# Patient Record
Sex: FEMALE | Race: WHITE | Employment: OTHER | URBAN - NONMETROPOLITAN AREA
[De-identification: names, ages, dates, MRNs, and addresses within clinical notes are randomized per-mention and may not be internally consistent; named-entity substitution may affect disease eponyms.]

---

## 2017-01-24 ENCOUNTER — ANTICOAGULATION THERAPY VISIT (OUTPATIENT)
Dept: ANTICOAGULATION | Facility: OTHER | Age: 57
End: 2017-01-24

## 2017-01-24 DIAGNOSIS — Z79.01 LONG-TERM (CURRENT) USE OF ANTICOAGULANTS: Primary | ICD-10-CM

## 2017-01-24 DIAGNOSIS — I10 BENIGN ESSENTIAL HYPERTENSION: ICD-10-CM

## 2017-01-24 DIAGNOSIS — E78.5 HYPERLIPIDEMIA WITH TARGET LDL LESS THAN 100: ICD-10-CM

## 2017-01-24 DIAGNOSIS — F25.1 SCHIZOAFFECTIVE DISORDER, DEPRESSIVE TYPE (H): Primary | ICD-10-CM

## 2017-01-24 DIAGNOSIS — Z79.899 ENCOUNTER FOR MONITORING STATIN THERAPY: ICD-10-CM

## 2017-01-24 DIAGNOSIS — Z79.899 NEED FOR PROPHYLACTIC CHEMOTHERAPY: ICD-10-CM

## 2017-01-24 DIAGNOSIS — Z51.81 ENCOUNTER FOR MONITORING STATIN THERAPY: ICD-10-CM

## 2017-01-24 DIAGNOSIS — Z79.01 LONG TERM (CURRENT) USE OF ANTICOAGULANTS: ICD-10-CM

## 2017-01-24 DIAGNOSIS — I26.99 PULMONARY EMBOLISM AND INFARCTION (H): ICD-10-CM

## 2017-01-24 DIAGNOSIS — I26.99 PULMONARY EMBOLISM WITH INFARCTION (H): ICD-10-CM

## 2017-01-24 LAB
ALBUMIN UR-MCNC: NEGATIVE MG/DL
APPEARANCE UR: CLEAR
BASOPHILS # BLD AUTO: 0 10E9/L (ref 0–0.2)
BASOPHILS NFR BLD AUTO: 0.2 %
BILIRUB UR QL STRIP: NEGATIVE
COLOR UR AUTO: YELLOW
DIFFERENTIAL METHOD BLD: ABNORMAL
EOSINOPHIL # BLD AUTO: 0.1 10E9/L (ref 0–0.7)
EOSINOPHIL NFR BLD AUTO: 1 %
ERYTHROCYTE [DISTWIDTH] IN BLOOD BY AUTOMATED COUNT: 15.8 % (ref 10–15)
GLUCOSE UR STRIP-MCNC: NEGATIVE MG/DL
HCT VFR BLD AUTO: 42.1 % (ref 35–47)
HGB BLD-MCNC: 13.2 G/DL (ref 11.7–15.7)
HGB UR QL STRIP: NEGATIVE
INR BLD: 1.9 (ref 0.86–1.14)
KETONES UR STRIP-MCNC: NEGATIVE MG/DL
LEUKOCYTE ESTERASE UR QL STRIP: NEGATIVE
LYMPHOCYTES # BLD AUTO: 1.3 10E9/L (ref 0.8–5.3)
LYMPHOCYTES NFR BLD AUTO: 13.5 %
MCH RBC QN AUTO: 28.1 PG (ref 26.5–33)
MCHC RBC AUTO-ENTMCNC: 31.4 G/DL (ref 31.5–36.5)
MCV RBC AUTO: 90 FL (ref 78–100)
MONOCYTES # BLD AUTO: 0.4 10E9/L (ref 0–1.3)
MONOCYTES NFR BLD AUTO: 4.1 %
NEUTROPHILS # BLD AUTO: 7.5 10E9/L (ref 1.6–8.3)
NEUTROPHILS NFR BLD AUTO: 81.2 %
NITRATE UR QL: NEGATIVE
PH UR STRIP: 8 PH (ref 5–7)
PLATELET # BLD AUTO: 169 10E9/L (ref 150–450)
RBC # BLD AUTO: 4.69 10E12/L (ref 3.8–5.2)
SP GR UR STRIP: 1.01 (ref 1–1.03)
URN SPEC COLLECT METH UR: ABNORMAL
UROBILINOGEN UR STRIP-ACNC: 0.2 EU/DL (ref 0.2–1)
WBC # BLD AUTO: 9.2 10E9/L (ref 4–11)

## 2017-01-24 PROCEDURE — 84443 ASSAY THYROID STIM HORMONE: CPT | Performed by: NURSE PRACTITIONER

## 2017-01-24 PROCEDURE — 36415 COLL VENOUS BLD VENIPUNCTURE: CPT | Performed by: NURSE PRACTITIONER

## 2017-01-24 PROCEDURE — 36416 COLLJ CAPILLARY BLOOD SPEC: CPT | Performed by: NURSE PRACTITIONER

## 2017-01-24 PROCEDURE — 40000788 ZZHCL STATISTIC ESTIMATED AVERAGE GLUCOSE: Performed by: NURSE PRACTITIONER

## 2017-01-24 PROCEDURE — 85025 COMPLETE CBC W/AUTO DIFF WBC: CPT | Performed by: NURSE PRACTITIONER

## 2017-01-24 PROCEDURE — 81003 URINALYSIS AUTO W/O SCOPE: CPT | Performed by: NURSE PRACTITIONER

## 2017-01-24 PROCEDURE — 80048 BASIC METABOLIC PNL TOTAL CA: CPT | Performed by: NURSE PRACTITIONER

## 2017-01-24 PROCEDURE — 85610 PROTHROMBIN TIME: CPT | Mod: QW | Performed by: NURSE PRACTITIONER

## 2017-01-24 PROCEDURE — 80061 LIPID PANEL: CPT | Performed by: NURSE PRACTITIONER

## 2017-01-24 PROCEDURE — 84439 ASSAY OF FREE THYROXINE: CPT | Performed by: NURSE PRACTITIONER

## 2017-01-24 PROCEDURE — 84480 ASSAY TRIIODOTHYRONINE (T3): CPT | Performed by: NURSE PRACTITIONER

## 2017-01-24 PROCEDURE — 83036 HEMOGLOBIN GLYCOSYLATED A1C: CPT | Performed by: NURSE PRACTITIONER

## 2017-01-24 PROCEDURE — 99000 SPECIMEN HANDLING OFFICE-LAB: CPT | Performed by: NURSE PRACTITIONER

## 2017-01-24 PROCEDURE — 80076 HEPATIC FUNCTION PANEL: CPT | Performed by: NURSE PRACTITIONER

## 2017-01-24 PROCEDURE — 80178 ASSAY OF LITHIUM: CPT | Performed by: NURSE PRACTITIONER

## 2017-01-24 NOTE — PROGRESS NOTES
ANTICOAGULATION FOLLOW-UP CLINIC VISIT    Patient Name:  Frances Corea  Date:  1/24/2017  Contact Type:  Telephone/ Frances Seo, pt's mother    SUBJECTIVE:     Patient Findings     Positives No Problem Findings           OBJECTIVE    INR POINT OF CARE   Date Value Ref Range Status   01/24/2017 1.9* 0.86 - 1.14 Final     Comment:     This test is intended for monitoring Coumadin therapy.  Results are not   accurate   in patients with prolonged INR due to factor deficiency.         ASSESSMENT / PLAN  No question data found.  Anticoagulation Summary as of 1/24/2017     INR goal 2.0-3.0   Selected INR 1.9! (1/24/2017)   Maintenance plan 9 mg (3 mg x 3) on Mon, Wed, Fri; 6 mg (3 mg x 2) all other days   Full instructions 1/24: 9 mg; Otherwise 9 mg on Mon, Wed, Fri; 6 mg all other days   Weekly total 51 mg   Plan last modified Emma Sood RN (7/19/2016)   Next INR check 2/21/2017   Priority INR   Target end date Indefinite    Indications   Long-term (current) use of anticoagulants [Z79.01] [Z79.01]  Pulmonary embolism with infarction (H) [I26.99] [I26.99]         Anticoagulation Episode Summary     INR check location     Preferred lab     Send INR reminders to  ANTICOAG POOL    Comments    Always contact Frances Osborne, pt's mother, who assists with AC therapy      Anticoagulation Care Providers     Provider Role Specialty Phone number    Ayah Rojas NP [facility] [phone]            See the Encounter Report to view Anticoagulation Flowsheet and Dosing Calendar (Go to Encounters tab in chart review, and find the Anticoagulation Therapy Visit)        Stefany Marquez RN

## 2017-01-25 LAB
ALBUMIN SERPL-MCNC: 3.7 G/DL (ref 3.4–5)
ALP SERPL-CCNC: 120 U/L (ref 40–150)
ALT SERPL W P-5'-P-CCNC: 31 U/L (ref 0–50)
ANION GAP SERPL CALCULATED.3IONS-SCNC: 9 MMOL/L (ref 3–14)
AST SERPL W P-5'-P-CCNC: 16 U/L (ref 0–45)
BILIRUB DIRECT SERPL-MCNC: 0.1 MG/DL (ref 0–0.2)
BILIRUB SERPL-MCNC: 0.7 MG/DL (ref 0.2–1.3)
BUN SERPL-MCNC: 13 MG/DL (ref 7–30)
CALCIUM SERPL-MCNC: 10.1 MG/DL (ref 8.5–10.1)
CHLORIDE SERPL-SCNC: 105 MMOL/L (ref 94–109)
CHOLEST SERPL-MCNC: 170 MG/DL
CO2 SERPL-SCNC: 27 MMOL/L (ref 20–32)
CREAT SERPL-MCNC: 0.61 MG/DL (ref 0.52–1.04)
EST. AVERAGE GLUCOSE BLD GHB EST-MCNC: 91 MG/DL
GFR SERPL CREATININE-BSD FRML MDRD: ABNORMAL ML/MIN/1.7M2
GLUCOSE SERPL-MCNC: 103 MG/DL (ref 70–99)
HBA1C MFR BLD: 4.8 % (ref 4.3–6)
HDLC SERPL-MCNC: 61 MG/DL
LDLC SERPL CALC-MCNC: 86 MG/DL
LITHIUM SERPL-SCNC: 1 MMOL/L (ref 0.6–1.2)
NONHDLC SERPL-MCNC: 109 MG/DL
POTASSIUM SERPL-SCNC: 4.1 MMOL/L (ref 3.4–5.3)
PROT SERPL-MCNC: 7.5 G/DL (ref 6.8–8.8)
SODIUM SERPL-SCNC: 141 MMOL/L (ref 133–144)
T3 SERPL-MCNC: 96 NG/DL (ref 60–181)
T4 FREE SERPL-MCNC: 0.79 NG/DL (ref 0.76–1.46)
TRIGL SERPL-MCNC: 115 MG/DL
TSH SERPL DL<=0.05 MIU/L-ACNC: 6.63 MU/L (ref 0.4–4)

## 2017-02-08 ENCOUNTER — TRANSFERRED RECORDS (OUTPATIENT)
Dept: HEALTH INFORMATION MANAGEMENT | Facility: HOSPITAL | Age: 57
End: 2017-02-08

## 2017-02-21 ENCOUNTER — ANTICOAGULATION THERAPY VISIT (OUTPATIENT)
Dept: ANTICOAGULATION | Facility: OTHER | Age: 57
End: 2017-02-21

## 2017-02-21 DIAGNOSIS — I26.99 PULMONARY EMBOLISM AND INFARCTION (H): ICD-10-CM

## 2017-02-21 DIAGNOSIS — Z79.01 LONG-TERM (CURRENT) USE OF ANTICOAGULANTS: ICD-10-CM

## 2017-02-21 DIAGNOSIS — Z79.01 LONG TERM (CURRENT) USE OF ANTICOAGULANTS: ICD-10-CM

## 2017-02-21 DIAGNOSIS — I26.99 PULMONARY EMBOLISM WITH INFARCTION (H): ICD-10-CM

## 2017-02-21 LAB — INR BLD: 2 (ref 0.86–1.14)

## 2017-02-21 PROCEDURE — 85610 PROTHROMBIN TIME: CPT | Mod: QW | Performed by: NURSE PRACTITIONER

## 2017-02-21 PROCEDURE — 36416 COLLJ CAPILLARY BLOOD SPEC: CPT | Performed by: NURSE PRACTITIONER

## 2017-02-21 NOTE — MR AVS SNAPSHOT
Frances Corea   2/21/2017   Anticoagulation Therapy Visit    Description:  56 year old female   Provider:  Ayah Rojas NP   Department:  H[facility] Anti Coagulation           INR as of 2/21/2017     Today's INR 2.0      Anticoagulation Summary as of 2/21/2017     INR goal 2.0-3.0   Today's INR 2.0   Full instructions 9 mg on Mon, Wed, Fri; 6 mg all other days   Next INR check 3/21/2017    Indications   Long-term (current) use of anticoagulants [Z79.01] [Z79.01]  Pulmonary embolism with infarction (H) [I26.99] [I26.99]         February 2017 Details    Sun Mon Tue Wed Thu Fri Sat        1               2               3               4                 5               6               7               8               9               10               11                 12               13               14               15               16               17               18                 19               20               21      6 mg   See details      22      9 mg         23      6 mg         24      9 mg         25      6 mg           26      6 mg         27      9 mg         28      6 mg              Date Details   02/21 This INR check               How to take your warfarin dose     To take:  6 mg Take 2 of the 3 mg tablets.    To take:  9 mg Take 3 of the 3 mg tablets.           March 2017 Details    Sun Mon Tue Wed Thu Fri Sat        1      9 mg         2      6 mg         3      9 mg         4      6 mg           5      6 mg         6      9 mg         7      6 mg         8      9 mg         9      6 mg         10      9 mg         11      6 mg           12      6 mg         13      9 mg         14      6 mg         15      9 mg         16      6 mg         17      9 mg         18      6 mg           19      6 mg         20      9 mg         21            22               23               24               25                 26               27               28               29               30               31                  Date Details   No additional details    Date of next INR:  3/21/2017         How to take your warfarin dose     To take:  6 mg Take 2 of the 3 mg tablets.    To take:  9 mg Take 3 of the 3 mg tablets.

## 2017-02-21 NOTE — PROGRESS NOTES
ANTICOAGULATION FOLLOW-UP CLINIC VISIT    Patient Name:  Frances Corea  Date:  2/21/2017  Contact Type:  Telephone/ message left on mother's phone re: INR result, warfarin dosing and INR recheck date. They are to notify us if patient has any bleeding/bruising, changes in diet/meds/activity or questions    SUBJECTIVE:     Patient Findings     Positives No Problem Findings           OBJECTIVE    INR Point of Care   Date Value Ref Range Status   02/21/2017 2.0 (H) 0.86 - 1.14 Final     Comment:     This test is intended for monitoring Coumadin therapy.  Results are not   accurate   in patients with prolonged INR due to factor deficiency.         ASSESSMENT / PLAN  INR assessment THER    Recheck INR In: 4 WEEKS    INR Location Clinic      Anticoagulation Summary as of 2/21/2017     INR goal 2.0-3.0   Today's INR 2.0   Maintenance plan 9 mg (3 mg x 3) on Mon, Wed, Fri; 6 mg (3 mg x 2) all other days   Full instructions 9 mg on Mon, Wed, Fri; 6 mg all other days   Weekly total 51 mg   No change documented Stefany Huynh RN   Plan last modified Emma Sood RN (7/19/2016)   Next INR check 3/21/2017   Priority INR   Target end date Indefinite    Indications   Long-term (current) use of anticoagulants [Z79.01] [Z79.01]  Pulmonary embolism with infarction (H) [I26.99] [I26.99]         Anticoagulation Episode Summary     INR check location     Preferred lab     Send INR reminders to [facility] POOL    Comments    Always contact Frances India, pt's mother, who assists with AC therapy      Anticoagulation Care Providers     Provider Role Specialty Phone number    Ayah Rojas NP [facility] [phone]            See the Encounter Report to view Anticoagulation Flowsheet and Dosing Calendar (Go to Encounters tab in chart review, and find the Anticoagulation Therapy Visit)        Stefany Huynh RN

## 2017-03-09 DIAGNOSIS — Z12.31 ENCOUNTER FOR SCREENING MAMMOGRAM FOR BREAST CANCER: Primary | ICD-10-CM

## 2017-03-14 DIAGNOSIS — M25.562 LEFT KNEE PAIN, UNSPECIFIED CHRONICITY: ICD-10-CM

## 2017-03-14 RX ORDER — TRAMADOL HYDROCHLORIDE 50 MG/1
TABLET ORAL
Qty: 60 TABLET | Refills: 0 | Status: SHIPPED | OUTPATIENT
Start: 2017-03-14 | End: 2017-04-12

## 2017-03-15 DIAGNOSIS — Z12.31 VISIT FOR SCREENING MAMMOGRAM: Primary | ICD-10-CM

## 2017-03-15 PROCEDURE — 77063 BREAST TOMOSYNTHESIS BI: CPT | Mod: TC

## 2017-03-15 PROCEDURE — G0202 SCR MAMMO BI INCL CAD: HCPCS | Mod: TC

## 2017-03-21 ENCOUNTER — OFFICE VISIT (OUTPATIENT)
Dept: FAMILY MEDICINE | Facility: OTHER | Age: 57
End: 2017-03-21
Attending: NURSE PRACTITIONER
Payer: MEDICARE

## 2017-03-21 ENCOUNTER — ANTICOAGULATION THERAPY VISIT (OUTPATIENT)
Dept: ANTICOAGULATION | Facility: OTHER | Age: 57
End: 2017-03-21

## 2017-03-21 VITALS
SYSTOLIC BLOOD PRESSURE: 134 MMHG | HEIGHT: 70 IN | HEART RATE: 84 BPM | WEIGHT: 293 LBS | BODY MASS INDEX: 41.95 KG/M2 | TEMPERATURE: 99.8 F | OXYGEN SATURATION: 14 % | DIASTOLIC BLOOD PRESSURE: 72 MMHG

## 2017-03-21 DIAGNOSIS — Z79.01 LONG TERM (CURRENT) USE OF ANTICOAGULANTS: ICD-10-CM

## 2017-03-21 DIAGNOSIS — I10 BENIGN ESSENTIAL HYPERTENSION: Primary | ICD-10-CM

## 2017-03-21 DIAGNOSIS — I26.99 PULMONARY EMBOLISM WITH INFARCTION (H): ICD-10-CM

## 2017-03-21 DIAGNOSIS — I26.99 PULMONARY EMBOLISM AND INFARCTION (H): ICD-10-CM

## 2017-03-21 DIAGNOSIS — Z79.01 LONG-TERM (CURRENT) USE OF ANTICOAGULANTS: ICD-10-CM

## 2017-03-21 LAB — INR BLD: 2.3 (ref 0.86–1.14)

## 2017-03-21 PROCEDURE — 85610 PROTHROMBIN TIME: CPT | Mod: QW | Performed by: NURSE PRACTITIONER

## 2017-03-21 PROCEDURE — 99212 OFFICE O/P EST SF 10 MIN: CPT

## 2017-03-21 PROCEDURE — 99213 OFFICE O/P EST LOW 20 MIN: CPT | Performed by: NURSE PRACTITIONER

## 2017-03-21 PROCEDURE — 36416 COLLJ CAPILLARY BLOOD SPEC: CPT | Performed by: NURSE PRACTITIONER

## 2017-03-21 RX ORDER — LISINOPRIL 5 MG/1
5 TABLET ORAL DAILY
Qty: 30 TABLET | Refills: 3 | Status: SHIPPED | OUTPATIENT
Start: 2017-03-21 | End: 2017-04-21

## 2017-03-21 ASSESSMENT — PAIN SCALES - GENERAL: PAINLEVEL: NO PAIN (0)

## 2017-03-21 NOTE — MR AVS SNAPSHOT
"              After Visit Summary   3/21/2017    Frances Corea    MRN: [id number]           Patient Information     Date Of Birth          1960        Visit Information        Provider Department      3/21/2017 2:45 PM Ayah Rojas NP [facility]        Today's Diagnoses     Benign essential hypertension    -  1      Care Instructions        1. Benign essential hypertension  - lisinopril (PRINIVIL/ZESTRIL) 5 MG tablet; Take 1 tablet (5 mg) by mouth daily  Dispense: 30 tablet; Refill: 3    FU visit at the end of April    Ayah Rojas -[facility]  [phone]              Follow-ups after your visit        Who to contact     If you have questions or need follow up information about today's clinic visit or your schedule please contact [facility] directly at [phone].  Normal or non-critical lab and imaging results will be communicated to you by MyChart, letter or phone within 4 business days after the clinic has received the results. If you do not hear from us within 7 days, please contact the clinic through MyChart or phone. If you have a critical or abnormal lab result, we will notify you by phone as soon as possible.  Submit refill requests through [facility] or call your pharmacy and they will forward the refill request to us. Please allow 3 business days for your refill to be completed.          Additional Information About Your Visit        MyChart Information     [facility] lets you send messages to your doctor, view your test results, renew your prescriptions, schedule appointments and more. To sign up, go to [email or web address] . Click on \"Log in\" on the left side of the screen, which will take you to the Welcome page. Then click on \"Sign up Now\" on the right side of the page.     You will be asked to enter the access code listed below, as well as some personal information. Please follow the directions to create your username and password.     Your access code is: " [email or web address]  Expires: 3/30/2017  3:14 PM     Your access code will  in 90 days. If you need help or a new code, please call your [facility] or [phone].        Care EveryWhere ID     This is your Care EveryWhere ID. This could be used by other organizations to access your [facility] records  [id number]        Your Vitals Were     Pulse Temperature Height Pulse Oximetry BMI (Body Mass Index)       84 99.8  F (37.7  C) (Tympanic) 5' 10\" (1.778 m) 14% 42.66 kg/m2        Blood Pressure from Last 3 Encounters:   17 134/72   16 116/76   10/15/16 168/81    Weight from Last 3 Encounters:   17 297 lb 4.8 oz (134.9 kg)   16 (!) 301 lb (136.5 kg)   10/15/16 294 lb 1.5 oz (133.4 kg)              Today, you had the following     No orders found for display         Today's Medication Changes          These changes are accurate as of: 3/21/17  3:32 PM.  If you have any questions, ask your nurse or doctor.               Start taking these medicines.        Dose/Directions    lisinopril 5 MG tablet   Commonly known as:  PRINIVIL/ZESTRIL   Used for:  Benign essential hypertension   Started by:  Ayah Rojas NP        Dose:  5 mg   Take 1 tablet (5 mg) by mouth daily   Quantity:  30 tablet   Refills:  3            Where to get your medicines      These medications were sent to [facility] [id number] [place] [address]  AT [facility] [address]   [address] [facility] [address]     Phone:  [phone]     lisinopril 5 MG tablet                Primary Care Provider Office Phone # Fax #    Ayah Rojas [phone]       [facility] [address] [address]        Thank you!     Thank you for choosing [facility]  for your care. Our goal is always to provide you with excellent care. Hearing back from our patients is one way we can continue to improve our services. Please take a few minutes to complete the written " survey that you may receive in the mail after your visit with us. Thank you!             Your Updated Medication List - Protect others around you: Learn how to safely use, store and throw away your medicines at www.disposemymeds.org.          This list is accurate as of: 3/21/17  3:32 PM.  Always use your most recent med list.                   Brand Name Dispense Instructions for use    albuterol (2.5 MG/3ML) 0.083% neb solution     75 mL    NEBULIZE 1 VIAL EVERY 6 HOURS AS NEEDED FOR SHORTNESS OF BREATH       ASPIRIN PO      Take 81 mg by mouth daily       CALCIUM + D PO      Take by mouth daily       clobetasol 0.05 % ointment    TEMOVATE    60 g    APPLY TOPICALLY TWICE DAILY ON THE BACK OF THE NECK AND HAIRLINE       CLOZAPINE PO    CLOZARIL     Take 75 mg by mouth At Bedtime       glucosamine-chondroitin 500-400 MG Caps per capsule      Take 1 capsule by mouth daily       latanoprost 0.005 % ophthalmic solution    XALATAN     Place 1 drop into both eyes At Bedtime       lisinopril 5 MG tablet    PRINIVIL/ZESTRIL    30 tablet    Take 1 tablet (5 mg) by mouth daily       * lithium 300 MG tablet      Take 300 mg by mouth 2 times daily       * lithium 300 MG tablet      Take 600 mg by mouth At Bedtime       LORAZEPAM PO      Take 0.5 mg by mouth every 4 hours as needed for anxiety       Lutein 20 MG Caps      Take by mouth daily       MAGNESIUM OXIDE PO      Take 400 mg by mouth daily       MELATONIN PO      Take 3 mg by mouth At Bedtime Pt takes at 8 PM       METHIMAZOLE PO      Take 5 mg by mouth daily 2 pills daily       MULTIVITAMIN & MINERAL PO      Take by mouth daily       order for DME     1 Device    Wheeled walker       PREVACID PO      Take 15 mg by mouth every morning (before breakfast)       PROBIOTIC DAILY PO      Take by mouth daily       sennosides 8.6 MG tablet    SENOKOT    120 tablet    Take 2 tablets by mouth daily as needed for constipation       simvastatin 20 MG tablet    ZOCOR    90 tablet     TAKE 1 TABLET BY MOUTH EVERY NIGHT AT BEDTIME       traMADol 50 MG tablet    ULTRAM    60 tablet    TAKE 1 TO 2 TABLETS BY MOUTH EVERY 6 HOURS AS NEEDED FOR PAIN. MAXIMUM OF 6 TABLETS PER DAY       triamcinolone 0.1 % ointment    KENALOG    80 g    APPLY TWICE DAILY TO BACK OF NECK AND HAIRLINE       TYLENOL PO      Take 500 mg by mouth       warfarin 3 MG tablet    COUMADIN    221 tablet    TAKE 3 TABLETS BY MOUTH ON MONDAY, WEDNESDAY, AND FRIDAY, AND 2 TABLETS BY MOUTH ON ALL OTHER DAYS OR AS DIRECTED PER WARFARIN CLINIC       * Notice:  This list has 2 medication(s) that are the same as other medications prescribed for you. Read the directions carefully, and ask your doctor or other care provider to review them with you.

## 2017-03-21 NOTE — MR AVS SNAPSHOT
Frances Corea   3/21/2017   Anticoagulation Therapy Visit    Description:  56 year old female   Provider:  Ayah Rojas NP   Department:  H[facility] Anti Coagulation           INR as of 3/21/2017     Today's INR 2.3      Anticoagulation Summary as of 3/21/2017     INR goal 2.0-3.0   Today's INR 2.3   Full instructions 9 mg on Mon, Wed, Fri; 6 mg all other days   Next INR check 5/2/2017    Indications   Long-term (current) use of anticoagulants [Z79.01] [Z79.01]  Pulmonary embolism with infarction (H) [I26.99] [I26.99]         March 2017 Details    Sun Mon Tue Wed Thu Fri Sat        1               2               3               4                 5               6               7               8               9               10               11                 12               13               14               15               16               17               18                 19               20               21      6 mg   See details      22      9 mg         23      6 mg         24      9 mg         25      6 mg           26      6 mg         27      9 mg         28      6 mg         29      9 mg         30      6 mg         31      9 mg           Date Details   03/21 This INR check               How to take your warfarin dose     To take:  6 mg Take 2 of the 3 mg tablets.    To take:  9 mg Take 3 of the 3 mg tablets.           April 2017 Details    Sun Mon Tue Wed Thu Fri Sat           1      6 mg           2      6 mg         3      9 mg         4      6 mg         5      9 mg         6      6 mg         7      9 mg         8      6 mg           9      6 mg         10      9 mg         11      6 mg         12      9 mg         13      6 mg         14      9 mg         15      6 mg           16      6 mg         17      9 mg         18      6 mg         19      9 mg         20      6 mg         21      9 mg         22      6 mg           23      6 mg         24      9 mg         25      6 mg         26      9  mg         27      6 mg         28      9 mg         29      6 mg           30      6 mg                Date Details   No additional details            How to take your warfarin dose     To take:  6 mg Take 2 of the 3 mg tablets.    To take:  9 mg Take 3 of the 3 mg tablets.           May 2017 Details    Sun Mon Tue Wed Thu Fri Sat      1      9 mg         2            3               4               5               6                 7               8               9               10               11               12               13                 14               15               16               17               18               19               20                 21               22               23               24               25               26               27                 28               29               30               31                   Date Details   No additional details    Date of next INR:  5/2/2017         How to take your warfarin dose     To take:  6 mg Take 2 of the 3 mg tablets.    To take:  9 mg Take 3 of the 3 mg tablets.

## 2017-03-21 NOTE — NURSING NOTE
"Chief Complaint   Patient presents with     Hypertension     follow up      *_* Health Care Directive *_*     on file        Initial /72 (BP Location: Left arm, Patient Position: Chair, Cuff Size: Adult Large)  Pulse 84  Temp 99.8  F (37.7  C) (Tympanic)  Ht 5' 10\" (1.778 m)  Wt 297 lb 4.8 oz (134.9 kg)  SpO2 (!) 14%  BMI 42.66 kg/m2 Estimated body mass index is 42.66 kg/(m^2) as calculated from the following:    Height as of this encounter: 5' 10\" (1.778 m).    Weight as of this encounter: 297 lb 4.8 oz (134.9 kg).  Medication Reconciliation: sam WELLS      "

## 2017-03-21 NOTE — PROGRESS NOTES
SUBJECTIVE:  Frances Corea is a 56 year old female   Chief Complaint   Patient presents with     Hypertension     follow up      *_* Health Care Directive *_*     on file        Active diagnoses this visit:  HTN    Follow up BP.  Pressure has fluctuated, so she wanted a recheck.  Home machine has high readings, and her mom has given her lisinopril prescribed for her Mom.  There are no high readings here.    Past Medical History   Diagnosis Date     Anxiety 3/17/2015     Benign essential hypertension 12/30/2016     Bipolar depression (H)      Chronic anticoagulation 2010     Chronic constipation 8/2/2016     Chronic deep vein thrombosis (DVT) of left lower extremity (H) 8/2/2016     COB (chronic obstructive bronchitis) (H)      Dyslipidemia      Esophageal reflux 3/17/2015     History of deep venous thrombosis 12/30/2016     History of psychiatric hospitalization      Multiple     Hyperlipidemia LDL goal < 100 3/17/2015     Insomnia 3/17/2015     Morbid obesity with BMI of 45.0-49.9, adult (H)      Obesity 3/17/2015     RADHA (obstructive sleep apnea)      Bipap 12-18/8-12, 4L O2     PE (pulmonary embolism) 2010     has been on chronic anticoagulation therapy.      Psoriasis 3/17/2015     RAD (reactive airway disease)      mild, intermittent     Schizoaffective disorder (H)      Sleep apnea      Vitamin D deficiency        Past Surgical History   Procedure Laterality Date     Hysterectomy       Hc ecp with cataract surgery       lt eye     Hc or ocular device intraop detached retina       Repair laceration hand  9/28/2013     Procedure: REPAIR LACERATION HAND;  repair left fifth finger laceration;  Surgeon: Miranda Xavier DO;  Location: [facility] OR     Gyn surgery       total hyst       Family History   Problem Relation Age of Onset     Ovarian Cancer Mother      CEREBROVASCULAR DISEASE Father        Social History   Substance Use Topics     Smoking status: Never Smoker     Smokeless tobacco: Never Used     Alcohol use  "No       Current Outpatient Prescriptions   Medication     traMADol (ULTRAM) 50 MG tablet     simvastatin (ZOCOR) 20 MG tablet     warfarin (COUMADIN) 3 MG tablet     clobetasol (TEMOVATE) 0.05 % ointment     triamcinolone (KENALOG) 0.1 % ointment     albuterol (2.5 MG/3ML) 0.083% nebulizer solution     METHIMAZOLE PO     ASPIRIN PO     LORAZEPAM PO     CLOZAPINE PO (CLOZARIL)     lithium 300 MG tablet     lithium 300 MG tablet     sennosides (SENOKOT) 8.6 MG tablet     order for DME     MAGNESIUM OXIDE PO     Multiple Vitamins-Minerals (MULTIVITAMIN & MINERAL PO)     Probiotic Product (PROBIOTIC DAILY PO)     Calcium Carbonate-Vitamin D (CALCIUM + D PO)     Lutein 20 MG CAPS     Acetaminophen (TYLENOL PO)     MELATONIN PO     latanoprost (XALATAN) 0.005 % ophthalmic solution     Lansoprazole (PREVACID PO)     glucosamine-chondroitin 500-400 MG CAPS     No current facility-administered medications for this visit.           Allergies   Allergen Reactions     Depakote      Increased lfts and ammonia     Zyprexa Other (See Comments)     Increased lft and increased ammonia.     Adhesive Tape Rash     Levaquin        REVIEW OF SYSTEMS  Skin: negative  Eyes: negative  Ears/Nose/Throat: negative  Respiratory: No shortness of breath, dyspnea on exertion, cough, or hemoptysis  Cardiovascular: negative  Gastrointestinal: negative  Genitourinary: negative  Musculoskeletal: negative  Neurologic: negative  Psychiatric: negative  Hematologic/Lymphatic/Immunologic: negative      OBJECTIVE:  /72 (BP Location: Left arm, Patient Position: Chair, Cuff Size: Adult Large)  Pulse 84  Temp 99.8  F (37.7  C) (Tympanic)  Ht 5' 10\" (1.778 m)  Wt 297 lb 4.8 oz (134.9 kg)  SpO2 (!) 14%  BMI 42.66 kg/m2  Constitutional: healthy, alert, no distress and cooperative  Head: Normocephalic. No masses, lesions, or tenderness  Neck: Neck supple. No adenopathy. Thyroid symmetric.  ENT: ENT exam unremarkable  Cardiovascular: PMI normal. No murmurs, " clicks gallops or rub  Respiratory: negative, Percussion normal. Good diaphragmatic excursion. Lungs clear  Gastrointestinal: Abdomen soft, non-tender. BS normal. No masses, organomegaly  Musculoskeletal: extremities normal- no gross deformities noted  Skin: no suspicious lesions or rashes  Neurologic: Gait normal. Sensation grossly WNL.  Psychiatric: mentation appears normal and affect normal/bright  Hematologic/Lymphatic/Immunologic: No adenopathy noted      Recent Mammogram was normal    Since pressures are consistently high t home, and given FH of HTN and CVA, I will start lisinopril 5mg daily.          1. Benign essential hypertension  - lisinopril (PRINIVIL/ZESTRIL) 5 MG tablet; Take 1 tablet (5 mg) by mouth daily  Dispense: 30 tablet; Refill: 3    FU visit at the end of April    Ayah JOY  [phone]

## 2017-03-21 NOTE — PROGRESS NOTES
ANTICOAGULATION FOLLOW-UP CLINIC VISIT    Patient Name:  Frances Corea  Date:  3/21/2017  Contact Type . Telephone/ message left on listed phone number to call,  which is her mother's phone, re: INR result, warfarin dosing and INR recheck date. She is to call if warfarin clinic if patient has had any bleeding/bruising, changes in diet/meds/activity or questions.     SUBJECTIVE:     Patient Findings     Positives No Problem Findings           OBJECTIVE    INR Point of Care   Date Value Ref Range Status   03/21/2017 2.3 (H) 0.86 - 1.14 Final     Comment:     This test is intended for monitoring Coumadin therapy.  Results are not   accurate   in patients with prolonged INR due to factor deficiency.         ASSESSMENT / PLAN  INR assessment THER    Recheck INR In: 6 WEEKS    INR Location Clinic      Anticoagulation Summary as of 3/21/2017     INR goal 2.0-3.0   Today's INR 2.3   Maintenance plan 9 mg (3 mg x 3) on Mon, Wed, Fri; 6 mg (3 mg x 2) all other days   Full instructions 9 mg on Mon, Wed, Fri; 6 mg all other days   Weekly total 51 mg   No change documented Stefany Huynh RN   Plan last modified Emma Sood RN (7/19/2016)   Next INR check 5/2/2017   Priority INR   Target end date Indefinite    Indications   Long-term (current) use of anticoagulants [Z79.01] [Z79.01]  Pulmonary embolism with infarction (H) [I26.99] [I26.99]         Anticoagulation Episode Summary     INR check location     Preferred lab     Send INR reminders to  ANTICOAG POOL    Comments    Always contact Frances India, pt's mother, who assists with AC therapy      Anticoagulation Care Providers     Provider Role Specialty Phone number    Ayah Rojas NP [facility] [phone]            See the Encounter Report to view Anticoagulation Flowsheet and Dosing Calendar (Go to Encounters tab in chart review, and find the Anticoagulation Therapy Visit)        Stefany Huynh, RN

## 2017-03-21 NOTE — PATIENT INSTRUCTIONS
1. Benign essential hypertension  - lisinopril (PRINIVIL/ZESTRIL) 5 MG tablet; Take 1 tablet (5 mg) by mouth daily  Dispense: 30 tablet; Refill: 3    FU visit at the end of April    Ayah ROCKKURT  [phone]

## 2017-04-12 DIAGNOSIS — M25.562 LEFT KNEE PAIN, UNSPECIFIED CHRONICITY: ICD-10-CM

## 2017-04-12 RX ORDER — TRAMADOL HYDROCHLORIDE 50 MG/1
TABLET ORAL
Qty: 60 TABLET | Refills: 0 | Status: SHIPPED | OUTPATIENT
Start: 2017-04-12 | End: 2017-05-11

## 2017-04-21 ENCOUNTER — OFFICE VISIT (OUTPATIENT)
Dept: FAMILY MEDICINE | Facility: OTHER | Age: 57
End: 2017-04-21
Attending: NURSE PRACTITIONER
Payer: COMMERCIAL

## 2017-04-21 VITALS
RESPIRATION RATE: 14 BRPM | SYSTOLIC BLOOD PRESSURE: 122 MMHG | HEIGHT: 70 IN | BODY MASS INDEX: 41.95 KG/M2 | WEIGHT: 293 LBS | TEMPERATURE: 99.3 F | DIASTOLIC BLOOD PRESSURE: 78 MMHG | HEART RATE: 96 BPM | OXYGEN SATURATION: 94 %

## 2017-04-21 DIAGNOSIS — I10 BENIGN ESSENTIAL HYPERTENSION: ICD-10-CM

## 2017-04-21 DIAGNOSIS — Z11.59 NEED FOR HEPATITIS C SCREENING TEST: Primary | ICD-10-CM

## 2017-04-21 DIAGNOSIS — R06.02 SOB (SHORTNESS OF BREATH): ICD-10-CM

## 2017-04-21 DIAGNOSIS — Z87.09 HISTORY OF RESPIRATORY FAILURE: ICD-10-CM

## 2017-04-21 DIAGNOSIS — G47.33 OSA (OBSTRUCTIVE SLEEP APNEA): ICD-10-CM

## 2017-04-21 PROCEDURE — 99214 OFFICE O/P EST MOD 30 MIN: CPT | Performed by: NURSE PRACTITIONER

## 2017-04-21 PROCEDURE — 86803 HEPATITIS C AB TEST: CPT | Performed by: NURSE PRACTITIONER

## 2017-04-21 PROCEDURE — 36415 COLL VENOUS BLD VENIPUNCTURE: CPT | Performed by: NURSE PRACTITIONER

## 2017-04-21 PROCEDURE — 99000 SPECIMEN HANDLING OFFICE-LAB: CPT | Performed by: NURSE PRACTITIONER

## 2017-04-21 PROCEDURE — 99212 OFFICE O/P EST SF 10 MIN: CPT

## 2017-04-21 RX ORDER — LISINOPRIL 10 MG/1
10 TABLET ORAL DAILY
Qty: 30 TABLET | Refills: 3 | Status: SHIPPED | OUTPATIENT
Start: 2017-04-21 | End: 2017-04-25

## 2017-04-21 RX ORDER — ALBUTEROL SULFATE 0.83 MG/ML
SOLUTION RESPIRATORY (INHALATION)
Qty: 1 BOX | Refills: 11 | Status: SHIPPED | OUTPATIENT
Start: 2017-04-21 | End: 2017-04-25

## 2017-04-21 NOTE — NURSING NOTE
"Chief Complaint   Patient presents with     Hypertension     Mother reports giving various doses of BP med depending on her daily BP.     Hepatitis C     Screen due       Initial /78 (BP Location: Left arm, Patient Position: Chair, Cuff Size: Adult Large)  Pulse 96  Temp 99.3  F (37.4  C) (Tympanic)  Resp 14  Ht 5' 10\" (1.778 m)  Wt 296 lb (134.3 kg)  SpO2 94%  BMI 42.47 kg/m2 Estimated body mass index is 42.47 kg/(m^2) as calculated from the following:    Height as of this encounter: 5' 10\" (1.778 m).    Weight as of this encounter: 296 lb (134.3 kg).  Medication Reconciliation: complete   Venus Lei      "

## 2017-04-21 NOTE — PROGRESS NOTES
SUBJECTIVE:  Frances Corea is a 56 year old female   Chief Complaint   Patient presents with     Hypertension     Mother reports giving various doses of BP med depending on her daily BP.     Hepatitis C     Screen due     Sleep Apnea     needs new bipap supplies     Shortness of Breath     needs replacement neb machine and albuterol nebs       Active diagnoses this visit:      Benign essential hypertension  Need for hepatitis C screening test  History of respiratory failure  SOB (shortness of breath)  RADHA (obstructive sleep apnea)       HTN  Present for years  Recent highs, Mom has been giving her 10mg of lisinopril with better control, and brings in numbers for review  No chest pain, no SOB, no edema  Low salt diet as able  Is more active      RADHA - history of PE - Mom states she is on Bipap  Bipap works well, no RADHA symptoms on Bipap    History of respiratory distress, and SOB - anxiety may contribute to this.  Uses nebs PRN, needs machine and tubing.    Due for Hep C testing    Past Medical History:   Diagnosis Date     Anxiety 3/17/2015     Benign essential hypertension 12/30/2016     Bipolar depression (H)      Chronic anticoagulation 2010     Chronic constipation 8/2/2016     Chronic deep vein thrombosis (DVT) of left lower extremity (H) 8/2/2016     COB (chronic obstructive bronchitis) (H)      Dyslipidemia      Esophageal reflux 3/17/2015     History of deep venous thrombosis 12/30/2016     History of psychiatric hospitalization     Multiple     Hyperlipidemia LDL goal < 100 3/17/2015     Insomnia 3/17/2015     Morbid obesity with BMI of 45.0-49.9, adult (H)      Obesity 3/17/2015     RADHA (obstructive sleep apnea)     Bipap 12-18/8-12, 4L O2     PE (pulmonary embolism) 2010    has been on chronic anticoagulation therapy.      Psoriasis 3/17/2015     RAD (reactive airway disease)     mild, intermittent     Schizoaffective disorder (H)      Sleep apnea      Vitamin D deficiency        Past Surgical History:    Procedure Laterality Date     GYN SURGERY      total hyst     HC ECP WITH CATARACT SURGERY      lt eye     HC OR OCULAR DEVICE INTRAOP DETACHED RETINA       HYSTERECTOMY       REPAIR LACERATION HAND  9/28/2013    Procedure: REPAIR LACERATION HAND;  repair left fifth finger laceration;  Surgeon: Miranda Xavier DO;  Location: [facility]       Family History   Problem Relation Age of Onset     Ovarian Cancer Mother      CEREBROVASCULAR DISEASE Father        Social History   Substance Use Topics     Smoking status: Never Smoker     Smokeless tobacco: Never Used     Alcohol use No       Current Outpatient Prescriptions   Medication     lisinopril (PRINIVIL/ZESTRIL) 10 MG tablet     order for DME     albuterol (2.5 MG/3ML) 0.083% neb solution     order for DME     traMADol (ULTRAM) 50 MG tablet     simvastatin (ZOCOR) 20 MG tablet     warfarin (COUMADIN) 3 MG tablet     clobetasol (TEMOVATE) 0.05 % ointment     triamcinolone (KENALOG) 0.1 % ointment     METHIMAZOLE PO     ASPIRIN PO     LORAZEPAM PO     CLOZAPINE PO (CLOZARIL)     lithium 300 MG tablet     lithium 300 MG tablet     sennosides (SENOKOT) 8.6 MG tablet     order for DME     MAGNESIUM OXIDE PO     Multiple Vitamins-Minerals (MULTIVITAMIN & MINERAL PO)     Probiotic Product (PROBIOTIC DAILY PO)     Calcium Carbonate-Vitamin D (CALCIUM + D PO)     Lutein 20 MG CAPS     Acetaminophen (TYLENOL PO)     MELATONIN PO     latanoprost (XALATAN) 0.005 % ophthalmic solution     Lansoprazole (PREVACID PO)     glucosamine-chondroitin 500-400 MG CAPS     [DISCONTINUED] lisinopril (PRINIVIL/ZESTRIL) 5 MG tablet     [DISCONTINUED] albuterol (2.5 MG/3ML) 0.083% nebulizer solution     No current facility-administered medications for this visit.           Allergies   Allergen Reactions     Depakote      Increased lfts and ammonia     Zyprexa Other (See Comments)     Increased lft and increased ammonia.     Adhesive Tape Rash     Levaquin        REVIEW OF SYSTEMS  Skin:  "negative  Eyes: negative  Ears/Nose/Throat: negative  Respiratory: Shortness of breath- at times, uses nebs.  RADHA, on Bipap  Cardiovascular: negative  Gastrointestinal: negative  Genitourinary: negative  Musculoskeletal: arthritis  Neurologic: negative  Psychiatric: extensive, managed by psychiatry  Hematologic/Lymphatic/Immunologic: negative      OBJECTIVE:  /78 (BP Location: Left arm, Patient Position: Chair, Cuff Size: Adult Large)  Pulse 96  Temp 99.3  F (37.4  C) (Tympanic)  Resp 14  Ht 5' 10\" (1.778 m)  Wt 296 lb (134.3 kg)  SpO2 94%  BMI 42.47 kg/m2     Constitutional: healthy, alert, no distress and cooperative  Head: Normocephalic. No masses, lesions, or tenderness  Neck: Neck supple. No adenopathy. Thyroid symmetric.  ENT: ENT exam unremarkable  Cardiovascular: PMI normal. No murmurs, clicks gallops or rub  Respiratory: negative, Percussion normal. Good diaphragmatic excursion. Lungs clear  Gastrointestinal: Abdomen soft, non-tender. BS normal. No masses, organomegaly  Musculoskeletal: extremities normal- no gross deformities noted  Skin: no suspicious lesions or rashes  Neurologic: Gait normal. Sensation grossly WNL.  Psychiatric: mentation appears normal and affect normal/bright  Hematologic/Lymphatic/Immunologic: No adenopathy noted        Visit time:   25 Minutes  Time spent with patient, including examination, face to face patient education - 15 mn  Visit Content: During our face to face time, patient education was provided regarding diagnosis, and treatment pan. Patient counseled regarding disease process  All diagnosis and treatment plan are reviewed with the patient, 50 % of face to face time is directed at patient education  Record review completed        1. Benign essential hypertension  - lisinopril (PRINIVIL/ZESTRIL) 10 MG tablet; Take 1 tablet (10 mg) by mouth daily  Dispense: 30 tablet; Refill: 3    2. Need for hepatitis C screening test  - Hepatitis C antibody    3. History of " respiratory failure  - order for DME; Equipment being ordered: neb machine  Dispense: 1 Device; Refill: 0  - albuterol (2.5 MG/3ML) 0.083% neb solution; NEBULIZE 1 VIAL EVERY 6 HOURS AS NEEDED FOR SHORTNESS OF BREATH  Dispense: 1 Box; Refill: 11    4. SOB (shortness of breath)  - albuterol (2.5 MG/3ML) 0.083% neb solution; NEBULIZE 1 VIAL EVERY 6 HOURS AS NEEDED FOR SHORTNESS OF BREATH  Dispense: 1 Box; Refill: 11    5. RADHA (obstructive sleep apnea)  - order for DME; Equipment being ordered: Bipap and supplies - states it has been 5 years  Dispense: 1 Device; Refill: 0    Please schedule a morning fasting visit for July, to address chronic disease management      Ayah JOY  [phone]

## 2017-04-21 NOTE — PATIENT INSTRUCTIONS
1. Benign essential hypertension  - lisinopril (PRINIVIL/ZESTRIL) 10 MG tablet; Take 1 tablet (10 mg) by mouth daily  Dispense: 30 tablet; Refill: 3    2. Need for hepatitis C screening test  - Hepatitis C antibody    3. History of respiratory failure  - order for DME; Equipment being ordered: neb machine  Dispense: 1 Device; Refill: 0  - albuterol (2.5 MG/3ML) 0.083% neb solution; NEBULIZE 1 VIAL EVERY 6 HOURS AS NEEDED FOR SHORTNESS OF BREATH  Dispense: 1 Box; Refill: 11    4. SOB (shortness of breath)  - albuterol (2.5 MG/3ML) 0.083% neb solution; NEBULIZE 1 VIAL EVERY 6 HOURS AS NEEDED FOR SHORTNESS OF BREATH  Dispense: 1 Box; Refill: 11    5. RADHA (obstructive sleep apnea)  - order for DME; Equipment being ordered: Bipap and supplies - states it has been 5 years  Dispense: 1 Device; Refill: 0    Please schedule a morning fasting visit for July, to address chronic disease management      Ayah JOY  [phone]

## 2017-04-21 NOTE — MR AVS SNAPSHOT
After Visit Summary   4/21/2017    Frances Corea    MRN: [id number]           Patient Information     Date Of Birth          1960        Visit Information        Provider Department      4/21/2017 1:45 PM Ayah Rojas NP [facility]        Today's Diagnoses     Need for hepatitis C screening test    -  1    Benign essential hypertension        History of respiratory failure        SOB (shortness of breath)        RADHA (obstructive sleep apnea)          Care Instructions        1. Benign essential hypertension  - lisinopril (PRINIVIL/ZESTRIL) 10 MG tablet; Take 1 tablet (10 mg) by mouth daily  Dispense: 30 tablet; Refill: 3    2. Need for hepatitis C screening test  - Hepatitis C antibody    3. History of respiratory failure  - order for DME; Equipment being ordered: neb machine  Dispense: 1 Device; Refill: 0  - albuterol (2.5 MG/3ML) 0.083% neb solution; NEBULIZE 1 VIAL EVERY 6 HOURS AS NEEDED FOR SHORTNESS OF BREATH  Dispense: 1 Box; Refill: 11    4. SOB (shortness of breath)  - albuterol (2.5 MG/3ML) 0.083% neb solution; NEBULIZE 1 VIAL EVERY 6 HOURS AS NEEDED FOR SHORTNESS OF BREATH  Dispense: 1 Box; Refill: 11    5. RADHA (obstructive sleep apnea)  - order for DME; Equipment being ordered: Bipap and supplies - states it has been 5 years  Dispense: 1 Device; Refill: 0    Please schedule a morning fasting visit for July, to address chronic disease management      Ayah [facility]  [phone]              Follow-ups after your visit        Who to contact     If you have questions or need follow up information about today's clinic visit or your schedule please contact [facility] directly at [phone].  Normal or non-critical lab and imaging results will be communicated to you by MyChart, letter or phone within 4 business days after the clinic has received the results. If you do not hear from us within 7 days, please contact the clinic through MyChart or phone. If  "you have a critical or abnormal lab result, we will notify you by phone as soon as possible.  Submit refill requests through [employer] or call your pharmacy and they will forward the refill request to us. Please allow 3 business days for your refill to be completed.          Additional Information About Your Visit        "[employer]hart Information     Insights lets you send messages to your doctor, view your test results, renew your prescriptions, schedule appointments and more. To sign up, go to [email or web address] . Click on \"Log in\" on the left side of the screen, which will take you to the Welcome page. Then click on \"Sign up Now\" on the right side of the page.     You will be asked to enter the access code listed below, as well as some personal information. Please follow the directions to create your username and password.     Your access code is: [email or web address]  Expires: 2017  2:34 PM     Your access code will  in 90 days. If you need help or a new code, please call your [place] clinic or [phone].        Care EveryWhere ID     This is your Care EveryWhere ID. This could be used by other organizations to access your [place] medical records  [id number]        Your Vitals Were     Pulse Temperature Respirations Height Pulse Oximetry BMI (Body Mass Index)    96 99.3  F (37.4  C) (Tympanic) 14 5' 10\" (1.778 m) 94% 42.47 kg/m2       Blood Pressure from Last 3 Encounters:   17 122/78   17 134/72   16 116/76    Weight from Last 3 Encounters:   17 296 lb (134.3 kg)   17 297 lb 4.8 oz (134.9 kg)   16 (!) 301 lb (136.5 kg)              We Performed the Following     Hepatitis C antibody          Today's Medication Changes          These changes are accurate as of: 17  2:37 PM.  If you have any questions, ask your nurse or doctor.               These medicines have changed or have updated prescriptions.        Dose/Directions    lisinopril 10 MG tablet   Commonly known " as:  PRINIVIL/ZESTRIL   This may have changed:    - medication strength  - how much to take   Used for:  Benign essential hypertension   Changed by:  Ayah Rojas NP        Dose:  10 mg   Take 1 tablet (10 mg) by mouth daily   Quantity:  30 tablet   Refills:  3       * order for DME   This may have changed:  Another medication with the same name was added. Make sure you understand how and when to take each.   Used for:  Altered gait   Changed by:  Ayah Rojas NP        Wheeled walker   Quantity:  1 Device   Refills:  0       * order for DME   This may have changed:  You were already taking a medication with the same name, and this prescription was added. Make sure you understand how and when to take each.   Used for:  History of respiratory failure   Changed by:  Ayah Rojas NP        Equipment being ordered: neb machine   Quantity:  1 Device   Refills:  0       * order for DME   This may have changed:  You were already taking a medication with the same name, and this prescription was added. Make sure you understand how and when to take each.   Used for:  RADHA (obstructive sleep apnea)   Changed by:  Ayah Rojas NP        Equipment being ordered: Bipap and supplies - states it has been 5 years   Quantity:  1 Device   Refills:  0       * Notice:  This list has 3 medication(s) that are the same as other medications prescribed for you. Read the directions carefully, and ask your doctor or other care provider to review them with you.         Where to get your medicines      These medications were sent to [facility] [address] [facility] [address]  [address], [place] [address]     Phone:  [phone]     albuterol (2.5 MG/3ML) 0.083% neb solution    lisinopril 10 MG tablet         Some of these will need a paper prescription and others can be bought over the counter.  Ask your nurse if you have questions.     Bring a paper prescription for each of these  medications     order for DME    order for DME                Primary Care Provider Office Phone # Fax #    Ayah Rojas [phone]       [address] [address]        Thank you!     Thank you for choosing [facility]  for your care. Our goal is always to provide you with excellent care. Hearing back from our patients is one way we can continue to improve our services. Please take a few minutes to complete the written survey that you may receive in the mail after your visit with us. Thank you!             Your Updated Medication List - Protect others around you: Learn how to safely use, store and throw away your medicines at www.disposemymeds.org.          This list is accurate as of: 4/21/17  2:37 PM.  Always use your most recent med list.                   Brand Name Dispense Instructions for use    albuterol (2.5 MG/3ML) 0.083% neb solution     1 Box    NEBULIZE 1 VIAL EVERY 6 HOURS AS NEEDED FOR SHORTNESS OF BREATH       ASPIRIN PO      Take 81 mg by mouth daily       CALCIUM + D PO      Take by mouth daily       clobetasol 0.05 % ointment    TEMOVATE    60 g    APPLY TOPICALLY TWICE DAILY ON THE BACK OF THE NECK AND HAIRLINE       CLOZAPINE PO    CLOZARIL     Take 75 mg by mouth At Bedtime       glucosamine-chondroitin 500-400 MG Caps per capsule      Take 1 capsule by mouth daily       latanoprost 0.005 % ophthalmic solution    XALATAN     Place 1 drop into both eyes At Bedtime       lisinopril 10 MG tablet    PRINIVIL/ZESTRIL    30 tablet    Take 1 tablet (10 mg) by mouth daily       * lithium 300 MG tablet      Take 300 mg by mouth 2 times daily       * lithium 300 MG tablet      Take 600 mg by mouth At Bedtime       LORAZEPAM PO      Take 0.5 mg by mouth every 4 hours as needed for anxiety       Lutein 20 MG Caps      Take by mouth daily       MAGNESIUM OXIDE PO      Take 400 mg by mouth daily       MELATONIN PO      Take 3 mg by mouth At Bedtime  Pt takes at 8 PM       METHIMAZOLE PO      Take 5 mg by mouth daily 2 pills daily       MULTIVITAMIN & MINERAL PO      Take by mouth daily       * order for DME     1 Device    Wheeled walker       * order for DME     1 Device    Equipment being ordered: neb machine       * order for DME     1 Device    Equipment being ordered: Bipap and supplies - states it has been 5 years       PREVACID PO      Take 15 mg by mouth every morning (before breakfast)       PROBIOTIC DAILY PO      Take by mouth daily       sennosides 8.6 MG tablet    SENOKOT    120 tablet    Take 2 tablets by mouth daily as needed for constipation       simvastatin 20 MG tablet    ZOCOR    90 tablet    TAKE 1 TABLET BY MOUTH EVERY NIGHT AT BEDTIME       traMADol 50 MG tablet    ULTRAM    60 tablet    TAKE 1 TO 2 TABLETS BY MOUTH EVERY 6 HOURS AS NEEDED FOR PAIN. MAXIMUM 6 TABLETS PER DAY       triamcinolone 0.1 % ointment    KENALOG    80 g    APPLY TWICE DAILY TO BACK OF NECK AND HAIRLINE       TYLENOL PO      Take 500 mg by mouth       warfarin 3 MG tablet    COUMADIN    221 tablet    TAKE 3 TABLETS BY MOUTH ON MONDAY, WEDNESDAY, AND FRIDAY, AND 2 TABLETS BY MOUTH ON ALL OTHER DAYS OR AS DIRECTED PER WARFARIN CLINIC       * Notice:  This list has 5 medication(s) that are the same as other medications prescribed for you. Read the directions carefully, and ask your doctor or other care provider to review them with you.

## 2017-04-25 ENCOUNTER — TELEPHONE (OUTPATIENT)
Dept: FAMILY MEDICINE | Facility: OTHER | Age: 57
End: 2017-04-25

## 2017-04-25 DIAGNOSIS — R06.02 SOB (SHORTNESS OF BREATH): ICD-10-CM

## 2017-04-25 DIAGNOSIS — Z87.09 HISTORY OF RESPIRATORY FAILURE: ICD-10-CM

## 2017-04-25 DIAGNOSIS — I10 BENIGN ESSENTIAL HYPERTENSION: ICD-10-CM

## 2017-04-25 LAB — HCV AB SERPL QL IA: NORMAL

## 2017-04-25 RX ORDER — LISINOPRIL 10 MG/1
10 TABLET ORAL DAILY
Qty: 90 TABLET | Refills: 1 | Status: SHIPPED | OUTPATIENT
Start: 2017-04-25 | End: 2017-08-08

## 2017-04-25 RX ORDER — ALBUTEROL SULFATE 0.83 MG/ML
SOLUTION RESPIRATORY (INHALATION)
Qty: 1125 ML | Refills: 1 | Status: SHIPPED | OUTPATIENT
Start: 2017-04-25 | End: 2017-12-26

## 2017-04-25 NOTE — TELEPHONE ENCOUNTER
Bi-pap isn't due to be replaced until Oct of this yr.  Also neb machine isn't due to be replaced until Dec of 2018.  Pt will hold scripts until due.  Will return meb machine to [facility].

## 2017-04-25 NOTE — TELEPHONE ENCOUNTER
9:35 AM    Reason for Call: Phone Call    Description: Frances (mom) called to speak with nurse. Stated she needs to speak with Janee. When they were at an appt on Friday they had a transaction with Janee and she needs to get something straightened out with her. Did not give further detail. Please call her back at [phone]    Was an appointment offered for this call? No    Preferred method for responding to this message: Telephone Call    If we cannot reach you directly, may we leave a detailed response at the number you provided? Yes    Can this message wait until your PCP/provider returns, if available today? Not applicable    Giulia Rios

## 2017-05-02 ENCOUNTER — ANTICOAGULATION THERAPY VISIT (OUTPATIENT)
Dept: ANTICOAGULATION | Facility: OTHER | Age: 57
End: 2017-05-02

## 2017-05-02 DIAGNOSIS — I26.99 PULMONARY EMBOLISM WITH INFARCTION (H): ICD-10-CM

## 2017-05-02 DIAGNOSIS — I26.99 PULMONARY EMBOLISM AND INFARCTION (H): ICD-10-CM

## 2017-05-02 DIAGNOSIS — Z79.01 LONG-TERM (CURRENT) USE OF ANTICOAGULANTS: ICD-10-CM

## 2017-05-02 DIAGNOSIS — Z79.01 LONG TERM (CURRENT) USE OF ANTICOAGULANTS: ICD-10-CM

## 2017-05-02 LAB — INR BLD: 2 (ref 0.86–1.14)

## 2017-05-02 PROCEDURE — 85610 PROTHROMBIN TIME: CPT | Mod: QW,ZL | Performed by: NURSE PRACTITIONER

## 2017-05-02 PROCEDURE — 36416 COLLJ CAPILLARY BLOOD SPEC: CPT | Mod: ZL | Performed by: NURSE PRACTITIONER

## 2017-05-02 NOTE — MR AVS SNAPSHOT
Frances Corea   5/2/2017   Anticoagulation Therapy Visit    Description:  56 year old female   Provider:  Ayah Rojas NP   Department:  [facility] Anti Coagulation           INR as of 5/2/2017     Today's INR 2.0      Anticoagulation Summary as of 5/2/2017     INR goal 2.0-3.0   Today's INR 2.0   Full instructions 9 mg on Mon, Wed, Fri; 6 mg all other days   Next INR check 6/13/2017    Indications   Long-term (current) use of anticoagulants [Z79.01] [Z79.01]  Pulmonary embolism with infarction (H) [I26.99] [I26.99]         May 2017 Details    Sun Mon Tue Wed Thu Fri Sat      1               2      6 mg   See details      3      9 mg         4      6 mg         5      9 mg         6      6 mg           7      6 mg         8      9 mg         9      6 mg         10      9 mg         11      6 mg         12      9 mg         13      6 mg           14      6 mg         15      9 mg         16      6 mg         17      9 mg         18      6 mg         19      9 mg         20      6 mg           21      6 mg         22      9 mg         23      6 mg         24      9 mg         25      6 mg         26      9 mg         27      6 mg           28      6 mg         29      9 mg         30      6 mg         31      9 mg             Date Details   05/02 This INR check               How to take your warfarin dose     To take:  6 mg Take 2 of the 3 mg tablets.    To take:  9 mg Take 3 of the 3 mg tablets.           June 2017 Details    Sun Mon Tue Wed Thu Fri Sat         1      6 mg         2      9 mg         3      6 mg           4      6 mg         5      9 mg         6      6 mg         7      9 mg         8      6 mg         9      9 mg         10      6 mg           11      6 mg         12      9 mg         13            14               15               16               17                 18               19               20               21               22               23               24                 25                26               27               28               29               30                 Date Details   No additional details    Date of next INR:  6/13/2017         How to take your warfarin dose     To take:  6 mg Take 2 of the 3 mg tablets.    To take:  9 mg Take 3 of the 3 mg tablets.

## 2017-05-02 NOTE — PROGRESS NOTES
ANTICOAGULATION FOLLOW-UP CLINIC VISIT    Patient Name:  Frances Corea  Date:  5/2/2017  Contact Type:  Telephone/ message left on mother's cell phone per instructions re: INR result, warfarin dosing and INR recheck date. They are to call warfarin clinic if questions.     SUBJECTIVE:     Patient Findings     Comments Patient to call and notify warfarin clinic if any bleeding/bruising, changes in diet/meds/activity or questions           OBJECTIVE    INR Point of Care   Date Value Ref [facility] Status   05/02/2017 2.0 (H) 0.86 - 1.14 Final     Comment:     This test is intended for monitoring Coumadin therapy.  Results are not   accurate   in patients with prolonged INR due to factor deficiency.         ASSESSMENT / PLAN  INR assessment THER    Recheck INR In: 6 WEEKS    INR Location Clinic      Anticoagulation Summary as of 5/2/2017     INR goal 2.0-3.0   Today's INR 2.0   Maintenance plan 9 mg (3 mg x 3) on Mon, Wed, Fri; 6 mg (3 mg x 2) all other days   Full instructions 9 mg on Mon, Wed, Fri; 6 mg all other days   Weekly total 51 mg   No change documented Stefany Huynh RN   Plan last modified Emma Sood RN (7/19/2016)   Next INR check 6/13/2017   Priority INR   Target end date Indefinite    Indications   Long-term (current) use of anticoagulants [Z79.01] [Z79.01]  Pulmonary embolism with infarction (H) [I26.99] [I26.99]         Anticoagulation Episode Summary     INR check location     Preferred lab     Send INR reminders to [facility] POOL    Comments    Always contact Frances India, pt's mother, who assists with AC therapy      Anticoagulation Care Providers     Provider Role Specialty Phone number    Ayah Rojas NP [facility] [phone]            See the Encounter Report to view Anticoagulation Flowsheet and Dosing Calendar (Go to Encounters tab in chart review, and find the Anticoagulation Therapy Visit)        Stefany Huynh RN

## 2017-05-11 DIAGNOSIS — M25.562 LEFT KNEE PAIN, UNSPECIFIED CHRONICITY: ICD-10-CM

## 2017-05-11 RX ORDER — TRAMADOL HYDROCHLORIDE 50 MG/1
TABLET ORAL
Qty: 60 TABLET | Refills: 0 | Status: SHIPPED | OUTPATIENT
Start: 2017-05-11 | End: 2017-06-07

## 2017-05-11 NOTE — TELEPHONE ENCOUNTER
tramadol      Last Written Prescription Date: 4/12/17  Last Fill Quantity: 60,  # refills: 0   Last Office Visit with G, P or [facility] prescribing provider: 4/21/17

## 2017-06-07 DIAGNOSIS — L20.9 ATOPIC DERMATITIS, UNSPECIFIED TYPE: ICD-10-CM

## 2017-06-07 DIAGNOSIS — M25.562 LEFT KNEE PAIN, UNSPECIFIED CHRONICITY: ICD-10-CM

## 2017-06-09 ENCOUNTER — TELEPHONE (OUTPATIENT)
Dept: FAMILY MEDICINE | Facility: OTHER | Age: 57
End: 2017-06-09

## 2017-06-09 RX ORDER — TRAMADOL HYDROCHLORIDE 50 MG/1
TABLET ORAL
Qty: 60 TABLET | Refills: 0 | Status: SHIPPED | OUTPATIENT
Start: 2017-06-09 | End: 2017-06-30

## 2017-06-09 RX ORDER — TRIAMCINOLONE ACETONIDE 1 MG/G
OINTMENT TOPICAL
Qty: 80 G | Refills: 1 | Status: SHIPPED | OUTPATIENT
Start: 2017-06-09 | End: 2018-12-24

## 2017-06-09 NOTE — TELEPHONE ENCOUNTER
Reason for call:  Medication    1. Medication Name? Tramadol  2. Is this request for a refill? Yes  3. What Pharmacy do you use? [facility]  4. Have you contacted your pharmacy? Yes    5. If yes, when? 06/06  (Please note that the turn-around-time for prescriptions is 72 business hours; I am sending your request at this time. SEND TO  [facility] Pool  )  Description: Patient's mom calling in regards to the Rx and stated that [facility] told her she is needing to  the hard copy for this medication and that it can't be done through the computer. I advised patient that these may take up to 3 business days to complete and she stated she called three days ago and was hoping to  the Rx today.   Was an appointment offered for this a call? No   Preferred method for responding to this messageTelephone Call  If we cannot reach you directly, may we leave a detailed response at the number you provided? Yes  Can this message wait until your PCP/Provider returns if not available today? Not applicable

## 2017-06-09 NOTE — TELEPHONE ENCOUNTER
Medication was signed today by Ayah Rojas.  Please notify patient that it got faxed to pharmacy. Thank you

## 2017-06-20 ENCOUNTER — ANTICOAGULATION THERAPY VISIT (OUTPATIENT)
Dept: ANTICOAGULATION | Facility: OTHER | Age: 57
End: 2017-06-20

## 2017-06-20 DIAGNOSIS — I26.99 PULMONARY EMBOLISM WITH INFARCTION (H): ICD-10-CM

## 2017-06-20 DIAGNOSIS — Z79.01 LONG TERM (CURRENT) USE OF ANTICOAGULANTS: ICD-10-CM

## 2017-06-20 DIAGNOSIS — Z79.01 LONG-TERM (CURRENT) USE OF ANTICOAGULANTS: ICD-10-CM

## 2017-06-20 DIAGNOSIS — I26.99 PULMONARY EMBOLISM AND INFARCTION (H): ICD-10-CM

## 2017-06-20 LAB — INR BLD: 1.5 (ref 0.86–1.14)

## 2017-06-20 PROCEDURE — 85610 PROTHROMBIN TIME: CPT | Mod: QW,ZL | Performed by: NURSE PRACTITIONER

## 2017-06-20 PROCEDURE — 36416 COLLJ CAPILLARY BLOOD SPEC: CPT | Mod: ZL | Performed by: NURSE PRACTITIONER

## 2017-06-20 NOTE — PROGRESS NOTES
ANTICOAGULATION FOLLOW-UP CLINIC VISIT    Patient Name:  Frances Corea  Date:  6/20/2017  Contact Type:  Telephone/ message left on mother's listed phone number    SUBJECTIVE:     Patient Findings     Comments Unknown if any new changes or increases in vit K intake. Message left on Frances's listed phone voicemail re: INR result, warfarin dosing and INR recheck date. She is to notify warfarin clinic if any bleeding/bruising, changes in diet/meds/activity or questions. .            OBJECTIVE    INR Point of Care   Date Value Ref Range Status   06/20/2017 1.5 (H) 0.86 - 1.14 Final     Comment:     This test is intended for monitoring Coumadin therapy.  Results are not   accurate   in patients with prolonged INR due to factor deficiency.         ASSESSMENT / PLAN  INR assessment SUB    Recheck INR In: 1 WEEK    INR Location Clinic      Anticoagulation Summary as of 6/20/2017     INR goal 2.0-3.0   Today's INR 1.5!   Maintenance plan 9 mg (3 mg x 3) on Mon, Wed, Fri; 6 mg (3 mg x 2) all other days   Full instructions 6/20: 9 mg; 6/21: 10.5 mg; Otherwise 9 mg on Mon, Wed, Fri; 6 mg all other days   Weekly total 51 mg   Plan last modified Emma Sood RN (7/19/2016)   Next INR check 6/27/2017   Priority INR   Target end date Indefinite    Indications   Long-term (current) use of anticoagulants [Z79.01] [Z79.01]  Pulmonary embolism with infarction (H) [I26.99] [I26.99]         Anticoagulation Episode Summary     INR check location     Preferred lab     Send INR reminders to [facility] ANTICOAG POOL    Comments    Always contact Frances Osborne, pt's mother, who assists with AC therapy      Anticoagulation Care Providers     Provider Role Specialty Phone number    Ayah Rojas NP [facility] [phone]            See the Encounter Report to view Anticoagulation Flowsheet and Dosing Calendar (Go to Encounters tab in chart review, and find the Anticoagulation Therapy Visit)        Stefany Huynh, RN

## 2017-06-20 NOTE — MR AVS SNAPSHOT
Frances Corea   6/20/2017   Anticoagulation Therapy Visit    Description:  56 year old female   Provider:  Ayah Rojas NP   Department:  [facility] Anti [facility]agulation           INR as of 6/20/2017     Today's INR 1.5!      Anticoagulation Summary as of 6/20/2017     INR goal 2.0-3.0   Today's INR 1.5!   Full instructions 6/20: 9 mg; 6/21: 10.5 mg; Otherwise 9 mg on Mon, Wed, Fri; 6 mg all other days   Next INR check 6/27/2017    Indications   Long-term (current) use of anticoagulants [Z79.01] [Z79.01]  Pulmonary embolism with infarction (H) [I26.99] [I26.99]         June 2017 Details    Sun Mon Tue Wed Thu Fri Sat         1               2               3                 4               5               6               7               8               9               10                 11               12               13               14               15               16               17                 18               19               20      9 mg   See details      21      10.5 mg         22      6 mg         23      9 mg         24      6 mg           25      6 mg         26      9 mg         27            28               29               30                 Date Details   06/20 This INR check       Date of next INR:  6/27/2017         How to take your warfarin dose     To take:  6 mg Take 2 of the 3 mg tablets.    To take:  9 mg Take 3 of the 3 mg tablets.    To take:  10.5 mg Take 3.5 of the 3 mg tablets.

## 2017-06-29 ENCOUNTER — ANTICOAGULATION THERAPY VISIT (OUTPATIENT)
Dept: ANTICOAGULATION | Facility: OTHER | Age: 57
End: 2017-06-29

## 2017-06-29 DIAGNOSIS — Z79.01 LONG TERM (CURRENT) USE OF ANTICOAGULANTS: ICD-10-CM

## 2017-06-29 DIAGNOSIS — Z79.01 LONG-TERM (CURRENT) USE OF ANTICOAGULANTS: ICD-10-CM

## 2017-06-29 DIAGNOSIS — I26.99 PULMONARY EMBOLISM WITH INFARCTION (H): ICD-10-CM

## 2017-06-29 DIAGNOSIS — I26.99 PULMONARY EMBOLISM AND INFARCTION (H): ICD-10-CM

## 2017-06-29 LAB — INR BLD: 1.7 (ref 0.86–1.14)

## 2017-06-29 PROCEDURE — 85610 PROTHROMBIN TIME: CPT | Mod: QW,ZL | Performed by: NURSE PRACTITIONER

## 2017-06-29 PROCEDURE — 36416 COLLJ CAPILLARY BLOOD SPEC: CPT | Mod: ZL | Performed by: NURSE PRACTITIONER

## 2017-06-29 NOTE — PROGRESS NOTES
ANTICOAGULATION FOLLOW-UP CLINIC VISIT    Patient Name:  Frances Corea  Date:  6/29/2017  Contact Type:  Telephone/ message left on Frances's phone re: INR result, warfarin dosing and INR recheck date. She is to call if daughter has had any bleeding/bruising, changes in diet/meds/activity or questions    SUBJECTIVE:     Patient Findings     Positives No Problem Findings           OBJECTIVE    INR Point of Care   Date Value Ref Range Status   06/29/2017 1.7 (H) 0.86 - 1.14 Final     Comment:     This test is intended for monitoring Coumadin therapy.  Results are not   accurate   in patients with prolonged INR due to factor deficiency.         ASSESSMENT / PLAN  INR assessment SUB    Recheck INR In: 2 WEEKS    INR Location Clinic      Anticoagulation Summary as of 6/29/2017     INR goal 2.0-3.0   Today's INR 1.7!   Maintenance plan 9 mg (3 mg x 3) on Mon, Wed, Fri; 6 mg (3 mg x 2) all other days   Full instructions 6/29: 12 mg; Otherwise 9 mg on Mon, Wed, Fri; 6 mg all other days   Weekly total 51 mg   Plan last modified Emma Sood RN (7/19/2016)   Next INR check 7/13/2017   Priority INR   Target end date Indefinite    Indications   Long-term (current) use of anticoagulants [Z79.01] [Z79.01]  Pulmonary embolism with infarction (H) [I26.99] [I26.99]         Anticoagulation Episode Summary     INR check location     Preferred lab     Send INR reminders to  ANTICOAG POOL    Comments    Always contact Frances Shepardtaleugenio, pt's mother, who assists with AC therapy      Anticoagulation Care Providers     Provider Role Specialty Phone number    Ayah Rojas NP [facility] [phone]            See the Encounter Report to view Anticoagulation Flowsheet and Dosing Calendar (Go to Encounters tab in chart review, and find the Anticoagulation Therapy Visit)        Stefany Huynh, RN

## 2017-06-29 NOTE — MR AVS SNAPSHOT
Frances Corea   6/29/2017   Anticoagulation Therapy Visit    Description:  56 year old female   Provider:  Ayah Rojas NP   Department:  [facility]agulation           INR as of 6/29/2017     Today's INR 1.7!      Anticoagulation Summary as of 6/29/2017     INR goal 2.0-3.0   Today's INR 1.7!   Full instructions 6/29: 12 mg; Otherwise 9 mg on Mon, Wed, Fri; 6 mg all other days   Next INR check 7/13/2017    Indications   Long-term (current) use of anticoagulants [Z79.01] [Z79.01]  Pulmonary embolism with infarction (H) [I26.99] [I26.99]         June 2017 Details    Sun Mon Tue Wed Thu Fri Sat         1               2               3                 4               5               6               7               8               9               10                 11               12               13               14               15               16               17                 18               19               20               21               22               23               24                 25               26               27               28               29      12 mg   See details      30      9 mg           Date Details   06/29 This INR check               How to take your warfarin dose     To take:  9 mg Take 3 of the 3 mg tablets.    To take:  12 mg Take 4 of the 3 mg tablets.           July 2017 Details    Sun Mon Tue Wed Thu Fri Sat           1      6 mg           2      6 mg         3      9 mg         4      6 mg         5      9 mg         6      6 mg         7      9 mg         8      6 mg           9      6 mg         10      9 mg         11      6 mg         12      9 mg         13            14               15                 16               17               18               19               20               21               22                 23               24               25               26               27               28               29                 30               31                      Date Details   No additional details    Date of next INR:  7/13/2017         How to take your warfarin dose     To take:  6 mg Take 2 of the 3 mg tablets.    To take:  9 mg Take 3 of the 3 mg tablets.

## 2017-06-30 DIAGNOSIS — M25.562 LEFT KNEE PAIN, UNSPECIFIED CHRONICITY: ICD-10-CM

## 2017-06-30 RX ORDER — TRAMADOL HYDROCHLORIDE 50 MG/1
TABLET ORAL
Qty: 60 TABLET | Refills: 0 | Status: SHIPPED | OUTPATIENT
Start: 2017-06-30 | End: 2017-07-05

## 2017-06-30 NOTE — TELEPHONE ENCOUNTER
[employer]tram      Last Written Prescription Date: 6/9/17  Last Fill Quantity: 60,  # refills: 0   Last Office Visit with [facility], [facility] or [facility] prescribing provider: 4/21/17

## 2017-07-05 DIAGNOSIS — M25.562 LEFT KNEE PAIN, UNSPECIFIED CHRONICITY: ICD-10-CM

## 2017-07-06 RX ORDER — TRAMADOL HYDROCHLORIDE 50 MG/1
TABLET ORAL
Qty: 60 TABLET | Refills: 0 | Status: SHIPPED | OUTPATIENT
Start: 2017-07-10 | End: 2017-08-08

## 2017-07-06 NOTE — TELEPHONE ENCOUNTER
[employer]tram  Last visit: 4.21.17  Last refill: 6.30.17 #60 - RX post dated for 7.10.17  PCP Ayah Rojas

## 2017-07-13 ENCOUNTER — ANTICOAGULATION THERAPY VISIT (OUTPATIENT)
Dept: ANTICOAGULATION | Facility: OTHER | Age: 57
End: 2017-07-13

## 2017-07-13 DIAGNOSIS — I26.99 PULMONARY EMBOLISM AND INFARCTION (H): ICD-10-CM

## 2017-07-13 DIAGNOSIS — I26.99 PULMONARY EMBOLISM WITH INFARCTION (H): ICD-10-CM

## 2017-07-13 DIAGNOSIS — Z79.01 LONG-TERM (CURRENT) USE OF ANTICOAGULANTS: ICD-10-CM

## 2017-07-13 DIAGNOSIS — Z79.01 LONG TERM (CURRENT) USE OF ANTICOAGULANTS: ICD-10-CM

## 2017-07-13 LAB — INR BLD: 1.8 (ref 0.86–1.14)

## 2017-07-13 PROCEDURE — 85610 PROTHROMBIN TIME: CPT | Mod: QW,ZL | Performed by: NURSE PRACTITIONER

## 2017-07-13 PROCEDURE — 36416 COLLJ CAPILLARY BLOOD SPEC: CPT | Mod: ZL | Performed by: NURSE PRACTITIONER

## 2017-07-13 NOTE — MR AVS SNAPSHOT
Frances Corea   7/13/2017   Anticoagulation Therapy Visit    Description:  56 year old female   Provider:  Ayha Rojas NP   Department:  [facility] Coagulation           INR as of 7/13/2017     Today's INR 1.8!      Anticoagulation Summary as of 7/13/2017     INR goal 2.0-3.0   Today's INR 1.8!   Full instructions 7/13: 12 mg; 7/14: 12 mg; Otherwise 9 mg on Mon, Wed, Fri; 6 mg all other days   Next INR check 7/27/2017    Indications   Long-term (current) use of anticoagulants [Z79.01] [Z79.01]  Pulmonary embolism with infarction (H) [I26.99] [I26.99]         July 2017 Details    Sun Mon Tue Wed Thu Fri Sat           1                 2               3               4               5               6               7               8                 9               10               11               12               13      12 mg   See details      14      12 mg         15      6 mg           16      6 mg         17      9 mg         18      6 mg         19      9 mg         20      6 mg         21      9 mg         22      6 mg           23      6 mg         24      9 mg         25      6 mg         26      9 mg         27            28               29                 30               31                     Date Details   07/13 This INR check       Date of next INR:  7/27/2017         How to take your warfarin dose     To take:  6 mg Take 2 of the 3 mg tablets.    To take:  9 mg Take 3 of the 3 mg tablets.    To take:  12 mg Take 4 of the 3 mg tablets.

## 2017-07-13 NOTE — PROGRESS NOTES
ANTICOAGULATION FOLLOW-UP CLINIC VISIT    Patient Name:  Frances Corea  Date:  7/13/2017  Contact Type:  Telephone/ message left on mother's phone per instruction re: INR result, warfarin dosing and INR recheck date. she is to call warfarin clinic if she has any bleeding/bruising, changes in diet/meds/activity or questions.     SUBJECTIVE:     Patient Findings     Positives No Problem Findings           OBJECTIVE    INR Point of Care   Date Value Ref Range Status   07/13/2017 1.8 (H) 0.86 - 1.14 Final     Comment:     This test is intended for monitoring Coumadin therapy.  Results are not   accurate   in patients with prolonged INR due to factor deficiency.         ASSESSMENT / PLAN  INR assessment SUB    Recheck INR In: 2 WEEKS    INR Location Clinic      Anticoagulation Summary as of 7/13/2017     INR goal 2.0-3.0   Today's INR 1.8!   Maintenance plan 9 mg (3 mg x 3) on Mon, Wed, Fri; 6 mg (3 mg x 2) all other days   Full instructions 7/13: 12 mg; 7/14: 12 mg; Otherwise 9 mg on Mon, Wed, Fri; 6 mg all other days   Weekly total 51 mg   Plan last modified Emma Sood RN (7/19/2016)   Next INR check 7/27/2017   Priority INR   Target end date Indefinite    Indications   Long-term (current) use of anticoagulants [Z79.01] [Z79.01]  Pulmonary embolism with infarction (H) [I26.99] [I26.99]         Anticoagulation Episode Summary     INR check location     Preferred lab     Send INR reminders to [facility] ANTICOAG POOL    Comments    Always contact Frances India, pt's mother, who assists with AC therapy      Anticoagulation Care Providers     Provider Role Specialty Phone number    Ayah Rojas NP [facility] [phone]            See the Encounter Report to view Anticoagulation Flowsheet and Dosing Calendar (Go to Encounters tab in chart review, and find the Anticoagulation Therapy Visit)        Stefany Huynh, RN

## 2017-07-27 ENCOUNTER — ANTICOAGULATION THERAPY VISIT (OUTPATIENT)
Dept: CARE COORDINATION | Facility: OTHER | Age: 57
End: 2017-07-27

## 2017-07-27 DIAGNOSIS — Z79.01 LONG TERM (CURRENT) USE OF ANTICOAGULANTS: ICD-10-CM

## 2017-07-27 DIAGNOSIS — Z79.01 LONG-TERM (CURRENT) USE OF ANTICOAGULANTS: ICD-10-CM

## 2017-07-27 DIAGNOSIS — I26.99 PULMONARY EMBOLISM WITH INFARCTION (H): ICD-10-CM

## 2017-07-27 DIAGNOSIS — I26.99 PULMONARY EMBOLISM AND INFARCTION (H): ICD-10-CM

## 2017-07-27 LAB — INR BLD: 2.2 (ref 0.86–1.14)

## 2017-07-27 PROCEDURE — 36416 COLLJ CAPILLARY BLOOD SPEC: CPT | Mod: ZL | Performed by: NURSE PRACTITIONER

## 2017-07-27 PROCEDURE — 85610 PROTHROMBIN TIME: CPT | Mod: QW,ZL | Performed by: NURSE PRACTITIONER

## 2017-07-27 NOTE — PROGRESS NOTES
ANTICOAGULATION FOLLOW-UP CLINIC VISIT    Patient Name:  Frances Corea  Date:  7/27/2017  Contact Type:  Telephone/ message left on mother's voicemail.    SUBJECTIVE:     Patient Findings     Comments Message left oh her mother's voicemail re: INR result, warfarin dosing and INR recheck date. They are to call warfarin clinic if any bleeding/bruising, changes in diet/meds/activity or questions.            OBJECTIVE    INR Point of Care   Date Value Ref Range Status   07/27/2017 2.2 (H) 0.86 - 1.14 Final     Comment:     This test is intended for monitoring Coumadin therapy.  Results are not   accurate   in patients with prolonged INR due to factor deficiency.         ASSESSMENT / PLAN  INR assessment THER    Recheck INR In: 4 WEEKS    INR Location Clinic      Anticoagulation Summary as of 7/27/2017     INR goal 2.0-3.0   Today's INR 2.2   Maintenance plan 9 mg (3 mg x 3) on Mon, Wed, Fri; 6 mg (3 mg x 2) all other days   Full instructions 9 mg on Mon, Wed, Fri; 6 mg all other days   Weekly total 51 mg   No change documented Stefany Huynh RN   Plan last modified Emma Sood RN (7/19/2016)   Next INR check 8/24/2017   Priority INR   Target end date Indefinite    Indications   Long-term (current) use of anticoagulants [Z79.01] [Z79.01]  Pulmonary embolism with infarction (H) [I26.99] [I26.99]         Anticoagulation Episode Summary     INR check location     Preferred lab     Send INR reminders to [facility] ANTICOAG POOL    Comments    Always contact Frances Osborne, pt's mother, who assists with AC therapy      Anticoagulation Care Providers     Provider Role Specialty Phone number    Ayah Rojas NP [facility] [phone]            See the Encounter Report to view Anticoagulation Flowsheet and Dosing Calendar (Go to Encounters tab in chart review, and find the Anticoagulation Therapy Visit)        Stefany Huynh RN

## 2017-07-27 NOTE — MR AVS SNAPSHOT
Frances Corea   7/27/2017   Anticoagulation Therapy Visit    Description:  56 year old female   Provider:  Ayah Rojas NP   Department:  [facility] Care Coordination           INR as of 7/27/2017     Today's INR 2.2      Anticoagulation Summary as of 7/27/2017     INR goal 2.0-3.0   Today's INR 2.2   Full instructions 9 mg on Mon, Wed, Fri; 6 mg all other days   Next INR check 8/24/2017    Indications   Long-term (current) use of anticoagulants [Z79.01] [Z79.01]  Pulmonary embolism with infarction (H) [I26.99] [I26.99]         July 2017 Details    Sun Mon Tue Wed Thu Fri Sat           1                 2               3               4               5               6               7               8                 9               10               11               12               13               14               15                 16               17               18               19               20               21               22                 23               24               25               26               27      6 mg   See details      28      9 mg         29      6 mg           30      6 mg         31      9 mg               Date Details   07/27 This INR check               How to take your warfarin dose     To take:  6 mg Take 2 of the 3 mg tablets.    To take:  9 mg Take 3 of the 3 mg tablets.           August 2017 Details    Sun Mon Tue Wed Thu Fri Sat       1      6 mg         2      9 mg         3      6 mg         4      9 mg         5      6 mg           6      6 mg         7      9 mg         8      6 mg         9      9 mg         10      6 mg         11      9 mg         12      6 mg           13      6 mg         14      9 mg         15      6 mg         16      9 mg         17      6 mg         18      9 mg         19      6 mg           20      6 mg         21      9 mg         22      6 mg         23      9 mg         24            25               26                 27               28                29 30               31                  Date Details   No additional details    Date of next INR:  8/24/2017         How to take your warfarin dose     To take:  6 mg Take 2 of the 3 mg tablets.    To take:  9 mg Take 3 of the 3 mg tablets.

## 2017-08-08 ENCOUNTER — OFFICE VISIT (OUTPATIENT)
Dept: FAMILY MEDICINE | Facility: OTHER | Age: 57
End: 2017-08-08
Attending: NURSE PRACTITIONER
Payer: COMMERCIAL

## 2017-08-08 VITALS
TEMPERATURE: 98.4 F | HEIGHT: 70 IN | OXYGEN SATURATION: 93 % | DIASTOLIC BLOOD PRESSURE: 74 MMHG | SYSTOLIC BLOOD PRESSURE: 114 MMHG | RESPIRATION RATE: 14 BRPM | HEART RATE: 90 BPM | WEIGHT: 293 LBS | BODY MASS INDEX: 41.95 KG/M2

## 2017-08-08 DIAGNOSIS — Z12.11 SPECIAL SCREENING FOR MALIGNANT NEOPLASMS, COLON: ICD-10-CM

## 2017-08-08 DIAGNOSIS — J44.9 CHRONIC OBSTRUCTIVE PULMONARY DISEASE, UNSPECIFIED COPD TYPE (H): Primary | ICD-10-CM

## 2017-08-08 DIAGNOSIS — Z79.01 CHRONIC ANTICOAGULATION: ICD-10-CM

## 2017-08-08 DIAGNOSIS — Z86.711 HISTORY OF PULMONARY EMBOLISM: ICD-10-CM

## 2017-08-08 DIAGNOSIS — E78.5 HYPERLIPIDEMIA WITH TARGET LDL LESS THAN 100: ICD-10-CM

## 2017-08-08 DIAGNOSIS — I10 BENIGN ESSENTIAL HYPERTENSION: ICD-10-CM

## 2017-08-08 DIAGNOSIS — M25.562 LEFT KNEE PAIN, UNSPECIFIED CHRONICITY: ICD-10-CM

## 2017-08-08 PROBLEM — Z53.09 ASPIRIN CONTRAINDICATED: Status: ACTIVE | Noted: 2017-08-08

## 2017-08-08 PROBLEM — E66.01 MORBID OBESITY (H): Status: ACTIVE | Noted: 2017-08-08

## 2017-08-08 PROBLEM — Z51.81 ENCOUNTER FOR MONITORING STATIN THERAPY: Status: ACTIVE | Noted: 2017-08-08

## 2017-08-08 PROBLEM — Z79.899 ENCOUNTER FOR MONITORING STATIN THERAPY: Status: ACTIVE | Noted: 2017-08-08

## 2017-08-08 PROCEDURE — 99212 OFFICE O/P EST SF 10 MIN: CPT

## 2017-08-08 PROCEDURE — 99214 OFFICE O/P EST MOD 30 MIN: CPT | Performed by: NURSE PRACTITIONER

## 2017-08-08 PROCEDURE — 80048 BASIC METABOLIC PNL TOTAL CA: CPT | Mod: ZL | Performed by: NURSE PRACTITIONER

## 2017-08-08 PROCEDURE — 36415 COLL VENOUS BLD VENIPUNCTURE: CPT | Mod: ZL | Performed by: NURSE PRACTITIONER

## 2017-08-08 RX ORDER — SIMVASTATIN 20 MG
20 TABLET ORAL AT BEDTIME
Qty: 90 TABLET | Refills: 3 | Status: SHIPPED | OUTPATIENT
Start: 2017-08-08 | End: 2018-04-24

## 2017-08-08 RX ORDER — WARFARIN SODIUM 3 MG/1
TABLET ORAL
Qty: 221 TABLET | Refills: 1 | Status: SHIPPED | OUTPATIENT
Start: 2017-08-08 | End: 2017-11-16

## 2017-08-08 RX ORDER — LISINOPRIL 10 MG/1
10 TABLET ORAL DAILY
Qty: 90 TABLET | Refills: 1 | Status: SHIPPED | OUTPATIENT
Start: 2017-08-08 | End: 2017-12-26

## 2017-08-08 RX ORDER — TRAMADOL HYDROCHLORIDE 50 MG/1
TABLET ORAL
Qty: 60 TABLET | Refills: 3 | Status: SHIPPED | OUTPATIENT
Start: 2017-08-08 | End: 2017-11-27

## 2017-08-08 ASSESSMENT — ANXIETY QUESTIONNAIRES
1. FEELING NERVOUS, ANXIOUS, OR ON EDGE: SEVERAL DAYS
5. BEING SO RESTLESS THAT IT IS HARD TO SIT STILL: SEVERAL DAYS
3. WORRYING TOO MUCH ABOUT DIFFERENT THINGS: SEVERAL DAYS
7. FEELING AFRAID AS IF SOMETHING AWFUL MIGHT HAPPEN: SEVERAL DAYS
GAD7 TOTAL SCORE: 7
2. NOT BEING ABLE TO STOP OR CONTROL WORRYING: SEVERAL DAYS
6. BECOMING EASILY ANNOYED OR IRRITABLE: SEVERAL DAYS

## 2017-08-08 ASSESSMENT — PATIENT HEALTH QUESTIONNAIRE - PHQ9
SUM OF ALL RESPONSES TO PHQ QUESTIONS 1-9: 8
5. POOR APPETITE OR OVEREATING: SEVERAL DAYS

## 2017-08-08 NOTE — MR AVS SNAPSHOT
After Visit Summary   8/8/2017    Frances Corea    MRN: [id number]           Patient Information     Date Of Birth          1960        Visit Information        Provider Department      8/8/2017 2:45 PM Ayah Rojas NP [facility]        Today's Diagnoses     Chronic obstructive pulmonary disease, unspecified COPD type (H)    -  1    Left knee pain, unspecified chronicity        History of pulmonary embolism        Benign essential hypertension        Chronic anticoagulation        Hyperlipidemia with target LDL less than 100        Special screening for malignant neoplasms, colon          Care Instructions        1. Chronic obstructive pulmonary disease, unspecified COPD type (H)  - Nebs and home Oxygen, Bipap  - PULSE OXIMETRY, SINGLE DETERMINATION; Future  - COPD ACTION PLAN - order for Health Maintenance    2. Left knee pain, unspecified chronicity  - traMADol (ULTRAM) 50 MG tablet; TAKE 1 TO 2 TABLETS BY MOUTH EVERY 6 HOURS AS NEEDED FOR PAIN. MAX OF 6 TABLETS PER DAY  Dispense: 60 tablet; Refill: 3    3. History of pulmonary embolism  - Coumadin as prescribed    4. Benign essential hypertension  - lisinopril (PRINIVIL/ZESTRIL) 10 MG tablet; Take 1 tablet (10 mg) by mouth daily  Dispense: 90 tablet; Refill: 1  - Basic metabolic panel  - ASPIRIN NOT PRESCRIBED (INTENTIONAL); Please choose reason not prescribed, below  Dispense: 0 each; Refill: 0    5. Chronic anticoagulation  - warfarin (COUMADIN) 3 MG tablet; TAKE 3 TABLETS BY MOUTH ON MONDAY, WEDNESDAY, AND FRIDAY, AND 2 TABLETS BY MOUTH ON ALL OTHER DAYS OR AS DIRECTED PER WARFARIN CLINIC  Dispense: 221 tablet; Refill: 1    6. Hyperlipidemia with target LDL less than 100  - Continue plan of care  - Lipid check - fasting - in December or January    7. Special screening for malignant neoplasms, colon  - Immunos occult blood    8.  Advanced Directives  - Addressed      FU visit in December, fasting    TSH with endocrinology as  scheduled      Ayah Rojas NP  [facility]                     My Depression Action Plan  Name: Frances Corea   Date of Birth 1960  Date: 8/8/2017    My doctor: Ayah Rojas   My clinic: [facility]  [address]  [place] [address]  [phone]          GREEN    ZONE   Good Control    What it looks like:     Things are going generally well. You have normal up s and down s. You may even feel depressed from time to time, but bad moods usually last less than a day.   What you need to do:  1. Continue to care for yourself (see self care plan)  2. Check your depression survival kit and update it as needed  3. Follow your physician s recommendations including any medication.  4. Do not stop taking medication unless you consult with your physician first.           YELLOW         ZONE Getting Worse    What it looks like:     Depression is starting to interfere with your life.     It may be hard to get out of bed; you may be starting to isolate yourself from others.    Symptoms of depression are starting to last most all day and this has happened for several days.     You may have suicidal thoughts but they are not constant.   What you need to do:     1. Call your care team, your response to treatment will improve if you keep your care team informed of your progress. Yellow periods are signs an adjustment may need to be made.     2. Continue your self-care, even if you have to fake it!    3. Talk to someone in your support network    4. Open up your depression survival kit           RED    ZONE Medical Alert - Get Help    What it looks like:     Depression is seriously interfering with your life.     You may experience these or other symptoms: You can t get out of bed most days, can t work or engage in other necessary activities, you have trouble taking care of basic hygiene, or basic responsibilities, thoughts of suicide or death that will not go away, self-injurious  behavior.     What you need to do:  1. Call your care team and request a same-day appointment. If they are not available (weekends or after hours) call your local crisis line, emergency room or 911.      Electronically signed by: Venus Lei, August 8, 2017    Depression Self Care Plan / Survival Kit    Self-Care for Depression  Here s the deal. Your body and mind are really not as separate as most people think.  What you do and think affects how you feel and how you feel influences what you do and think. This means if you do things that people who feel good do, it will help you feel better.  Sometimes this is all it takes.  There is also a place for medication and therapy depending on how severe your depression is, so be sure to consult with your medical provider and/ or Behavioral Health Consultant if your symptoms are worsening or not improving.     In order to better manage my stress, I will:    Exercise  Get some form of exercise, every day. This will help reduce pain and release endorphins, the  feel good  chemicals in your brain. This is almost as good as taking antidepressants!  This is not the same as joining a gym and then never going! (they count on that by the way ) It can be as simple as just going for a walk or doing some gardening, anything that will get you moving.      Hygiene   Maintain good hygiene (Get out of bed in the morning, Make your bed, Brush your teeth, Take a shower, and Get dressed like you were going to work, even if you are unemployed).  If your clothes don't fit try to get ones that do.    Diet  I will strive to eat foods that are good for me, drink plenty of water, and avoid excessive sugar, caffeine, alcohol, and other mood-altering substances.  Some foods that are helpful in depression are: complex carbohydrates, B vitamins, flaxseed, fish or fish oil, fresh fruits and vegetables.    Psychotherapy  I agree to participate in Individual Therapy (if recommended).    Medication  If  prescribed medications, I agree to take them.  Missing doses can result in serious side effects.  I understand that drinking alcohol, or other illicit drug use, may cause potential side effects.  I will not stop my medication abruptly without first discussing it with my provider.    Staying Connected With Others  I will stay in touch with my friends, family members, and my primary care provider/team.    Use your imagination  Be creative.  We all have a creative side; it doesn t matter if it s oil painting, sand castles, or mud pies! This will also kick up the endorphins.    Witness Beauty  (AKA stop and smell the roses) Take a look outside, even in mid-winter. Notice colors, textures. Watch the squirrels and birds.     Service to others  Be of service to others.  There is always someone else in need.  By helping others we can  get out of ourselves  and remember the really important things.  This also provides opportunities for practicing all the other parts of the program.    Humor  Laugh and be silly!  Adjust your TV habits for less news and crime-drama and more comedy.    Control your stress  Try breathing deep, massage therapy, biofeedback, and meditation. Find time to relax each day.     My support system    Clinic Contact:  Phone number:    Contact 1:  Phone number:    Contact 2:  Phone number:    Z[facility]rian/:  Phone number:    Therapist:  Phone number:    [facility]:    Phone number:    Other community support:  Phone number:      My COPD Action Plan   Name: Frances Corea    YOB: 1960   Date: 8/8/2017    My doctor: Ayah Rojas NP   My clinic: [address] [address]  [phone]  My Controller Medicine: Albuterol nebs and home oxygen at night, bipap    Dose: as directed     My Rescue Medicine: Albuterol nebs     My Flare Up Medicine: N/A   Dose: N/A  My COPD Severity: Mild = FeV1 > 80%     Use of Oxygen: 2 Liters  at night        GREEN ZONE       Doing well today      Usual level of activity and exercise    Usual amount of cough and mucus    No shortness of breath    Usual level of health (thinking clearly, sleeping well, feel like eating) Actions:      Take daily medicines    Use oxygen as prescribed    Follow regular exercise and diet plan    Avoid cigarette smoke and other irritants that harm the lungs           YELLOW ZONE          Having a bad day or flare up      Short of breath more than usual    A lot more sputum (mucus) than usual    Sputum looks yellow, green, tan, brown or bloody    More coughing or wheezing    Fever or chills    Less energy; trouble completing activities    Trouble thinking or focusing    Using quick relief inhaler or nebulizer more often    Poor sleep; symptoms wake me up    Do not feel like eating Actions:      Get plenty of rest    Take daily medicines      If you use oxygen, call you doctor to see if you should adjust your oxygen    Do breathing exercises or other things to help you relax    Let a loved one, friend or neighbor know you are feeling worse    Call your care team if you have 2 or more symptoms.  Start taking steroids or antibiotics if directed by your care team           RED ZONE       Need medical care now      Severe shortness of breath (feel you can't breathe)    Fever, chills    Not enough breath to do any activity    Trouble coughing up mucus, walking or talking    Blood in mucus    Frequent coughing   Rescue medicines are not working    Not able to sleep because of breathing    Feel confused or drowsy    Chest pain    Actions:      Call your health care team.  If you cannot reach your care team, call 911 or go to the emergency room.        Electronically signed by: Ayah Rojas, August 8, 2017  Annual Reminders:  Meet with Care Team, Flu Shot every Fall and Pneumonia Shot at least once  Pharmacy:    [facility] [address] [place] [address] [address] AT [facility] OF  [address]  [facility], [place] - [address]          Follow-ups after your visit        Future tests that were ordered for you today     Open Future Orders        Priority Expected Expires Ordered    PULSE OXIMETRY, SINGLE DETERMINATION Routine  2018            Who to contact     If you have questions or need follow up information about today's clinic visit or your schedule please contact [facility] directly at [phone].  Normal or non-critical lab and imaging results will be communicated to you by MyChart, letter or phone within 4 business days after the clinic has received the results. If you do not hear from us within 7 days, please contact the clinic through [facility] or phone. If you have a critical or abnormal lab result, we will notify you by phone as soon as possible.  Submit refill requests through [employer] or call your pharmacy and they will forward the refill request to us. Please allow 3 business days for your refill to be completed.          Additional Information About Your Visit        WeedWall Information     [employer]Wall lets you send messages to your doctor, view your test results, renew your prescriptions, schedule appointments and more. To sign up, go to [email or web address] . Click on \"Log in\" on the left side of the screen, which will take you to the Welcome page. Then click on \"Sign up Now\" on the right side of the page.     You will be asked to enter the access code listed below, as well as some personal information. Please follow the directions to create your username and password.     Your access code is: [email or web address]  Expires: 2017  3:26 PM     Your access code will  in 90 days. If you need help or a new code, please call your [facility] or [phone].        Care EveryWhere ID     This is your Care EveryWhere ID. This could be used by other organizations to access your [place] medical records  [id number]      " "  Your Vitals Were     Pulse Temperature Respirations Height Pulse Oximetry BMI (Body Mass Index)    90 98.4  F (36.9  C) (Tympanic) 14 5' 10\" (1.778 m) 93% 42.04 kg/m2       Blood Pressure from Last 3 Encounters:   08/08/17 114/74   04/21/17 122/78   03/21/17 134/72    Weight from Last 3 Encounters:   08/08/17 293 lb (132.9 kg)   04/21/17 296 lb (134.3 kg)   03/21/17 297 lb 4.8 oz (134.9 kg)              We Performed the Following     Basic metabolic panel     COPD ACTION PLAN - order for Health Maintenance     DEPRESSION ACTION PLAN (DAP)     Immunos occult blood          Today's Medication Changes          These changes are accurate as of: 8/8/17  3:27 PM.  If you have any questions, ask your nurse or doctor.               Start taking these medicines.        Dose/Directions    ASPIRIN NOT PRESCRIBED   Commonly known as:  INTENTIONAL   Used for:  Benign essential hypertension   Started by:  Ayah Rojas NP        Please choose reason not prescribed, below   Quantity:  0 each   Refills:  0         These medicines have changed or have updated prescriptions.        Dose/Directions    simvastatin 20 MG tablet   Commonly known as:  ZOCOR   This may have changed:  See the new instructions.   Used for:  History of pulmonary embolism   Changed by:  Ayah Rojas NP        Dose:  20 mg   Take 1 tablet (20 mg) by mouth At Bedtime   Quantity:  90 tablet   Refills:  3       traMADol 50 MG tablet   Commonly known as:  ULTRAM   This may have changed:  See the new instructions.   Used for:  Left knee pain, unspecified chronicity   Changed by:  Ayah Rojas NP        TAKE 1 TO 2 TABLETS BY MOUTH EVERY 6 HOURS AS NEEDED FOR PAIN. MAX OF 6 TABLETS PER DAY   Quantity:  60 tablet   Refills:  3       warfarin 3 MG tablet   Commonly known as:  COUMADIN   This may have changed:  See the new instructions.   Used for:  Chronic anticoagulation   Changed by:  Ayah Rojas NP        TAKE 3 TABLETS BY MOUTH ON MONDAY, WEDNESDAY, AND FRIDAY, " AND 2 TABLETS BY MOUTH ON ALL OTHER DAYS OR AS DIRECTED PER WARFARIN CLINIC   Quantity:  221 tablet   Refills:  1            Where to get your medicines      These medications were sent to [facility] [address] [address]  AT [facility] [address] [place] [address]     Phone:  [phone]     lisinopril 10 MG tablet    simvastatin 20 MG tablet    warfarin 3 MG tablet         Some of these will need a paper prescription and others can be bought over the counter.  Ask your nurse if you have questions.     Bring a paper prescription for each of these medications     traMADol 50 MG tablet       You don't need a prescription for these medications     ASPIRIN NOT PRESCRIBED                Primary Care Provider Office Phone # Fax #    Yaah JACK Rojas [phone]       [address] [address]        Equal Access to Services     BRINA BRIONES : Hadii aad ku hadasho Soomaali, waaxda luqadaha, qaybta kaalmada adeegyada, radha lawson . So [facility] [phone].    ATENCIÓN: Si habla español, tiene a salter disposición servicios gratuitos de asistencia lingüística. Demarcus al [phone].    We comply with applicable federal civil rights laws and [place] laws. We do not discriminate on the basis of race, color, national origin, age, disability sex, sexual orientation or gender identity.            Thank you!     Thank you for choosing [facility]  for your care. Our goal is always to provide you with excellent care. Hearing back from our patients is one way we can continue to improve our services. Please take a few minutes to complete the written survey that you may receive in the mail after your visit with us. Thank you!             Your Updated Medication List - Protect others around you: Learn how to safely use, store and throw away your medicines at www.disposemymeds.org.          This list  is accurate as of: 8/8/17  3:27 PM.  Always use your most recent med list.                   Brand Name Dispense Instructions for use Diagnosis    albuterol (2.5 MG/3ML) 0.083% neb solution     1125 mL    NEBULIZE 1 VIAL EVERY 6 HOURS AS NEEDED FOR SHORTNESS OF BREATH    SOB (shortness of breath), History of respiratory failure       ASPIRIN NOT PRESCRIBED    INTENTIONAL    0 each    Please choose reason not prescribed, below    Benign essential hypertension       ASPIRIN PO      Take 81 mg by mouth daily        CALCIUM + D PO      Take by mouth daily        clobetasol 0.05 % ointment    TEMOVATE    60 g    APPLY TOPICALLY TWICE DAILY ON THE BACK OF THE NECK AND HAIRLINE    Atopic dermatitis, unspecified type       CLOZAPINE PO    CLOZARIL     Take 75 mg by mouth At Bedtime        glucosamine-chondroitin 500-400 MG Caps per capsule      Take 1 capsule by mouth daily        latanoprost 0.005 % ophthalmic solution    XALATAN     Place 1 drop into both eyes At Bedtime        lisinopril 10 MG tablet    PRINIVIL/ZESTRIL    90 tablet    Take 1 tablet (10 mg) by mouth daily    Benign essential hypertension       * lithium 300 MG tablet      Take 300 mg by mouth 2 times daily        * lithium 300 MG tablet      Take 600 mg by mouth At Bedtime        LORAZEPAM PO      Take 0.5 mg by mouth every 4 hours as needed for anxiety        Lutein 20 MG Caps      Take by mouth daily        MAGNESIUM OXIDE PO      Take 400 mg by mouth daily        MELATONIN PO      Take 3 mg by mouth At Bedtime Pt takes at 8 PM        METHIMAZOLE PO      Take 5 mg by mouth daily 2 pills daily        MULTIVITAMIN & MINERAL PO      Take by mouth daily        * order for DME     1 Device    Wheeled walker    Altered gait       * order for DME     1 Device    Equipment being ordered: neb machine    History of respiratory failure       * order for DME     1 Device    Equipment being ordered: Bipap and supplies - states it has been 5 years    RADHA  (obstructive sleep apnea)       PREVACID PO      Take 15 mg by mouth every morning (before breakfast)        PROBIOTIC DAILY PO      Take by mouth daily        sennosides 8.6 MG tablet    SENOKOT    120 tablet    Take 2 tablets by mouth daily as needed for constipation    Constipation, unspecified constipation type       simvastatin 20 MG tablet    ZOCOR    90 tablet    Take 1 tablet (20 mg) by mouth At Bedtime    History of pulmonary embolism       traMADol 50 MG tablet    ULTRAM    60 tablet    TAKE 1 TO 2 TABLETS BY MOUTH EVERY 6 HOURS AS NEEDED FOR PAIN. MAX OF 6 TABLETS PER DAY    Left knee pain, unspecified chronicity       triamcinolone 0.1 % ointment    KENALOG    80 g    APPLY TOPICALLY TWICE DAILY TO BACK OF NECK AND HAIRLINE    Atopic dermatitis, unspecified type       TYLENOL PO      Take 500 mg by mouth        warfarin 3 MG tablet    COUMADIN    221 tablet    TAKE 3 TABLETS BY MOUTH ON MONDAY, WEDNESDAY, AND FRIDAY, AND 2 TABLETS BY MOUTH ON ALL OTHER DAYS OR AS DIRECTED PER WARFARIN CLINIC    Chronic anticoagulation       * Notice:  This list has 5 medication(s) that are the same as other medications prescribed for you. Read the directions carefully, and ask your doctor or other care provider to review them with you.

## 2017-08-08 NOTE — PROGRESS NOTES
SUBJECTIVE:                                                    Frances Corea is a 57 year old female who presents to clinic today for the following health issues:      Hyperlipidemia Follow-Up      Rate your low fat/cholesterol diet?: good    Taking statin?  Yes, no muscle aches from statin    Other lipid medications/supplements?:  none    Hypertension Follow-up      Outpatient blood pressures are not being checked.    Low Salt Diet: not monitoring salt    Depression and Anxiety Follow-Up  Bipolar disorder      Status since last visit: stable, manages at [facility]    Other associated symptoms:None    Complicating factors:     Significant life event: No     Current substance abuse: None    PHQ-9 SCORE 1/5/2016 12/30/2016 8/8/2017   Total Score 6 0 8     CHAVEZ-7 SCORE 12/30/2016 8/8/2017   Total Score 0 7       PHQ-9  English  PHQ-9   Any Language  GAD7      COPD Follow-Up    Symptoms are currently: stable    Current fatigue or dyspnea with ambulation: none    Shortness of breath: stable    Increased or change in Cough/Sputum: No    Fever(s): No    Baseline ambulation without stopping to rest - 1 flight of stairs    Any ER/UC or hospital admissions since your last visit? No     History   Smoking Status     Never Smoker   Smokeless Tobacco     Never Used     No results found for: FEV1, ASV4YFF  Chronic Pain Follow-Up       Type / Location of Pain: Knees   Analgesia/pain control:       Recent changes:  same      Overall control: Tolerable with discomfort  Activity level/function:      Daily activities:  Able to do moderate activities    Work:  not applicable  Adverse effects:  No  Adherance    Taking medication as directed?  Yes    Participating in other treatments: yes  Risk Factors:    Sleep:  Good    Mood/anxiety:  controlled    Recent family or social stressors:  none noted    Other aggravating factors: none       PHQ-9 SCORE 1/5/2016 12/30/2016 8/8/2017   Total Score 6 0 8     CHAVEZ-7 SCORE 12/30/2016 8/8/2017   Total  Score 0 7     Encounter-Level CSA:     There are no encounter-level csa.            Problems taking medications regularly: No    Medication side effects: none       Diet: regular (no restrictions)      History of PE - on chronic coumadin, managed by coumadin clinic      Mammo UTD:    BILATERAL SCREENING MAMMOGRAM WITH TOMOSYNTHESIS     FINDINGS:  Scattered fibroglandular elements are noted.  Comparison is  made with previous examination in 2015.  There are no new dominant  masses or malignant calcifications noted.  Comparison with previous  examination reveals no significant interval change.     There are scattered areas of fibroglandular density.     IMPRESSION:  NEGATIVE MAMMOGRAM.  ANNUAL FOLLOW UP IS RECOMMENDED.     BI-RADS CATEGORY:  1 - Negative.     The examination was reviewed with R2 CAD.  Exam Date: Mar 15, 2017 03:03:00 PM  Author: LAURIE KNAPP  This report is final and null         Problem list and histories reviewed & adjusted, as indicated.  Additional history: as documented    Patient Active Problem List   Diagnosis     Schizophrenia, acute (H)     RADHA on CPAP     Chronic anticoagulation     Hyperlipidemia with target LDL less than 100     Gastroesophageal reflux disease without esophagitis     Anxiety     Primary insomnia     Psoriasis     Obesity     Bipolar 1 disorder (H)     ACP (advance care planning)     Long-term (current) use of anticoagulants [Z79.01]     Chronic deep vein thrombosis (DVT) of left lower extremity (H)     Chronic constipation     Benign essential hypertension     History of deep venous thrombosis     Morbid obesity (H)     Encounter for monitoring statin therapy     Aspirin contraindicated     Past Surgical History:   Procedure Laterality Date     GYN SURGERY      total hyst     HC ECP WITH CATARACT SURGERY      lt eye     HC OR OCULAR DEVICE INTRAOP DETACHED RETINA       HYSTERECTOMY       REPAIR LACERATION HAND  9/28/2013    Procedure: REPAIR LACERATION HAND;  repair  left fifth finger laceration;  Surgeon: Miranda Xavier DO;  Location: [facility]       Social History   Substance Use Topics     Smoking status: Never Smoker     Smokeless tobacco: Never Used     Alcohol use No     Family History   Problem Relation Age of Onset     Ovarian Cancer Mother      CEREBROVASCULAR DISEASE Father          Current Outpatient Prescriptions   Medication Sig Dispense Refill     traMADol (ULTRAM) 50 MG tablet TAKE 1 TO 2 TABLETS BY MOUTH EVERY 6 HOURS AS NEEDED FOR PAIN. MAX OF 6 TABLETS PER DAY 60 tablet 3     simvastatin (ZOCOR) 20 MG tablet Take 1 tablet (20 mg) by mouth At Bedtime 90 tablet 3     lisinopril (PRINIVIL/ZESTRIL) 10 MG tablet Take 1 tablet (10 mg) by mouth daily 90 tablet 1     warfarin (COUMADIN) 3 MG tablet TAKE 3 TABLETS BY MOUTH ON MONDAY, WEDNESDAY, AND FRIDAY, AND 2 TABLETS BY MOUTH ON ALL OTHER DAYS OR AS DIRECTED PER WARFARIN CLINIC 221 tablet 1     ASPIRIN NOT PRESCRIBED (INTENTIONAL) Please choose reason not prescribed, below 0 each 0     triamcinolone (KENALOG) 0.1 % ointment APPLY TOPICALLY TWICE DAILY TO BACK OF NECK AND HAIRLINE 80 g 1     albuterol (2.5 MG/3ML) 0.083% neb solution NEBULIZE 1 VIAL EVERY 6 HOURS AS NEEDED FOR SHORTNESS OF BREATH 1125 mL 1     order for DME Equipment being ordered: neb machine 1 Device 0     order for DME Equipment being ordered: Bipap and supplies - states it has been 5 years 1 Device 0     clobetasol (TEMOVATE) 0.05 % ointment APPLY TOPICALLY TWICE DAILY ON THE BACK OF THE NECK AND HAIRLINE 60 g 0     METHIMAZOLE PO Take 5 mg by mouth daily 2 pills daily        ASPIRIN PO Take 81 mg by mouth daily       LORAZEPAM PO Take 0.5 mg by mouth every 4 hours as needed for anxiety       CLOZAPINE PO (CLOZARIL) Take 75 mg by mouth At Bedtime        lithium 300 MG tablet Take 300 mg by mouth 2 times daily       lithium 300 MG tablet Take 600 mg by mouth At Bedtime       sennosides (SENOKOT) 8.6 MG tablet Take 2 tablets by mouth daily as needed  for constipation 120 tablet 1     order for DME Wheeled walker 1 Device 0     MAGNESIUM OXIDE PO Take 400 mg by mouth daily        Multiple Vitamins-Minerals (MULTIVITAMIN & MINERAL PO) Take by mouth daily        Probiotic Product (PROBIOTIC DAILY PO) Take by mouth daily        Calcium Carbonate-Vitamin D (CALCIUM + D PO) Take by mouth daily        Lutein 20 MG CAPS Take by mouth daily        Acetaminophen (TYLENOL PO) Take 500 mg by mouth       MELATONIN PO Take 3 mg by mouth At Bedtime Pt takes at 8 PM       latanoprost (XALATAN) 0.005 % ophthalmic solution Place 1 drop into both eyes At Bedtime       Lansoprazole (PREVACID PO) Take 15 mg by mouth every morning (before breakfast)        glucosamine-chondroitin 500-400 MG CAPS Take 1 capsule by mouth daily       [DISCONTINUED] lisinopril (PRINIVIL/ZESTRIL) 10 MG tablet Take 1 tablet (10 mg) by mouth daily 90 tablet 1     [DISCONTINUED] simvastatin (ZOCOR) 20 MG tablet TAKE 1 TABLET BY MOUTH EVERY NIGHT AT BEDTIME 90 tablet 2     [DISCONTINUED] warfarin (COUMADIN) 3 MG tablet TAKE 3 TABLETS BY MOUTH ON MONDAY, WEDNESDAY, AND FRIDAY, AND 2 TABLETS BY MOUTH ON ALL OTHER DAYS OR AS DIRECTED PER WARFARIN CLINIC 221 tablet 1     Allergies   Allergen Reactions     Depakote      Increased lfts and ammonia     Zyprexa Other (See Comments)     Increased lft and increased ammonia.     Adhesive Tape Bishop Lundberg      Recent Labs   Lab Test  01/24/17   1525 12/28/16  10/14/16   0448  10/13/16   0450   09/26/16   1330   06/16/16   1457   A1C  4.8   --    --    --    --   5.2   --   5.5   LDL  86   --    --    --    --   80   --   86   HDL  61   --    --    --    --   47*   --   53   TRIG  115   --    --    --    --   121   --   83   ALT  31   --   26  31   < >  27   --   33   CR  0.61   --   0.52  0.50*   < >  0.48*   --   0.59   GFRESTIMATED  >90  Non  GFR Calc     --   >90  Non  GFR Calc    >90  Non  GFR Calc     < >   ">90  Non  GFR Calc     --   >90  Non  GFR Calc     GFRESTBLACK  >90   GFR Calc     --   >90   GFR Calc    >90   GFR Calc     < >  >90   GFR Calc     --   >90   GFR Calc     POTASSIUM  4.1   --   4.0  4.3   < >  4.1   --   4.0   TSH  6.63*  2.900   --    --    < >  <0.01*   < >  0.12*    < > = values in this interval not displayed.      BP Readings from Last 3 Encounters:   08/08/17 114/74   04/21/17 122/78   03/21/17 134/72    Wt Readings from Last 3 Encounters:   08/08/17 293 lb (132.9 kg)   04/21/17 296 lb (134.3 kg)   03/21/17 297 lb 4.8 oz (134.9 kg)                  Labs reviewed in EPIC          Reviewed and updated as needed this visit by clinical staffTobacco  Allergies  Meds       Reviewed and updated as needed this visit by Provider         ROS:  C: NEGATIVE for fever, chills, change in weight  I: NEGATIVE for worrisome rashes, moles or lesions  E: NEGATIVE for vision changes or irritation  E/M: NEGATIVE for ear, mouth and throat problems  RESP: COPD stable  B: NEGATIVE for masses, tenderness or discharge  CV: NEGATIVE for chest pain, palpitations or peripheral edema  GI: NEGATIVE for nausea, abdominal pain, heartburn, or change in bowel habits  : NEGATIVE for frequency, dysuria, or hematuria  MUSCULOSKELETAL:bilateral knee pain, chronic  N: NEGATIVE for weakness, dizziness or paresthesias  E: NEGATIVE for temperature intolerance, skin/hair changes  H: NEGATIVE for bleeding problems  P: NEGATIVE for changes in mood or affect    OBJECTIVE:     /74 (BP Location: Right arm, Patient Position: Sitting, Cuff Size: Adult Large)  Pulse 90  Temp 98.4  F (36.9  C) (Tympanic)  Resp 14  Ht 5' 10\" (1.778 m)  Wt 293 lb (132.9 kg)  SpO2 93%  BMI 42.04 kg/m2  Body mass index is 42.04 kg/(m^2).     GENERAL: healthy, alert and no distress  EYES: Eyes grossly normal to inspection, PERRL and " conjunctivae and sclerae normal  HENT: ear canals and TM's normal, nose and mouth without ulcers or lesions  NECK: no adenopathy, no asymmetry, masses, or scars and thyroid normal to palpation  RESP: lungs clear to auscultation - no rales, rhonchi or wheezes  CV: regular rate and rhythm, normal S1 S2, no S3 or S4, no murmur, click or rub, no peripheral edema and peripheral pulses strong  ABDOMEN: soft, nontender, no hepatosplenomegaly, no masses and bowel sounds normal  MS:  Bilateral posterior knee pain  PSYCH: mentation appears normal, affect normal/bright    Visit time:   30 Minutes  Time spent with patient, including examination, face to face patient education - 15 mn  Visit Content: During our face to face time, patient education was provided regarding diagnosis, and treatment pan. Patient counseled regarding disease process  All diagnosis and treatment plan are reviewed with the patient, 50 % of face to face time is directed at patient education  Record review completed    ASSESSMENT/PLAN:     Hyperlipidemia; controlled   Plan:  No changes in the patient's current treatment plan    Hypertension; controlled   Associated with the following complications:    None   Plan:  No changes in the patient's current treatment plan    COPD: Mild, controlled  Associated with the following complications:  None    Plan:  No changes in the patient's current treatment plan  COPD education: [email or web address]          1. Chronic obstructive pulmonary disease, unspecified COPD type (H)  - Nebs and home Oxygen, Bipap  - PULSE OXIMETRY, SINGLE DETERMINATION; Future  - COPD ACTION PLAN - order for Health Maintenance    2. Left knee pain, unspecified chronicity  - traMADol (ULTRAM) 50 MG tablet; TAKE 1 TO 2 TABLETS BY MOUTH EVERY 6 HOURS AS NEEDED FOR PAIN. MAX OF 6 TABLETS PER DAY  Dispense: 60 tablet; Refill: 3    3. History of pulmonary embolism  - Coumadin as prescribed    4. Benign essential hypertension  - lisinopril  (PRINIVIL/ZESTRIL) 10 MG tablet; Take 1 tablet (10 mg) by mouth daily  Dispense: 90 tablet; Refill: 1  - Basic metabolic panel  - ASPIRIN NOT PRESCRIBED (INTENTIONAL); Please choose reason not prescribed, below  Dispense: 0 each; Refill: 0    5. Chronic anticoagulation  - warfarin (COUMADIN) 3 MG tablet; TAKE 3 TABLETS BY MOUTH ON MONDAY, WEDNESDAY, AND FRIDAY, AND 2 TABLETS BY MOUTH ON ALL OTHER DAYS OR AS DIRECTED PER WARFARIN CLINIC  Dispense: 221 tablet; Refill: 1    6. Hyperlipidemia with target LDL less than 100  - Continue plan of care  - Lipid check - fasting - in December or January    7. Special screening for malignant neoplasms, colon  - Immunos occult blood    8.  Advanced Directives  - Addressed      FU visit in December, fasting    TSH with endocrinology as scheduled      Ayah Rojas NP  [facility] ([facility])

## 2017-08-08 NOTE — PATIENT INSTRUCTIONS
1. Chronic obstructive pulmonary disease, unspecified COPD type (H)  - Nebs and home Oxygen, Bipap  - PULSE OXIMETRY, SINGLE DETERMINATION; Future  - COPD ACTION PLAN - order for Health Maintenance    2. Left knee pain, unspecified chronicity  - traMADol (ULTRAM) 50 MG tablet; TAKE 1 TO 2 TABLETS BY MOUTH EVERY 6 HOURS AS NEEDED FOR PAIN. MAX OF 6 TABLETS PER DAY  Dispense: 60 tablet; Refill: 3    3. History of pulmonary embolism  - Coumadin as prescribed    4. Benign essential hypertension  - lisinopril (PRINIVIL/ZESTRIL) 10 MG tablet; Take 1 tablet (10 mg) by mouth daily  Dispense: 90 tablet; Refill: 1  - Basic metabolic panel  - ASPIRIN NOT PRESCRIBED (INTENTIONAL); Please choose reason not prescribed, below  Dispense: 0 each; Refill: 0    5. Chronic anticoagulation  - warfarin (COUMADIN) 3 MG tablet; TAKE 3 TABLETS BY MOUTH ON MONDAY, WEDNESDAY, AND FRIDAY, AND 2 TABLETS BY MOUTH ON ALL OTHER DAYS OR AS DIRECTED PER WARFARIN CLINIC  Dispense: 221 tablet; Refill: 1    6. Hyperlipidemia with target LDL less than 100  - Continue plan of care  - Lipid check - fasting - in December or January    7. Special screening for malignant neoplasms, colon  - Immunos occult blood    8.  Advanced Directives  - Addressed      FU visit in December, fasting    TSH with endocrinology as scheduled      Ayah Rojas NP  [facility]                     My Depression Action Plan  Name: Frances Corea   Date of Birth 1960  Date: 8/8/2017    My doctor: Ayah Rojas   My clinic: [facility]  [address] [place] [id number]  [phone]          [facility]   Good Control    What it looks like:     Things are going generally well. You have normal up s and down s. You may even feel depressed from time to time, but bad moods usually last less than a day.   What you need to do:  1. Continue to care for yourself (see self care plan)  2. Check your depression survival kit and update it as  needed  3. Follow your physician s recommendations including any medication.  4. Do not stop taking medication unless you consult with your physician first.           YELLOW         ZONE Getting Worse    What it looks like:     Depression is starting to interfere with your life.     It may be hard to get out of bed; you may be starting to isolate yourself from others.    Symptoms of depression are starting to last most all day and this has happened for several days.     You may have suicidal thoughts but they are not constant.   What you need to do:     1. Call your care team, your response to treatment will improve if you keep your care team informed of your progress. Yellow periods are signs an adjustment may need to be made.     2. Continue your self-care, even if you have to fake it!    3. Talk to someone in your support network    4. Open up your depression survival kit           RED    ZONE Medical Alert - Get Help    What it looks like:     Depression is seriously interfering with your life.     You may experience these or other symptoms: You can t get out of bed most days, can t work or engage in other necessary activities, you have trouble taking care of basic hygiene, or basic responsibilities, thoughts of suicide or death that will not go away, self-injurious behavior.     What you need to do:  1. Call your care team and request a same-day appointment. If they are not available (weekends or after hours) call your local crisis line, emergency room or 911.      Electronically signed by: Venus Lei, August 8, 2017    Depression Self Care Plan / Survival Kit    Self-Care for Depression  Here s the deal. Your body and mind are really not as separate as most people think.  What you do and think affects how you feel and how you feel influences what you do and think. This means if you do things that people who feel good do, it will help you feel better.  Sometimes this is all it takes.  There is also a place  for medication and therapy depending on how severe your depression is, so be sure to consult with your medical provider and/ or Behavioral Health Consultant if your symptoms are worsening or not improving.     In order to better manage my stress, I will:    Exercise  Get some form of exercise, every day. This will help reduce pain and release endorphins, the  feel good  chemicals in your brain. This is almost as good as taking antidepressants!  This is not the same as joining a gym and then never going! (they count on that by the way ) It can be as simple as just going for a walk or doing some gardening, anything that will get you moving.      Hygiene   Maintain good hygiene (Get out of bed in the morning, Make your bed, Brush your teeth, Take a shower, and Get dressed like you were going to work, even if you are unemployed).  If your clothes don't fit try to get ones that do.    Diet  I will strive to eat foods that are good for me, drink plenty of water, and avoid excessive sugar, caffeine, alcohol, and other mood-altering substances.  Some foods that are helpful in depression are: complex carbohydrates, B vitamins, flaxseed, fish or fish oil, fresh fruits and vegetables.    Psychotherapy  I agree to participate in Individual Therapy (if recommended).    Medication  If prescribed medications, I agree to take them.  Missing doses can result in serious side effects.  I understand that drinking alcohol, or other illicit drug use, may cause potential side effects.  I will not stop my medication abruptly without first discussing it with my provider.    Staying Connected With Others  I will stay in touch with my friends, family members, and my primary care provider/team.    Use your imagination  Be creative.  We all have a creative side; it doesn t matter if it s oil painting, sand castles, or mud pies! This will also kick up the endorphins.    Witness Beauty  (AKA stop and smell the roses) Take a look outside, even in  mid-winter. Notice colors, textures. Watch the squirrels and birds.     Service to others  Be of service to others.  There is always someone else in need.  By helping others we can  get out of ourselves  and remember the really important things.  This also provides opportunities for practicing all the other parts of the program.    Humor  Laugh and be silly!  Adjust your TV habits for less news and crime-drama and more comedy.    Control your stress  Try breathing deep, massage therapy, biofeedback, and meditation. Find time to relax each day.     My support system    Clinic Contact:  Phone number:    Contact 1:  Phone number:    Contact 2:  Phone number:    Islam/:  Phone number:    Therapist:  Phone number:    Local [facility] center:    Phone number:    Other community support:  Phone number:      My COPD Action Plan   Name: Frances Corea    YOB: 1960   Date: 8/8/2017    My doctor: Ayah Rojas NP   My clinic: [address] [address]  [phone]  My Controller Medicine: Albuterol nebs and home oxygen at night, bipap    Dose: as directed     My Rescue Medicine: Albuterol nebs     My Flare Up Medicine: N/A   Dose: N/A  My COPD Severity: Mild = FeV1 > 80%     Use of Oxygen: 2 Liters at night        GREEN ZONE       Doing well today      Usual level of activity and exercise    Usual amount of cough and mucus    No shortness of breath    Usual level of health (thinking clearly, sleeping well, feel like eating) Actions:      Take daily medicines    Use oxygen as prescribed    Follow regular exercise and diet plan    Avoid cigarette smoke and other irritants that harm the lungs           YELLOW ZONE          Having a bad day or flare up      Short of breath more than usual    A lot more sputum (mucus) than usual    Sputum looks yellow, green, tan, brown or bloody    More coughing or wheezing    Fever or chills    Less energy;  trouble completing activities    Trouble thinking or focusing    Using quick relief inhaler or nebulizer more often    Poor sleep; symptoms wake me up    Do not feel like eating Actions:      Get plenty of rest    Take daily medicines      If you use oxygen, call you doctor to see if you should adjust your oxygen    Do breathing exercises or other things to help you relax    Let a loved one, friend or neighbor know you are feeling worse    Call your care team if you have 2 or more symptoms.  Start taking steroids or antibiotics if directed by your care team           RED ZONE       Need medical care now      Severe shortness of breath (feel you can't breathe)    Fever, chills    Not enough breath to do any activity    Trouble coughing up mucus, walking or talking    Blood in mucus    Frequent coughing   Rescue medicines are not working    Not able to sleep because of breathing    Feel confused or drowsy    Chest pain    Actions:      Call your health care team.  If you cannot reach your care team, call 911 or go to the emergency room.        Electronically signed by: Ayah Rojas, August 8, 2017  Annual Reminders:  Meet with Care Team, Flu Shot every Fall and Pneumonia Shot at least once  Pharmacy:    [facility] [address] [place] [address] [id number] AT [facility] [address] [address]

## 2017-08-09 LAB
ANION GAP SERPL CALCULATED.3IONS-SCNC: 6 MMOL/L (ref 3–14)
BUN SERPL-MCNC: 13 MG/DL (ref 7–30)
CALCIUM SERPL-MCNC: 9.9 MG/DL (ref 8.5–10.1)
CHLORIDE SERPL-SCNC: 105 MMOL/L (ref 94–109)
CO2 SERPL-SCNC: 28 MMOL/L (ref 20–32)
CREAT SERPL-MCNC: 0.55 MG/DL (ref 0.52–1.04)
GFR SERPL CREATININE-BSD FRML MDRD: ABNORMAL ML/MIN/1.7M2
GLUCOSE SERPL-MCNC: 103 MG/DL (ref 70–99)
POTASSIUM SERPL-SCNC: 4.1 MMOL/L (ref 3.4–5.3)
SODIUM SERPL-SCNC: 139 MMOL/L (ref 133–144)

## 2017-08-09 ASSESSMENT — ANXIETY QUESTIONNAIRES: GAD7 TOTAL SCORE: 7

## 2017-08-18 NOTE — NURSING NOTE
"Chief Complaint   Patient presents with     RECHECK     Patient is here for a 3 month followup for chronic disease management. She feels things are going well.     Depression     Screen due.       Initial /74 (BP Location: Right arm, Patient Position: Sitting, Cuff Size: Adult Large)  Pulse 90  Temp 98.4  F (36.9  C) (Tympanic)  Resp 14  Ht 5' 10\" (1.778 m)  Wt 293 lb (132.9 kg)  SpO2 93%  BMI 42.04 kg/m2 Estimated body mass index is 42.04 kg/(m^2) as calculated from the following:    Height as of this encounter: 5' 10\" (1.778 m).    Weight as of this encounter: 293 lb (132.9 kg).  Medication Reconciliation: complete   Venus Lei      "

## 2017-08-25 ENCOUNTER — ANTICOAGULATION THERAPY VISIT (OUTPATIENT)
Dept: ANTICOAGULATION | Facility: OTHER | Age: 57
End: 2017-08-25

## 2017-08-25 DIAGNOSIS — Z79.01 LONG-TERM (CURRENT) USE OF ANTICOAGULANTS: ICD-10-CM

## 2017-08-25 DIAGNOSIS — Z79.01 LONG TERM (CURRENT) USE OF ANTICOAGULANTS: ICD-10-CM

## 2017-08-25 DIAGNOSIS — I26.99 PULMONARY EMBOLISM AND INFARCTION (H): ICD-10-CM

## 2017-08-25 DIAGNOSIS — Z79.899 LONG TERM USE OF DRUG: ICD-10-CM

## 2017-08-25 DIAGNOSIS — F25.1 SCHIZOAFFECTIVE DISORDER, DEPRESSIVE TYPE (H): Primary | ICD-10-CM

## 2017-08-25 LAB
BASOPHILS # BLD AUTO: 0 10E9/L (ref 0–0.2)
BASOPHILS NFR BLD AUTO: 0.2 %
DIFFERENTIAL METHOD BLD: ABNORMAL
EOSINOPHIL # BLD AUTO: 0.1 10E9/L (ref 0–0.7)
EOSINOPHIL NFR BLD AUTO: 0.7 %
ERYTHROCYTE [DISTWIDTH] IN BLOOD BY AUTOMATED COUNT: 14.9 % (ref 10–15)
HCT VFR BLD AUTO: 44.2 % (ref 35–47)
HGB BLD-MCNC: 14.2 G/DL (ref 11.7–15.7)
INR BLD: 2.2 (ref 0.86–1.14)
LYMPHOCYTES # BLD AUTO: 1.5 10E9/L (ref 0.8–5.3)
LYMPHOCYTES NFR BLD AUTO: 12.8 %
MCH RBC QN AUTO: 29 PG (ref 26.5–33)
MCHC RBC AUTO-ENTMCNC: 32.1 G/DL (ref 31.5–36.5)
MCV RBC AUTO: 90 FL (ref 78–100)
MONOCYTES # BLD AUTO: 0.5 10E9/L (ref 0–1.3)
MONOCYTES NFR BLD AUTO: 3.8 %
NEUTROPHILS # BLD AUTO: 9.8 10E9/L (ref 1.6–8.3)
NEUTROPHILS NFR BLD AUTO: 82.5 %
PLATELET # BLD AUTO: 189 10E9/L (ref 150–450)
RBC # BLD AUTO: 4.9 10E12/L (ref 3.8–5.2)
WBC # BLD AUTO: 11.8 10E9/L (ref 4–11)

## 2017-08-25 PROCEDURE — 36416 COLLJ CAPILLARY BLOOD SPEC: CPT | Mod: ZL | Performed by: NURSE PRACTITIONER

## 2017-08-25 PROCEDURE — 84480 ASSAY TRIIODOTHYRONINE (T3): CPT | Mod: ZL | Performed by: NURSE PRACTITIONER

## 2017-08-25 PROCEDURE — 84439 ASSAY OF FREE THYROXINE: CPT | Mod: ZL | Performed by: NURSE PRACTITIONER

## 2017-08-25 PROCEDURE — 80061 LIPID PANEL: CPT | Mod: ZL | Performed by: NURSE PRACTITIONER

## 2017-08-25 PROCEDURE — 80076 HEPATIC FUNCTION PANEL: CPT | Mod: ZL | Performed by: NURSE PRACTITIONER

## 2017-08-25 PROCEDURE — 40000788 ZZHCL STATISTIC ESTIMATED AVERAGE GLUCOSE: Mod: ZL | Performed by: NURSE PRACTITIONER

## 2017-08-25 PROCEDURE — 85610 PROTHROMBIN TIME: CPT | Mod: QW,ZL | Performed by: NURSE PRACTITIONER

## 2017-08-25 PROCEDURE — 84443 ASSAY THYROID STIM HORMONE: CPT | Mod: ZL | Performed by: NURSE PRACTITIONER

## 2017-08-25 PROCEDURE — 83036 HEMOGLOBIN GLYCOSYLATED A1C: CPT | Mod: ZL | Performed by: NURSE PRACTITIONER

## 2017-08-25 PROCEDURE — 80178 ASSAY OF LITHIUM: CPT | Mod: ZL | Performed by: NURSE PRACTITIONER

## 2017-08-25 PROCEDURE — 99000 SPECIMEN HANDLING OFFICE-LAB: CPT | Performed by: NURSE PRACTITIONER

## 2017-08-25 PROCEDURE — 80048 BASIC METABOLIC PNL TOTAL CA: CPT | Mod: ZL | Performed by: NURSE PRACTITIONER

## 2017-08-25 PROCEDURE — 85025 COMPLETE CBC W/AUTO DIFF WBC: CPT | Mod: ZL | Performed by: NURSE PRACTITIONER

## 2017-08-25 NOTE — PROGRESS NOTES
"  ANTICOAGULATION FOLLOW-UP CLINIC VISIT    Patient Name:  Frances Corea  Date:  8/25/2017  Contact Type:  Telephone/ message left on home phone of her mother    SUBJECTIVE:     Patient Findings     Comments Call placed to patient mother\"s phone as we have been instructed and message left re: INR result, warfarin dosing and INR recheck date. Family to notify warfarin clinic if patient has any bleeding/bruising, changes in diet/meds/activity or questions.            OBJECTIVE    INR Point of Care   Date Value Ref Range Status   08/25/2017 2.2 (H) 0.86 - 1.14 Final     Comment:     This test is intended for monitoring Coumadin therapy.  Results are not   accurate in patients with prolonged INR due to factor deficiency.         ASSESSMENT / PLAN  INR assessment THER    Recheck INR In: 5 WEEKS    INR Location Clinic      Anticoagulation Summary as of 8/25/2017     INR goal 2.0-3.0   Today's INR 2.2   Maintenance plan 9 mg (3 mg x 3) on Mon, Wed, Fri; 6 mg (3 mg x 2) all other days   Full instructions 9 mg on Mon, Wed, Fri; 6 mg all other days   Weekly total 51 mg   Plan last modified Stefany Huynh RN (8/25/2017)   Next INR check 9/28/2017   Target end date     Indications   Chronic deep vein thrombosis (DVT) of left lower extremity (H) [I82.502]  Long-term (current) use of anticoagulants [Z79.01] [Z79.01]  Pulmonary embolism with infarction (H) [I26.99] (Resolved) [I26.99]         Anticoagulation Episode Summary     INR check location     Preferred lab     Send INR reminders to  ANTICOAG POOL    Comments       Anticoagulation Care Providers     Provider Role Specialty Phone number    Ayah Rojas NP [facility] [phone]            See the Encounter Report to view Anticoagulation Flowsheet and Dosing Calendar (Go to Encounters tab in chart review, and find the Anticoagulation Therapy Visit)        Stefany Huynh RN               "

## 2017-08-25 NOTE — MR AVS SNAPSHOT
Frances Corea   8/25/2017   Anticoagulation Therapy Visit    Description:  57 year old female   Provider:  Ayah Rojas NP   Department:  [facility] Anti Coagulation           INR as of 8/25/2017     Today's INR 2.2      Anticoagulation Summary as of 8/25/2017     INR goal 2.0-3.0   Today's INR 2.2   Full instructions 9 mg on Mon, Wed, Fri; 6 mg all other days   Next INR check 9/28/2017    Indications   Chronic deep vein thrombosis (DVT) of left lower extremity (H) [I82.502]  Long-term (current) use of anticoagulants [Z79.01] [Z79.01]  Pulmonary embolism with infarction (H) [I26.99] (Resolved) [I26.99]         August 2017 Details    Sun Mon Tue Wed Thu Fri Sat       1               2               3               4               5                 6               7               8               9               10               11               12                 13               14               15               16               17               18               19                 20               21               22               23               24               25      9 mg   See details      26      6 mg           27      6 mg         28      9 mg         29      6 mg         30      9 mg         31      6 mg            Date Details   08/25 This INR check               How to take your warfarin dose     To take:  6 mg Take 2 of the 3 mg tablets.    To take:  9 mg Take 3 of the 3 mg tablets.           September 2017 Details    Sun Mon Tue Wed Thu Fri Sat          1      9 mg         2      6 mg           3      6 mg         4      9 mg         5      6 mg         6      9 mg         7      6 mg         8      9 mg         9      6 mg           10      6 mg         11      9 mg         12      6 mg         13      9 mg         14      6 mg         15      9 mg         16      6 mg           17      6 mg         18      9 mg         19      6 mg         20      9 mg         21      6 mg         22      9 mg          23      6 mg           24      6 mg         25      9 mg         26      6 mg         27      9 mg         28            29               30                Date Details   No additional details    Date of next INR:  9/28/2017         How to take your warfarin dose     To take:  6 mg Take 2 of the 3 mg tablets.    To take:  9 mg Take 3 of the 3 mg tablets.

## 2017-08-28 LAB
ALBUMIN SERPL-MCNC: 3.9 G/DL (ref 3.4–5)
ALP SERPL-CCNC: 117 U/L (ref 40–150)
ALT SERPL W P-5'-P-CCNC: 27 U/L (ref 0–50)
ANION GAP SERPL CALCULATED.3IONS-SCNC: 7 MMOL/L (ref 3–14)
AST SERPL W P-5'-P-CCNC: 16 U/L (ref 0–45)
BILIRUB DIRECT SERPL-MCNC: 0.1 MG/DL (ref 0–0.2)
BILIRUB SERPL-MCNC: 0.8 MG/DL (ref 0.2–1.3)
BUN SERPL-MCNC: 15 MG/DL (ref 7–30)
CALCIUM SERPL-MCNC: 9.5 MG/DL (ref 8.5–10.1)
CHLORIDE SERPL-SCNC: 104 MMOL/L (ref 94–109)
CHOLEST SERPL-MCNC: 192 MG/DL
CO2 SERPL-SCNC: 29 MMOL/L (ref 20–32)
CREAT SERPL-MCNC: 0.53 MG/DL (ref 0.52–1.04)
EST. AVERAGE GLUCOSE BLD GHB EST-MCNC: 97 MG/DL
GFR SERPL CREATININE-BSD FRML MDRD: >90 ML/MIN/1.7M2
GLUCOSE SERPL-MCNC: 115 MG/DL (ref 70–99)
HBA1C MFR BLD: 5 % (ref 4.3–6)
HDLC SERPL-MCNC: 54 MG/DL
LDLC SERPL CALC-MCNC: 112 MG/DL
LITHIUM SERPL-SCNC: 0.6 MMOL/L (ref 0.6–1.2)
NONHDLC SERPL-MCNC: 138 MG/DL
POTASSIUM SERPL-SCNC: 4.1 MMOL/L (ref 3.4–5.3)
PROT SERPL-MCNC: 7.7 G/DL (ref 6.8–8.8)
SODIUM SERPL-SCNC: 140 MMOL/L (ref 133–144)
T3 SERPL-MCNC: 92 NG/DL (ref 60–181)
T4 FREE SERPL-MCNC: 0.89 NG/DL (ref 0.76–1.46)
TRIGL SERPL-MCNC: 128 MG/DL
TSH SERPL DL<=0.005 MIU/L-ACNC: 2.2 MU/L (ref 0.4–4)

## 2017-09-28 ENCOUNTER — ANTICOAGULATION THERAPY VISIT (OUTPATIENT)
Dept: ANTICOAGULATION | Facility: OTHER | Age: 57
End: 2017-09-28

## 2017-09-28 DIAGNOSIS — Z12.11 SPECIAL SCREENING FOR MALIGNANT NEOPLASMS, COLON: ICD-10-CM

## 2017-09-28 DIAGNOSIS — Z79.01 LONG-TERM (CURRENT) USE OF ANTICOAGULANTS: ICD-10-CM

## 2017-09-28 DIAGNOSIS — Z79.01 LONG TERM (CURRENT) USE OF ANTICOAGULANTS: ICD-10-CM

## 2017-09-28 DIAGNOSIS — I26.99 PULMONARY EMBOLISM AND INFARCTION (H): ICD-10-CM

## 2017-09-28 LAB
HEMOCCULT SP1 STL QL: NEGATIVE
INR BLD: 1.9 (ref 0.86–1.14)

## 2017-09-28 PROCEDURE — 36416 COLLJ CAPILLARY BLOOD SPEC: CPT | Mod: ZL | Performed by: NURSE PRACTITIONER

## 2017-09-28 PROCEDURE — 82274 ASSAY TEST FOR BLOOD FECAL: CPT | Mod: ZL | Performed by: NURSE PRACTITIONER

## 2017-09-28 PROCEDURE — 85610 PROTHROMBIN TIME: CPT | Mod: QW,ZL | Performed by: NURSE PRACTITIONER

## 2017-09-28 NOTE — PROGRESS NOTES
ANTICOAGULATION FOLLOW-UP CLINIC VISIT    Patient Name:  Frances Corea  Date:  9/28/2017  Contact Type:  Telephone/ message left on mother's home phone voicemail    SUBJECTIVE:     Patient Findings     Comments Call placed to patient mother's phone and message left on voicemail re: INR result, warfarin dosing and INR recheck date. She is to notify warfarin clinic if any bleeding/bruising, changes in diet/meds/activity or questions.            OBJECTIVE    INR Point of Care   Date Value Ref Range Status   09/28/2017 1.9 (H) 0.86 - 1.14 Final     Comment:     This test is intended for monitoring Coumadin therapy.  Results are not   accurate in patients with prolonged INR due to factor deficiency.         ASSESSMENT / PLAN  INR assessment THER    Recheck INR In: 4 WEEKS    INR Location Clinic      Anticoagulation Summary as of 9/28/2017     INR goal 2.0-3.0   Today's INR 1.9!   Maintenance plan 9 mg (3 mg x 3) on Mon, Wed, Fri; 6 mg (3 mg x 2) all other days   Full instructions 9/28: 9 mg; Otherwise 9 mg on Mon, Wed, Fri; 6 mg all other days   Weekly total 51 mg   Plan last modified Stefany Huynh RN (8/25/2017)   Next INR check 10/26/2017   Target end date     Indications   Chronic deep vein thrombosis (DVT) of left lower extremity (H) [I82.502]  Long-term (current) use of anticoagulants [Z79.01] [Z79.01]  Pulmonary embolism with infarction (H) [I26.99] (Resolved) [I26.99]         Anticoagulation Episode Summary     INR check location     Preferred lab     Send INR reminders to  ANTICOAG POOL    Comments       Anticoagulation Care Providers     Provider Role Specialty Phone number    Ayah Rojas NP [facility] [phone]            See the Encounter Report to view Anticoagulation Flowsheet and Dosing Calendar (Go to Encounters tab in chart review, and find the Anticoagulation Therapy Visit)        Stefany Huynh, RN

## 2017-09-28 NOTE — MR AVS SNAPSHOT
Frances Corea   9/28/2017   Anticoagulation Therapy Visit    Description:  57 year old female   Provider:  Ayah Rojas NP   Department:  [facility] Anti Coagulation           INR as of 9/28/2017     Today's INR 1.9!      Anticoagulation Summary as of 9/28/2017     INR goal 2.0-3.0   Today's INR 1.9!   Full instructions 9/28: 9 mg; Otherwise 9 mg on Mon, Wed, Fri; 6 mg all other days   Next INR check 10/26/2017    Indications   Chronic deep vein thrombosis (DVT) of left lower extremity (H) [I82.502]  Long-term (current) use of anticoagulants [Z79.01] [Z79.01]  Pulmonary embolism with infarction (H) [I26.99] (Resolved) [I26.99]         September 2017 Details    Sun Mon Tue Wed Thu Fri Sat          1               2                 3               4               5               6               7               8               9                 10               11               12               13               14               15               16                 17               18               19               20               21               22               23                 24               25               26               27               28      9 mg   See details      29      9 mg         30      6 mg          Date Details   09/28 This INR check               How to take your warfarin dose     To take:  6 mg Take 2 of the 3 mg tablets.    To take:  9 mg Take 3 of the 3 mg tablets.           October 2017 Details    Sun Mon Tue Wed Thu Fri Sat     1      6 mg         2      9 mg         3      6 mg         4      9 mg         5      6 mg         6      9 mg         7      6 mg           8      6 mg         9      9 mg         10      6 mg         11      9 mg         12      6 mg         13      9 mg         14      6 mg           15      6 mg         16      9 mg         17      6 mg         18      9 mg         19      6 mg         20      9 mg         21      6 mg           22      6 mg         23      9  mg         24      6 mg         25      9 mg         26            27               28                 29               30               31                    Date Details   No additional details    Date of next INR:  10/26/2017         How to take your warfarin dose     To take:  6 mg Take 2 of the 3 mg tablets.    To take:  9 mg Take 3 of the 3 mg tablets.

## 2017-10-09 DIAGNOSIS — L20.9 ATOPIC DERMATITIS, UNSPECIFIED TYPE: Primary | ICD-10-CM

## 2017-10-09 RX ORDER — MOMETASONE FUROATE 1 MG/G
CREAM TOPICAL
Qty: 45 G | Refills: 0 | Status: SHIPPED | OUTPATIENT
Start: 2017-10-09 | End: 2018-02-08

## 2017-10-09 NOTE — TELEPHONE ENCOUNTER
Mom calls, pt has had cream from  , not on her med ist and never filled here.  Uses when she gets perms and hair dye.  Milder thann clobetasol cream.emilio gallegos refill.  Got info from mom on the tube

## 2017-10-10 DIAGNOSIS — L20.9 ATOPIC DERMATITIS, UNSPECIFIED TYPE: ICD-10-CM

## 2017-10-10 RX ORDER — CLOBETASOL PROPIONATE 0.5 MG/G
OINTMENT TOPICAL
Qty: 60 G | Refills: 0 | Status: SHIPPED | OUTPATIENT
Start: 2017-10-10 | End: 2017-11-15

## 2017-10-26 ENCOUNTER — ANTICOAGULATION THERAPY VISIT (OUTPATIENT)
Dept: ANTICOAGULATION | Facility: OTHER | Age: 57
End: 2017-10-26

## 2017-10-26 DIAGNOSIS — Z79.01 LONG-TERM (CURRENT) USE OF ANTICOAGULANTS: ICD-10-CM

## 2017-10-26 DIAGNOSIS — I82.502 CHRONIC DEEP VEIN THROMBOSIS (DVT) OF LEFT LOWER EXTREMITY (H): ICD-10-CM

## 2017-10-26 DIAGNOSIS — Z79.01 LONG TERM CURRENT USE OF ANTICOAGULANT THERAPY: ICD-10-CM

## 2017-10-26 DIAGNOSIS — I26.99 PULMONARY EMBOLISM AND INFARCTION (H): ICD-10-CM

## 2017-10-26 LAB — INR BLD: 1.7 (ref 0.86–1.14)

## 2017-10-26 PROCEDURE — 36416 COLLJ CAPILLARY BLOOD SPEC: CPT | Mod: ZL | Performed by: NURSE PRACTITIONER

## 2017-10-26 PROCEDURE — 85610 PROTHROMBIN TIME: CPT | Mod: QW,ZL | Performed by: NURSE PRACTITIONER

## 2017-10-26 NOTE — PROGRESS NOTES
ANTICOAGULATION FOLLOW-UP CLINIC VISIT    Patient Name:  Frances Corea  Date:  10/26/2017  Contact Type:  Telephone/ message left on patient mother home phone voicemail    SUBJECTIVE:     Patient Findings     Comments Call placed to patient motherFrances and message left on her cell phone re: INR result, warfarin dosing and INR recheck date. She is to notify warfarin clinic if patient has any bleeding/bruising, changes in diet/meds/activity or questions.            OBJECTIVE    INR Point of Care   Date Value Ref Range Status   10/26/2017 1.7 (H) 0.86 - 1.14 Final     Comment:     This test is intended for monitoring Coumadin therapy.  Results are not   accurate in patients with prolonged INR due to factor deficiency.         ASSESSMENT / PLAN  INR assessment SUB    Recheck INR In: 3 WEEKS    INR Location Clinic      Anticoagulation Summary as of 10/26/2017     INR goal 2.0-3.0   Today's INR 1.7!   Maintenance plan 6 mg (3 mg x 2) on Mon, Wed, Fri; 9 mg (3 mg x 3) all other days   Full instructions 10/26: 12 mg; Otherwise 6 mg on Mon, Wed, Fri; 9 mg all other days   Weekly total 54 mg   Plan last modified Stefany Huynh RN (10/26/2017)   Next INR check 11/16/2017   Target end date     Indications   Chronic deep vein thrombosis (DVT) of left lower extremity (H) [I82.502]  Long-term (current) use of anticoagulants [Z79.01] [Z79.01]  Pulmonary embolism with infarction (H) [I26.99] (Resolved) [I26.99]         Anticoagulation Episode Summary     INR check location     Preferred lab     Send INR reminders to [facility] POOL    Comments call her mother with coumadin dosing and rechecks.  She lives with her mother. Francse has some mental disabilities so her mother takes care of warfarin dosing and recheck date.      Anticoagulation Care Providers     Provider Role Specialty Phone number    Ayah Rojsa NP [facility] [phone]            See the Encounter Report to view Anticoagulation Flowsheet and  Dosing Calendar (Go to Encounters tab in chart review, and find the Anticoagulation Therapy Visit)        Stefany Huynh RN

## 2017-10-26 NOTE — MR AVS SNAPSHOT
Frances Corea   10/26/2017   Anticoagulation Therapy Visit    Description:  57 year old female   Provider:  Ayah Rojas NP   Department:  [facility] Anti Coagulation           INR as of 10/26/2017     Today's INR 1.7!      Anticoagulation Summary as of 10/26/2017     INR goal 2.0-3.0   Today's INR 1.7!   Full instructions 10/26: 12 mg; Otherwise 6 mg on Mon, Wed, Fri; 9 mg all other days   Next INR check 11/16/2017    Indications   Chronic deep vein thrombosis (DVT) of left lower extremity (H) [I82.502]  Long-term (current) use of anticoagulants [Z79.01] [Z79.01]  Pulmonary embolism with infarction (H) [I26.99] (Resolved) [I26.99]         October 2017 Details    Sun Mon Tue Wed Thu Fri Sat     1               2               3               4               5               6               7                 8               9               10               11               12               13               14                 15               16               17               18               19               20               21                 22               23               24               25               26      12 mg   See details      27      6 mg         28      9 mg           29      9 mg         30      6 mg         31      9 mg              Date Details   10/26 This INR check               How to take your warfarin dose     To take:  6 mg Take 2 of the 3 mg tablets.    To take:  9 mg Take 3 of the 3 mg tablets.    To take:  12 mg Take 4 of the 3 mg tablets.           November 2017 Details    Sun Mon Tue Wed Thu Fri Sat        1      6 mg         2      9 mg         3      6 mg         4      9 mg           5      9 mg         6      6 mg         7      9 mg         8      6 mg         9      9 mg         10      6 mg         11      9 mg           12      9 mg         13      6 mg         14      9 mg         15      6 mg         16            17               18                 19               20                21               22               23               24               25                 26               27               28               29               30                  Date Details   No additional details    Date of next INR:  11/16/2017         How to take your warfarin dose     To take:  6 mg Take 2 of the 3 mg tablets.    To take:  9 mg Take 3 of the 3 mg tablets.

## 2017-11-15 RX ORDER — LITHIUM CARBONATE 300 MG
300 TABLET ORAL
COMMUNITY

## 2017-11-16 ENCOUNTER — ANTICOAGULATION THERAPY VISIT (OUTPATIENT)
Dept: ANTICOAGULATION | Facility: OTHER | Age: 57
End: 2017-11-16

## 2017-11-16 DIAGNOSIS — Z79.01 LONG TERM CURRENT USE OF ANTICOAGULANT THERAPY: ICD-10-CM

## 2017-11-16 DIAGNOSIS — Z79.01 CHRONIC ANTICOAGULATION: ICD-10-CM

## 2017-11-16 DIAGNOSIS — I82.502 CHRONIC DEEP VEIN THROMBOSIS (DVT) OF LEFT LOWER EXTREMITY (H): ICD-10-CM

## 2017-11-16 DIAGNOSIS — Z79.01 LONG-TERM (CURRENT) USE OF ANTICOAGULANTS: ICD-10-CM

## 2017-11-16 DIAGNOSIS — I26.99 PULMONARY EMBOLISM AND INFARCTION (H): ICD-10-CM

## 2017-11-16 LAB — INR BLD: 2.8 (ref 0.86–1.14)

## 2017-11-16 PROCEDURE — 36416 COLLJ CAPILLARY BLOOD SPEC: CPT | Mod: ZL | Performed by: NURSE PRACTITIONER

## 2017-11-16 PROCEDURE — 85610 PROTHROMBIN TIME: CPT | Mod: QW,ZL | Performed by: NURSE PRACTITIONER

## 2017-11-16 RX ORDER — WARFARIN SODIUM 3 MG/1
TABLET ORAL
Qty: 221 TABLET | Refills: 1 | COMMUNITY
Start: 2017-11-16 | End: 2018-08-13

## 2017-11-16 NOTE — PROGRESS NOTES
ANTICOAGULATION FOLLOW-UP CLINIC VISIT    Patient Name:  Frances Corea  Date:  11/16/2017  Contact Type:  Telephone/ Frances Seo-mother    SUBJECTIVE:     Patient Findings     Positives No Problem Findings    Comments INR results/Coumadin dosing instructions and INR recheck information left on the pt's mother's voice messaging system today.  She was informed if there are new pt issues/concerns or questions, to contact the AC clinic.  Call ended.           OBJECTIVE    INR Point of Care   Date Value Ref Range Status   11/16/2017 2.8 (H) 0.86 - 1.14 Final     Comment:     This test is intended for monitoring Coumadin therapy.  Results are not   accurate in patients with prolonged INR due to factor deficiency.         ASSESSMENT / PLAN  INR assessment THER    Recheck INR In: 5 WEEKS    INR Location Clinic      Anticoagulation Summary as of 11/16/2017     INR goal 2.0-3.0   Today's INR 2.8   Maintenance plan 6 mg (3 mg x 2) on Mon, Wed, Fri; 9 mg (3 mg x 3) all other days   Full instructions 6 mg on Mon, Wed, Fri; 9 mg all other days   Weekly total 54 mg   No change documented Stefany Marquez RN   Plan last modified Stefany Huynh RN (10/26/2017)   Next INR check 12/22/2017   Priority INR   Target end date Indefinite    Indications   Chronic deep vein thrombosis (DVT) of left lower extremity (H) [I82.502]  Long-term (current) use of anticoagulants [Z79.01] [Z79.01]  Pulmonary embolism with infarction (H) [I26.99] (Resolved) [I26.99]         Anticoagulation Episode Summary     INR check location     Preferred lab     Send INR reminders to  PA POOL    Comments Lives w/mother - Always contact mother with coumadin dosing and rechecks.        Anticoagulation Care Providers     Provider Role Specialty Phone number    Ayah Rojas NP [facility] [phone]            See the Encounter Report to view Anticoagulation Flowsheet and Dosing Calendar (Go to Encounters tab in chart review, and find  the Anticoagulation Therapy Visit)        Stefany Marquez RN

## 2017-11-16 NOTE — MR AVS SNAPSHOT
Frances Corea   11/16/2017   Anticoagulation Therapy Visit    Description:  57 year old female   Provider:  Ayah Rojas NP   Department:  [facility]           INR as of 11/16/2017     Today's INR 2.8      Anticoagulation Summary as of 11/16/2017     INR goal 2.0-3.0   Today's INR 2.8   Full instructions 6 mg on Mon, Wed, Fri; 9 mg all other days   Next INR check 12/22/2017    Indications   Chronic deep vein thrombosis (DVT) of left lower extremity (H) [I82.502]  Long-term (current) use of anticoagulants [Z79.01] [Z79.01]  Pulmonary embolism with infarction (H) [I26.99] (Resolved) [I26.99]         November 2017 Details    Sun Mon Tue Wed Thu Fri Sat        1               2               3               4                 5               6               7               8               9               10               11                 12               13               14               15               16      9 mg   See details      17      6 mg         18      9 mg           19      9 mg         20      6 mg         21      9 mg         22      6 mg         23      9 mg         24      6 mg         25      9 mg           26      9 mg         27      6 mg         28      9 mg         29      6 mg         30      9 mg            Date Details   11/16 This INR check               How to take your warfarin dose     To take:  6 mg Take 2 of the 3 mg tablets.    To take:  9 mg Take 3 of the 3 mg tablets.           December 2017 Details    Sun Mon Tue Wed Thu Fri Sat          1      6 mg         2      9 mg           3      9 mg         4      6 mg         5      9 mg         6      6 mg         7      9 mg         8      6 mg         9      9 mg           10      9 mg         11      6 mg         12      9 mg         13      6 mg         14      9 mg         15      6 mg         16      9 mg           17      9 mg         18      6 mg         19      9 mg         20      6 mg         21      9 mg          22            23                 24               25               26               27               28               29               30                 31                      Date Details   No additional details    Date of next INR:  12/22/2017         How to take your warfarin dose     To take:  6 mg Take 2 of the 3 mg tablets.    To take:  9 mg Take 3 of the 3 mg tablets.

## 2017-11-27 DIAGNOSIS — M25.562 LEFT KNEE PAIN, UNSPECIFIED CHRONICITY: ICD-10-CM

## 2017-11-28 NOTE — TELEPHONE ENCOUNTER
Tramadol      Last Written Prescription Date: 8/8/17  Last Fill Quantity: 60,  # refills: 3   Last Office Visit with G, P or [facility] prescribing provider: 8/8/17

## 2017-11-29 ENCOUNTER — TRANSFERRED RECORDS (OUTPATIENT)
Dept: HEALTH INFORMATION MANAGEMENT | Facility: HOSPITAL | Age: 57
End: 2017-11-29

## 2017-11-30 RX ORDER — TRAMADOL HYDROCHLORIDE 50 MG/1
TABLET ORAL
Qty: 60 TABLET | Refills: 0 | Status: SHIPPED | OUTPATIENT
Start: 2017-11-30 | End: 2017-12-26

## 2017-12-22 ENCOUNTER — ANTICOAGULATION THERAPY VISIT (OUTPATIENT)
Dept: ANTICOAGULATION | Facility: OTHER | Age: 57
End: 2017-12-22
Payer: COMMERCIAL

## 2017-12-22 DIAGNOSIS — Z79.01 LONG-TERM (CURRENT) USE OF ANTICOAGULANTS: ICD-10-CM

## 2017-12-22 DIAGNOSIS — I82.502 CHRONIC DEEP VEIN THROMBOSIS (DVT) OF LEFT LOWER EXTREMITY (H): ICD-10-CM

## 2017-12-22 NOTE — MR AVS SNAPSHOT
Frances Corea   12/22/2017   Anticoagulation Therapy Visit    Description:  57 year old female   Provider:  Ayah Rojas NP   Department:  [facility] Anti Coagulation           INR as of 12/22/2017     Today's INR No new INR was available at the time of this encounter.      Anticoagulation Summary as of 12/22/2017     INR goal 2.0-3.0   Today's INR No new INR was available at the time of this encounter.   Full instructions 6 mg on Mon, Wed, Fri; 9 mg all other days   Next INR check 12/26/2017    Indications   Chronic deep vein thrombosis (DVT) of left lower extremity (H) [I82.502]  Long-term (current) use of anticoagulants [Z79.01] [Z79.01]  Pulmonary embolism with infarction (H) [I26.99] (Resolved) [I26.99]         December 2017 Details    Sun Mon Tue Wed Thu Fri Sat          1               2                 3               4               5               6               7               8               9                 10               11               12               13               14               15               16                 17               18               19               20               21               22      6 mg   See details      23      9 mg           24      9 mg         25      6 mg         26            27               28               29               30                 31                      Date Details   12/22 This INR check       Date of next INR:  12/26/2017         How to take your warfarin dose     To take:  6 mg Take 2 of the 3 mg tablets.    To take:  9 mg Take 3 of the 3 mg tablets.

## 2017-12-22 NOTE — PROGRESS NOTES
ANTICOAGULATION FOLLOW-UP CLINIC VISIT    Patient Name:  Frances Corea  Date:  12/22/2017  Contact Type:  Telephone/ Frances Shepardtnik-mother    SUBJECTIVE:     Patient Findings     Positives Other complaints    Comments Frances, pt's mother, telephoned/left voice message with the AC clinic reporting the pt did not feel well today, therefore the INR recheck would be postponed.  She states the pt has an appt w/PCP on 12/26/17 and would get it done then.  Call ended with Frances stating if there were questions to contact her.  Chart reviewed and the recheck ok to do in 4 days.           OBJECTIVE    INR Point of Care   Date Value Ref Range Status   11/16/2017 2.8 (H) 0.86 - 1.14 Final     Comment:     This test is intended for monitoring Coumadin therapy.  Results are not   accurate in patients with prolonged INR due to factor deficiency.         ASSESSMENT / PLAN  No question data found.  Anticoagulation Summary as of 12/22/2017     INR goal 2.0-3.0   Today's INR No new INR was available at the time of this encounter.   Maintenance plan 6 mg (3 mg x 2) on Mon, Wed, Fri; 9 mg (3 mg x 3) all other days   Full instructions 6 mg on Mon, Wed, Fri; 9 mg all other days   Weekly total 54 mg   No change documented Stefany Marquez RN   Plan last modified Stefany Huynh, RN (10/26/2017)   Next INR check 12/26/2017   Priority INR   Target end date Indefinite    Indications   Chronic deep vein thrombosis (DVT) of left lower extremity (H) [I82.502]  Long-term (current) use of anticoagulants [Z79.01] [Z79.01]  Pulmonary embolism with infarction (H) [I26.99] (Resolved) [I26.99]         Anticoagulation Episode Summary     INR check location     Preferred lab     Send INR reminders to  ANTICOAG POOL    Comments Lives w/mother - Always contact mother with coumadin dosing and rechecks.        Anticoagulation Care Providers     Provider Role Specialty Phone number    Ayah Rojas NP [facility] [phone]             See the Encounter Report to view Anticoagulation Flowsheet and Dosing Calendar (Go to Encounters tab in chart review, and find the Anticoagulation Therapy Visit)        Stefany Marquez RN

## 2017-12-26 ENCOUNTER — OFFICE VISIT (OUTPATIENT)
Dept: FAMILY MEDICINE | Facility: OTHER | Age: 57
End: 2017-12-26
Attending: NURSE PRACTITIONER
Payer: MEDICARE

## 2017-12-26 ENCOUNTER — ANTICOAGULATION THERAPY VISIT (OUTPATIENT)
Dept: ANTICOAGULATION | Facility: OTHER | Age: 57
End: 2017-12-26

## 2017-12-26 VITALS
DIASTOLIC BLOOD PRESSURE: 68 MMHG | TEMPERATURE: 98.4 F | OXYGEN SATURATION: 94 % | BODY MASS INDEX: 41.95 KG/M2 | HEART RATE: 94 BPM | HEIGHT: 70 IN | SYSTOLIC BLOOD PRESSURE: 110 MMHG | WEIGHT: 293 LBS

## 2017-12-26 DIAGNOSIS — I10 BENIGN ESSENTIAL HYPERTENSION: Primary | ICD-10-CM

## 2017-12-26 DIAGNOSIS — Z51.81 ENCOUNTER FOR MONITORING STATIN THERAPY: ICD-10-CM

## 2017-12-26 DIAGNOSIS — Z79.899 ENCOUNTER FOR MONITORING STATIN THERAPY: ICD-10-CM

## 2017-12-26 DIAGNOSIS — F33.1 MODERATE EPISODE OF RECURRENT MAJOR DEPRESSIVE DISORDER (H): ICD-10-CM

## 2017-12-26 DIAGNOSIS — Z86.718 HISTORY OF DEEP VENOUS THROMBOSIS: ICD-10-CM

## 2017-12-26 DIAGNOSIS — R73.09 ELEVATED GLUCOSE: ICD-10-CM

## 2017-12-26 DIAGNOSIS — R06.02 SOB (SHORTNESS OF BREATH): ICD-10-CM

## 2017-12-26 DIAGNOSIS — K21.9 GASTROESOPHAGEAL REFLUX DISEASE WITHOUT ESOPHAGITIS: ICD-10-CM

## 2017-12-26 DIAGNOSIS — E78.5 HYPERLIPIDEMIA WITH TARGET LDL LESS THAN 100: ICD-10-CM

## 2017-12-26 DIAGNOSIS — E03.9 ACQUIRED HYPOTHYROIDISM: ICD-10-CM

## 2017-12-26 DIAGNOSIS — M25.562 LEFT KNEE PAIN, UNSPECIFIED CHRONICITY: ICD-10-CM

## 2017-12-26 DIAGNOSIS — Z51.81 ENCOUNTER FOR THERAPEUTIC DRUG MONITORING: ICD-10-CM

## 2017-12-26 DIAGNOSIS — E55.9 VITAMIN D DEFICIENCY: ICD-10-CM

## 2017-12-26 DIAGNOSIS — Z79.01 CHRONIC ANTICOAGULATION: Chronic | ICD-10-CM

## 2017-12-26 PROBLEM — Z99.81 ON HOME OXYGEN THERAPY: Status: ACTIVE | Noted: 2017-12-26

## 2017-12-26 PROBLEM — Z99.89 BIPAP (BIPHASIC POSITIVE AIRWAY PRESSURE) DEPENDENCE: Status: ACTIVE | Noted: 2017-12-26

## 2017-12-26 LAB
ALBUMIN UR-MCNC: NEGATIVE MG/DL
APPEARANCE UR: CLEAR
BASOPHILS # BLD AUTO: 0 10E9/L (ref 0–0.2)
BASOPHILS NFR BLD AUTO: 0.3 %
BILIRUB UR QL STRIP: NEGATIVE
COLOR UR AUTO: YELLOW
DIFFERENTIAL METHOD BLD: ABNORMAL
EOSINOPHIL # BLD AUTO: 0.1 10E9/L (ref 0–0.7)
EOSINOPHIL NFR BLD AUTO: 0.6 %
ERYTHROCYTE [DISTWIDTH] IN BLOOD BY AUTOMATED COUNT: 15.1 % (ref 10–15)
GLUCOSE UR STRIP-MCNC: NEGATIVE MG/DL
HCT VFR BLD AUTO: 45 % (ref 35–47)
HGB BLD-MCNC: 13.8 G/DL (ref 11.7–15.7)
HGB UR QL STRIP: NEGATIVE
INR BLD: 3.1 (ref 0.86–1.14)
KETONES UR STRIP-MCNC: NEGATIVE MG/DL
LEUKOCYTE ESTERASE UR QL STRIP: NEGATIVE
LYMPHOCYTES # BLD AUTO: 1.6 10E9/L (ref 0.8–5.3)
LYMPHOCYTES NFR BLD AUTO: 13.5 %
MCH RBC QN AUTO: 28.3 PG (ref 26.5–33)
MCHC RBC AUTO-ENTMCNC: 30.7 G/DL (ref 31.5–36.5)
MCV RBC AUTO: 92 FL (ref 78–100)
MONOCYTES # BLD AUTO: 0.6 10E9/L (ref 0–1.3)
MONOCYTES NFR BLD AUTO: 5 %
NEUTROPHILS # BLD AUTO: 9.5 10E9/L (ref 1.6–8.3)
NEUTROPHILS NFR BLD AUTO: 80.6 %
NITRATE UR QL: NEGATIVE
PH UR STRIP: 7 PH (ref 5–7)
PLATELET # BLD AUTO: 161 10E9/L (ref 150–450)
RBC # BLD AUTO: 4.88 10E12/L (ref 3.8–5.2)
SOURCE: NORMAL
SP GR UR STRIP: 1.01 (ref 1–1.03)
UROBILINOGEN UR STRIP-ACNC: 0.2 EU/DL (ref 0.2–1)
WBC # BLD AUTO: 11.8 10E9/L (ref 4–11)

## 2017-12-26 PROCEDURE — 80048 BASIC METABOLIC PNL TOTAL CA: CPT | Mod: ZL | Performed by: NURSE PRACTITIONER

## 2017-12-26 PROCEDURE — 85025 COMPLETE CBC W/AUTO DIFF WBC: CPT | Mod: ZL | Performed by: NURSE PRACTITIONER

## 2017-12-26 PROCEDURE — 82306 VITAMIN D 25 HYDROXY: CPT | Mod: ZL | Performed by: NURSE PRACTITIONER

## 2017-12-26 PROCEDURE — 80076 HEPATIC FUNCTION PANEL: CPT | Mod: ZL | Performed by: NURSE PRACTITIONER

## 2017-12-26 PROCEDURE — 36416 COLLJ CAPILLARY BLOOD SPEC: CPT | Mod: ZL | Performed by: NURSE PRACTITIONER

## 2017-12-26 PROCEDURE — 99212 OFFICE O/P EST SF 10 MIN: CPT

## 2017-12-26 PROCEDURE — 40000788 ZZHCL STATISTIC ESTIMATED AVERAGE GLUCOSE: Mod: ZL | Performed by: NURSE PRACTITIONER

## 2017-12-26 PROCEDURE — 36415 COLL VENOUS BLD VENIPUNCTURE: CPT | Mod: ZL | Performed by: NURSE PRACTITIONER

## 2017-12-26 PROCEDURE — 81003 URINALYSIS AUTO W/O SCOPE: CPT | Mod: ZL | Performed by: NURSE PRACTITIONER

## 2017-12-26 PROCEDURE — 80061 LIPID PANEL: CPT | Mod: ZL | Performed by: NURSE PRACTITIONER

## 2017-12-26 PROCEDURE — 99000 SPECIMEN HANDLING OFFICE-LAB: CPT | Performed by: NURSE PRACTITIONER

## 2017-12-26 PROCEDURE — 83036 HEMOGLOBIN GLYCOSYLATED A1C: CPT | Mod: ZL | Performed by: NURSE PRACTITIONER

## 2017-12-26 PROCEDURE — 85610 PROTHROMBIN TIME: CPT | Mod: QW,ZL | Performed by: NURSE PRACTITIONER

## 2017-12-26 PROCEDURE — 99215 OFFICE O/P EST HI 40 MIN: CPT | Performed by: NURSE PRACTITIONER

## 2017-12-26 RX ORDER — ALBUTEROL SULFATE 0.83 MG/ML
SOLUTION RESPIRATORY (INHALATION)
Qty: 1125 ML | Refills: 1 | Status: SHIPPED | OUTPATIENT
Start: 2017-12-26 | End: 2018-04-16

## 2017-12-26 RX ORDER — TRAMADOL HYDROCHLORIDE 50 MG/1
TABLET ORAL
Qty: 60 TABLET | Refills: 5 | Status: SHIPPED | OUTPATIENT
Start: 2017-12-26 | End: 2018-06-18

## 2017-12-26 RX ORDER — LISINOPRIL 10 MG/1
10 TABLET ORAL DAILY
Qty: 90 TABLET | Refills: 1 | Status: SHIPPED | OUTPATIENT
Start: 2017-12-26 | End: 2018-07-05

## 2017-12-26 ASSESSMENT — PATIENT HEALTH QUESTIONNAIRE - PHQ9: SUM OF ALL RESPONSES TO PHQ QUESTIONS 1-9: 5

## 2017-12-26 ASSESSMENT — PAIN SCALES - GENERAL: PAINLEVEL: NO PAIN (0)

## 2017-12-26 NOTE — PROGRESS NOTES
SUBJECTIVE:   Frances Corea is a 57 year old female who presents to clinic today for the following health issues:      Hyperlipidemia Follow-Up    Rate your low fat/cholesterol diet?: good    Taking statin?  Yes, no muscle aches from statin    Other lipid medications/supplements?:  none      Hypertension Follow-up    Outpatient blood pressures are not being checked.    Low Salt Diet: no added salt      Depression Followup    Status since last visit: Stable     See PHQ-9 for current symptoms.  Other associated symptoms: None    Complicating factors:   Significant life event:  No   Current substance abuse:  None  Anxiety or Panic symptoms:  No    PHQ-9 Score and MyChart F/U Questions 12/30/2016 8/8/2017 12/26/2017   Total Score 0 8 5   Q9: Suicide Ideation Not at all Not at all Not at all     In the past two weeks have you had thoughts of suicide or self-harm?  No.    Do you have concerns about your personal safety or the safety of others?   No     Sees Delores Payan for mental health management, Schizophrenia - Bipolar I, Depression    PHQ-9  English  PHQ-9   Any Language  Suicide Assessment Five-step Evaluation and Treatment (SAFE-T)      Hypothyroidism Follow-up  Since last visit, patient describes the following symptoms: Weight stable, no hair loss, no skin changes, no constipation, no loose stools    Endocrine visit UTD, lab UTD      Vitamin D deficiency - D level due - on OTC D3      GERD - stable, no concerns      RADHA - on Bi-pap and home oxygen at night, 3L, NC      Chronic Pain Follow-Up  Type / Location of Pain: Knee pain, poor surgical candidate due to weight  Analgesia/pain control:       Recent changes:  same      Overall control: Tolerable with discomfort  Activity level/function:      Daily activities:  Able to do moderate activities    Work:  not applicable  Adverse effects:  No  Adherance    Taking medication as directed?  Yes    Participating in other treatments: yes  Risk Factors:    Sleep:   Good    Mood/anxiety:  controlled    Recent family or social stressors:  none noted    Other aggravating factors: none           Amount of exercise or physical activity: None    Problems taking medications regularly: No    Medication side effects: none      Diet: regular (no restrictions)      Problem list and histories reviewed & adjusted, as indicated.  Additional history: as documented    Patient Active Problem List   Diagnosis     Undifferentiated schizophrenia (H)     RADHA (obstructive sleep apnea)     Chronic anticoagulation     Hyperlipidemia with target LDL less than 100     Gastroesophageal reflux disease without esophagitis     Anxiety     Psoriasis     Bipolar 1 disorder (H)     ACP (advance care planning)     Benign essential hypertension     History of deep venous thrombosis     Morbid obesity (H)     Encounter for monitoring statin therapy     Aspirin contraindicated     Acquired hypothyroidism     BiPAP (biphasic positive airway pressure) dependence     On home oxygen therapy - 3L at hs     Vitamin D deficiency     Past Surgical History:   Procedure Laterality Date     GYN SURGERY      total hyst     HC ECP WITH CATARACT SURGERY      lt eye     [facility] OR OCULAR DEVICE INTRAOP DETACHED RETINA       HYSTERECTOMY       REPAIR LACERATION HAND  9/28/2013    Procedure: REPAIR LACERATION HAND;  repair left fifth finger laceration;  Surgeon: Miranda Xavier DO;  Location: [facility]       Social History   Substance Use Topics     Smoking status: Never Smoker     Smokeless tobacco: Never Used     Alcohol use No     Family History   Problem Relation Age of Onset     Ovarian Cancer Mother      CEREBROVASCULAR DISEASE Father          Current Outpatient Prescriptions   Medication Sig Dispense Refill     albuterol (2.5 MG/3ML) 0.083% neb solution NEBULIZE 1 VIAL EVERY 6 HOURS AS NEEDED FOR SHORTNESS OF BREATH 1125 mL 1     lisinopril (PRINIVIL/ZESTRIL) 10 MG tablet Take 1 tablet (10 mg) by mouth daily 90 tablet 1      traMADol (ULTRAM) 50 MG tablet TAKE 1 TO 2 TABLETS BY MOUTH EVERY 6 HOURS AS NEEDED FOR PAIN. MAX OF 6 TABLETS EVERY DAY 60 tablet 5     warfarin (COUMADIN) 3 MG tablet Take 6mg Mon/Wed/Fri; 9mg all other days or as directed by [facility] 221 tablet 1     CLOZAPINE PO (CLOZARIL) Take 25 mg by mouth every morning       lithium 300 MG tablet Take 300 mg by mouth Take 1tab every  am and 2pm, 2 tabs at HS       Levothyroxine Sodium (SYNTHROID PO) Take by mouth daily       mometasone (ELOCON) 0.1 % cream Apply sparingly to affected area twice daily as needed.  Do not apply to face. 45 g 0     simvastatin (ZOCOR) 20 MG tablet Take 1 tablet (20 mg) by mouth At Bedtime 90 tablet 3     ASPIRIN NOT PRESCRIBED (INTENTIONAL) Please choose reason not prescribed, below 0 each 0     triamcinolone (KENALOG) 0.1 % ointment APPLY TOPICALLY TWICE DAILY TO BACK OF NECK AND HAIRLINE 80 g 1     order for DME Equipment being ordered: neb machine 1 Device 0     order for DME Equipment being ordered: Bipap and supplies - states it has been 5 years 1 Device 0     clobetasol (TEMOVATE) 0.05 % ointment APPLY TOPICALLY TWICE DAILY ON THE BACK OF THE NECK AND HAIRLINE 60 g 0     METHIMAZOLE PO Take 5 mg by mouth daily 2 pills daily        ASPIRIN PO Take 81 mg by mouth daily       LORAZEPAM PO Take 0.5 mg by mouth 1-2 twice daily as needed       CLOZAPINE PO (CLOZARIL) Take 50 mg by mouth At Bedtime        sennosides (SENOKOT) 8.6 MG tablet Take 2 tablets by mouth daily as needed for constipation 120 tablet 1     order for DME Wheeled walker 1 Device 0     MAGNESIUM OXIDE PO Take 400 mg by mouth daily        Multiple Vitamins-Minerals (MULTIVITAMIN & MINERAL PO) Take by mouth daily        Probiotic Product (PROBIOTIC DAILY PO) Take by mouth daily        Calcium Carbonate-Vitamin D (CALCIUM + D PO) Take by mouth daily        Lutein 20 MG CAPS Take by mouth daily        Acetaminophen (TYLENOL PO) Take 500 mg by mouth       MELATONIN PO  Take 3 mg by mouth At Bedtime Pt takes at 8 PM       latanoprost (XALATAN) 0.005 % ophthalmic solution Place 1 drop into both eyes At Bedtime       Lansoprazole (PREVACID PO) Take 15 mg by mouth every morning (before breakfast)        glucosamine-chondroitin 500-400 MG CAPS Take 1 capsule by mouth daily       [DISCONTINUED] lisinopril (PRINIVIL/ZESTRIL) 10 MG tablet Take 1 tablet (10 mg) by mouth daily 90 tablet 1     [DISCONTINUED] albuterol (2.5 MG/3ML) 0.083% neb solution NEBULIZE 1 VIAL EVERY 6 HOURS AS NEEDED FOR SHORTNESS OF BREATH 1125 mL 1     Allergies   Allergen Reactions     Depakote      Increased lfts and ammonia     Zyprexa Other (See Comments)     Increased lft and increased ammonia.     Adhesive Tape Rash     Hunteraquin      Recent Labs   Lab Test  08/25/17   1420  08/08/17   1534  01/24/17   1525   10/14/16   0448   09/26/16   1330   A1C  5.0   --   4.8   --    --    --   5.2   LDL  112*   --   86   --    --    --   80   HDL  54   --   61   --    --    --   47*   TRIG  128   --   115   --    --    --   121   ALT  27   --   31   --   26   < >  27   CR  0.53  0.55  0.61   --   0.52   < >  0.48*   GFRESTIMATED  >90  >90  Non African American GFR Calc    >90  Non  GFR Calc     --   >90  Non  GFR Calc     < >  >90  Non  GFR Calc     GFRESTBLACK  >90  >90  African American GFR Calc    >90   GFR Calc     --   >90   GFR Calc     < >  >90   GFR Calc     POTASSIUM  4.1  4.1  4.1   --   4.0   < >  4.1   TSH  2.20   --   6.63*   < >   --    < >  <0.01*    < > = values in this interval not displayed.      BP Readings from Last 3 Encounters:   12/26/17 110/68   08/08/17 114/74   04/21/17 122/78    Wt Readings from Last 3 Encounters:   12/26/17 296 lb (134.3 kg)   08/08/17 293 lb (132.9 kg)   04/21/17 296 lb (134.3 kg)                  Labs reviewed in EPIC          Reviewed and updated as needed this visit by clinical  "staffTobacco  Allergies  Meds  Med Hx  Surg Hx  Fam Hx  Soc Hx      Reviewed and updated as needed this visit by Provider         ROS:  C: NEGATIVE for fever, chills, change in weight  I: NEGATIVE for worrisome rashes, moles or lesions  E: NEGATIVE for vision changes or irritation  E/M: NEGATIVE for ear, mouth and throat problems  R: NEGATIVE for significant cough or SOB  B: NEGATIVE for masses, tenderness or discharge  CV: NEGATIVE for chest pain, palpitations or peripheral edema  GI: NEGATIVE for nausea, abdominal pain, heartburn, or change in bowel habits  : NEGATIVE for frequency, dysuria, or hematuria  MUSCULOSKELETAL:as qbove  N: NEGATIVE for weakness, dizziness or paresthesias  E: NEGATIVE for temperature intolerance, skin/hair changes  H: NEGATIVE for bleeding problems  P: NEGATIVE for changes in mood or affect    OBJECTIVE:     /68 (BP Location: Right arm, Patient Position: Chair, Cuff Size: Adult Large)  Pulse 94  Temp 98.4  F (36.9  C) (Tympanic)  Ht 5' 10\" (1.778 m)  Wt 296 lb (134.3 kg)  SpO2 94%  BMI 42.47 kg/m2  Body mass index is 42.47 kg/(m^2).       GENERAL: healthy, alert and no distress  EYES: Eyes grossly normal to inspection, PERRL and conjunctivae and sclerae normal  HENT: ear canals and TM's normal, nose and mouth without ulcers or lesions  NECK: no adenopathy, no asymmetry, masses, or scars and thyroid normal to palpation  RESP: lungs clear to auscultation - no rales, rhonchi or wheezes  CV: regular rate and rhythm, normal S1 S2, no S3 or S4, no murmur, click or rub, no peripheral edema and peripheral pulses strong  ABDOMEN: soft, nontender, no hepatosplenomegaly, no masses and bowel sounds normal  MS: no gross musculoskeletal defects noted, no edema  SKIN: no suspicious lesions or rashes  BACK: no CVA tenderness, no paralumbar tenderness  PSYCH: mentation appears normal, affect normal/bright        Visit time:   Over 45 minutes are spent with the patient, over 50% of " which was in education and counseling regarding current conditions and treatment/therapy.  Time spent with patient, including examination, face to face patient education, review of disease process, and plan of care   -25  mn  Visit Content: During our face to face time, patient education was provided regarding diagnosis, and treatment pan.  Relevant laboratory and diagnostic testing is reviewed prior to visit, and updated at this appointment as indicated  Health Maintenance - updated as appropriate.  Record review completed      ASSESSMENT/PLAN:     Hyperlipidemia; controlled   Plan:  No changes in the patient's current treatment plan    Hypertension; controlled   Associated with the following complications:    None   Plan:  No changes in the patient's current treatment plan    Depression; recurrent episode-- Moderate   Associated with the following complications:    None   Plan:  No changes in the patient's current treatment plan    Hypothyroidism; controlled/euthyroid   Plan:  No changes in the patient's current treatment plan        1. Benign essential hypertension  - Basic metabolic panel  - UA reflex to Microscopic  - lisinopril (PRINIVIL/ZESTRIL) 10 MG tablet; Take 1 tablet (10 mg) by mouth daily  Dispense: 90 tablet; Refill: 1    2. Hyperlipidemia with target LDL less than 100  - Zocor as directed  - Fish oil 3 per day  - Low fat diet  - Lipid Profile    3. Encounter for monitoring statin therapy  - Hepatic panel    4. Acquired hypothyroidism  - Continue plan of care  - TSH UTD with endocrinologist    5. History of deep venous thrombosis  - Continue plan of care  - Management by anticoagulation clinic    6. Chronic anticoagulation  - INR    7. Gastroesophageal reflux disease without esophagitis  - Conitnue plan of care    8. Encounter for therapeutic drug monitoring - Clozaril  - CBC with platelets differential    9. Elevated glucose  - Hemoglobin A1c    10. Moderate episode of recurrent major depressive  disorder (H)  - Continue plan of care, follow up with Delores Payan as scheduled  - DEPRESSION EDUCATION    11. Vitamin D deficiency  - Vitamin D3 OTC 5000 U  - Vitamin D Dlevel    12. SOB (shortness of breath)  - albuterol (2.5 MG/3ML) 0.083% neb solution; NEBULIZE 1 VIAL EVERY 6 HOURS AS NEEDED FOR SHORTNESS OF BREATH  Dispense: 1125 mL; Refill: 1    13. Left knee pain, unspecified chronicity  - traMADol (ULTRAM) 50 MG tablet; TAKE 1 TO 2 TABLETS BY MOUTH EVERY 6 HOURS AS NEEDED FOR PAIN. MAX OF 6 TABLETS EVERY DAY  Dispense: 60 tablet; Refill: 5  - Use walker as needed      I will copy your labs to Delores Payan      Next visit 3 months, am fasting      Ayah Rojas NP  [facility]

## 2017-12-26 NOTE — NURSING NOTE
"Chief Complaint   Patient presents with     RECHECK       Initial /68 (BP Location: Right arm, Patient Position: Chair, Cuff Size: Adult Large)  Pulse 94  Temp 98.4  F (36.9  C) (Tympanic)  Ht 5' 10\" (1.778 m)  Wt 296 lb (134.3 kg)  SpO2 94%  BMI 42.47 kg/m2 Estimated body mass index is 42.47 kg/(m^2) as calculated from the following:    Height as of this encounter: 5' 10\" (1.778 m).    Weight as of this encounter: 296 lb (134.3 kg).  Medication Reconciliation: complete   Muriel Carmona    "

## 2017-12-26 NOTE — MR AVS SNAPSHOT
Frances Corea   12/26/2017   Anticoagulation Therapy Visit    Description:  57 year old female   Provider:  Ayah Rojas NP   Department:  [facility]           INR as of 12/26/2017     Today's INR 3.1!      Anticoagulation Summary as of 12/26/2017     INR goal 2.0-3.0   Today's INR 3.1!   Full instructions 6 mg on Mon, Wed, Fri; 9 mg all other days   Next INR check 1/16/2018    Indications   History of deep venous thrombosis [Z86.718]  History of pulmonary embolism (Resolved) [Z86.711]  Long-term (current) use of anticoagulants [Z79.01] (Resolved) [Z79.01]         December 2017 Details    Sun Mon Tue Wed Thu Fri Sat          1               2                 3               4               5               6               7               8               9                 10               11               12               13               14               15               16                 17               18               19               20               21               22               23                 24               25               26      9 mg   See details      27      6 mg         28      9 mg         29      6 mg         30      9 mg           31      9 mg                Date Details   12/26 This INR check               How to take your warfarin dose     To take:  6 mg Take 2 of the 3 mg tablets.    To take:  9 mg Take 3 of the 3 mg tablets.           January 2018 Details    Sun Mon Tue Wed Thu Fri Sat      1      6 mg         2      9 mg         3      6 mg         4      9 mg         5      6 mg         6      9 mg           7      9 mg         8      6 mg         9      9 mg         10      6 mg         11      9 mg         12      6 mg         13      9 mg           14      9 mg         15      6 mg         16            17               18               19               20                 21               22               23               24               25               26                27                 28               29               30               31                   Date Details   No additional details    Date of next INR:  1/16/2018         How to take your warfarin dose     To take:  6 mg Take 2 of the 3 mg tablets.    To take:  9 mg Take 3 of the 3 mg tablets.

## 2017-12-26 NOTE — PATIENT INSTRUCTIONS
1. Benign essential hypertension  - Basic metabolic panel  - UA reflex to Microscopic  - lisinopril (PRINIVIL/ZESTRIL) 10 MG tablet; Take 1 tablet (10 mg) by mouth daily  Dispense: 90 tablet; Refill: 1    2. Hyperlipidemia with target LDL less than 100  - Zocor as directed  - Fish oil 3 per day  - Low fat diet  - Lipid Profile    3. Encounter for monitoring statin therapy  - Hepatic panel    4. Acquired hypothyroidism  - Continue plan of care  - TSH UTD with endocrinologist    5. History of deep venous thrombosis  - Continue plan of care  - Management by anticoagulation clinic    6. Chronic anticoagulation  - INR    7. Gastroesophageal reflux disease without esophagitis  - Conitnue plan of care    8. Encounter for therapeutic drug monitoring - Clozaril  - CBC with platelets differential    9. Elevated glucose  - Hemoglobin A1c    10. Moderate episode of recurrent major depressive disorder (H)  - Continue plan of care, follow up with Delores Payan as scheduled  - DEPRESSION EDUCATION    11. Vitamin D deficiency  - Vitamin D3 OTC 5000 U  - Vitamin D Dlevel    12. SOB (shortness of breath)  - albuterol (2.5 MG/3ML) 0.083% neb solution; NEBULIZE 1 VIAL EVERY 6 HOURS AS NEEDED FOR SHORTNESS OF BREATH  Dispense: 1125 mL; Refill: 1    13. Left knee pain, unspecified chronicity  - traMADol (ULTRAM) 50 MG tablet; TAKE 1 TO 2 TABLETS BY MOUTH EVERY 6 HOURS AS NEEDED FOR PAIN. MAX OF 6 TABLETS EVERY DAY  Dispense: 60 tablet; Refill: 5  - Use walker as needed      I will copy your labs to Delores Payan      Next visit 3 months, am fasting      Ayah Rojas NP  [facility]

## 2017-12-26 NOTE — MR AVS SNAPSHOT
After Visit Summary   12/26/2017    Frances Corea    MRN: [id number]           Patient Information     Date Of Birth          1960        Visit Information        Provider Department      12/26/2017 1:45 PM Ayah Rojas NP [facility]        Today's Diagnoses     Benign essential hypertension    -  1    Hyperlipidemia with target LDL less than 100        Encounter for monitoring statin therapy        Acquired hypothyroidism        History of deep venous thrombosis        Chronic anticoagulation        Gastroesophageal reflux disease without esophagitis        Encounter for therapeutic drug monitoring        Elevated glucose        Moderate episode of recurrent major depressive disorder (H)        Vitamin D deficiency        SOB (shortness of breath)        Left knee pain, unspecified chronicity          Care Instructions        1. Benign essential hypertension  - Basic metabolic panel  - UA reflex to Microscopic  - lisinopril (PRINIVIL/ZESTRIL) 10 MG tablet; Take 1 tablet (10 mg) by mouth daily  Dispense: 90 tablet; Refill: 1    2. Hyperlipidemia with target LDL less than 100  - Zocor as directed  - Fish oil 3 per day  - Low fat diet  - Lipid Profile    3. Encounter for monitoring statin therapy  - Hepatic panel    4. Acquired hypothyroidism  - Continue plan of care  - TSH UTD with endocrinologist    5. History of deep venous thrombosis  - Continue plan of care  - Management by anticoagulation clinic    6. Chronic anticoagulation  - INR    7. Gastroesophageal reflux disease without esophagitis  - Conitnue plan of care    8. Encounter for therapeutic drug monitoring - Clozaril  - CBC with platelets differential    9. Elevated glucose  - Hemoglobin A1c    10. Moderate episode of recurrent major depressive disorder (H)  - Continue plan of care, follow up with Delores Payan as scheduled  - DEPRESSION EDUCATION    11. Vitamin D deficiency  - Vitamin D3 OTC 5000 U  - Vitamin D Dlevel    12.  "SOB (shortness of breath)  - albuterol (2.5 MG/3ML) 0.083% neb solution; NEBULIZE 1 VIAL EVERY 6 HOURS AS NEEDED FOR SHORTNESS OF BREATH  Dispense: 1125 mL; Refill: 1    13. Left knee pain, unspecified chronicity  - traMADol (ULTRAM) 50 MG tablet; TAKE 1 TO 2 TABLETS BY MOUTH EVERY 6 HOURS AS NEEDED FOR PAIN. MAX OF 6 TABLETS EVERY DAY  Dispense: 60 tablet; Refill: 5  - Use walker as needed      I will copy your labs to Delores Payan      Next visit 3 months, am fasting      Ayah Rojas NP  [facility]            Follow-ups after your visit        Who to contact     If you have questions or need follow up information about today's clinic visit or your schedule please contact [facility] directly at [phone].  Normal or non-critical lab and imaging results will be communicated to you by MyChart, letter or phone within 4 business days after the clinic has received the results. If you do not hear from us within 7 days, please contact the clinic through [employer] or phone. If you have a critical or abnormal lab result, we will notify you by phone as soon as possible.  Submit refill requests through [employer] or call your pharmacy and they will forward the refill request to us. Please allow 3 business days for your refill to be completed.          Additional Information About Your Visit        MyCharLogicLoop Information     [employer] lets you send messages to your doctor, view your test results, renew your prescriptions, schedule appointments and more. To sign up, go to [email or web address] . Click on \"Log in\" on the left side of the screen, which will take you to the Welcome page. Then click on \"Sign up Now\" on the right side of the page.     You will be asked to enter the access code listed below, as well as some personal information. Please follow the directions to create your username and password.     Your access code is: [email or web address]  Expires: 3/26/2018  2:58 PM     Your access code will  in " "90 days. If you need help or a new code, please call your [facility] clinic or [phone].        Care EveryWhere ID     This is your Care EveryWhere ID. This could be used by other organizations to access your [facility] medical records  [id number]        Your Vitals Were     Pulse Temperature Height Pulse Oximetry BMI (Body Mass Index)       94 98.4  F (36.9  C) (Tympanic) 5' 10\" (1.778 m) 94% 42.47 kg/m2        Blood Pressure from Last 3 Encounters:   12/26/17 110/68   08/08/17 114/74   04/21/17 122/78    Weight from Last 3 Encounters:   12/26/17 296 lb (134.3 kg)   08/08/17 293 lb (132.9 kg)   04/21/17 296 lb (134.3 kg)              We Performed the Following     *UA reflex to Microscopic     Basic metabolic panel     CBC with platelets differential     DEPRESSION EDUCATION     Hemoglobin A1c     Hepatic panel     INR     Lipid Profile     Vitamin D Deficiency          Today's Medication Changes          These changes are accurate as of: 12/26/17  2:58 PM.  If you have any questions, ask your nurse or doctor.               These medicines have changed or have updated prescriptions.        Dose/Directions    lithium 300 MG tablet   This may have changed:  Another medication with the same name was removed. Continue taking this medication, and follow the directions you see here.   Changed by:  Ayah Rojas NP        Dose:  300 mg   Take 300 mg by mouth Take 1tab every  am and 2pm, 2 tabs at HS   Refills:  0       traMADol 50 MG tablet   Commonly known as:  ULTRAM   This may have changed:  See the new instructions.   Used for:  Left knee pain, unspecified chronicity   Changed by:  Ayah Rojas NP        TAKE 1 TO 2 TABLETS BY MOUTH EVERY 6 HOURS AS NEEDED FOR PAIN. MAX OF 6 TABLETS EVERY DAY   Quantity:  60 tablet   Refills:  5            Where to get your medicines      These medications were sent to [facility] [address] [place], [place] [address] [facility] [address], " [place] [address]     Phone:  [phone]     albuterol (2.5 MG/3ML) 0.083% neb solution    lisinopril 10 MG tablet         Some of these will need a paper prescription and others can be bought over the counter.  Ask your nurse if you have questions.     Bring a paper prescription for each of these medications     traMADol 50 MG tablet                Primary Care Provider Office Phone # Fax #    Ayah Rojas [phone] [address] [place] [address]        Equal Access to Services     [facility]: Hadii aad ku hadasho Soomaali, waaxda luqadaha, qaybta kaalmada adeegyada, radha lawson . So [facility] [phone].    ATENCIÓN: Si habla español, tiene a salter disposición servicios gratuitos de asistencia lingüística. [facility] [phone].    We comply with applicable federal civil rights laws and [place] laws. We do not discriminate on the basis of race, color, national origin, age, disability, sex, sexual orientation, or gender identity.            Thank you!     Thank you for choosing [facility]  for your care. Our goal is always to provide you with excellent care. Hearing back from our patients is one way we can continue to improve our services. Please take a few minutes to complete the written survey that you may receive in the mail after your visit with us. Thank you!             Your Updated Medication List - Protect others around you: Learn how to safely use, store and throw away your medicines at www.disposemymeds.org.          This list is accurate as of: 12/26/17  2:58 PM.  Always use your most recent med list.                   Brand Name Dispense Instructions for use Diagnosis    albuterol (2.5 MG/3ML) 0.083% neb solution     1125 mL    NEBULIZE 1 VIAL EVERY 6 HOURS AS NEEDED FOR SHORTNESS OF BREATH    SOB (shortness of breath)       ASPIRIN NOT PRESCRIBED    INTENTIONAL    0 each    Please choose reason not prescribed,  below    Benign essential hypertension       ASPIRIN PO      Take 81 mg by mouth daily        CALCIUM + D PO      Take by mouth daily        clobetasol 0.05 % ointment    TEMOVATE    60 g    APPLY TOPICALLY TWICE DAILY ON THE BACK OF THE NECK AND HAIRLINE    Atopic dermatitis, unspecified type       * CLOZAPINE PO    CLOZARIL     Take 50 mg by mouth At Bedtime        * CLOZAPINE PO    CLOZARIL     Take 25 mg by mouth every morning        glucosamine-chondroitin 500-400 MG Caps per capsule      Take 1 capsule by mouth daily        latanoprost 0.005 % ophthalmic solution    XALATAN     Place 1 drop into both eyes At Bedtime        lisinopril 10 MG tablet    PRINIVIL/ZESTRIL    90 tablet    Take 1 tablet (10 mg) by mouth daily    Benign essential hypertension       lithium 300 MG tablet      Take 300 mg by mouth Take 1tab every  am and 2pm, 2 tabs at HS        LORAZEPAM PO      Take 0.5 mg by mouth 1-2 twice daily as needed        Lutein 20 MG Caps      Take by mouth daily        MAGNESIUM OXIDE PO      Take 400 mg by mouth daily        MELATONIN PO      Take 3 mg by mouth At Bedtime Pt takes at 8 PM        METHIMAZOLE PO      Take 5 mg by mouth daily 2 pills daily        mometasone 0.1 % cream    ELOCON    45 g    Apply sparingly to affected area twice daily as needed.  Do not apply to face.    Atopic dermatitis, unspecified type       MULTIVITAMIN & MINERAL PO      Take by mouth daily        * order for DME     1 Device    Wheeled walker    Altered gait       * order for DME     1 Device    Equipment being ordered: neb machine    History of respiratory failure       * order for DME     1 Device    Equipment being ordered: [employer] supplies - states it has been 5 years    RADHA (obstructive sleep apnea)       PREVACID PO      Take 15 mg by mouth every morning (before breakfast)        PROBIOTIC DAILY PO      Take by mouth daily        sennosides 8.6 MG tablet    SENOKOT    120 tablet    Take 2 tablets by mouth daily as  needed for constipation    Constipation, unspecified constipation type       simvastatin 20 MG tablet    ZOCOR    90 tablet    Take 1 tablet (20 mg) by mouth At Bedtime    History of pulmonary embolism       SYNTHROID PO      Take by mouth daily        traMADol 50 MG tablet    ULTRAM    60 tablet    TAKE 1 TO 2 TABLETS BY MOUTH EVERY 6 HOURS AS NEEDED FOR PAIN. MAX OF 6 TABLETS EVERY DAY    Left knee pain, unspecified chronicity       triamcinolone 0.1 % ointment    KENALOG    80 g    APPLY TOPICALLY TWICE DAILY TO BACK OF NECK AND HAIRLINE    Atopic dermatitis, unspecified type       TYLENOL PO      Take 500 mg by mouth        warfarin 3 MG tablet    COUMADIN    221 tablet    Take 6mg Mon/Wed/Fri; 9mg all other days or as directed by Counts include 234 beds at the [facility]    Long-term (current) use of anticoagulants, Chronic deep vein thrombosis (DVT) of left lower extremity (H)       * Notice:  This list has 5 medication(s) that are the same as other medications prescribed for you. Read the directions carefully, and ask your doctor or other care provider to review them with you.

## 2017-12-26 NOTE — PROGRESS NOTES
ANTICOAGULATION FOLLOW-UP CLINIC VISIT    Patient Name:  Frances Corea  Date:  12/26/2017  Contact Type:  Telephone/ message left on patient  voicemail as I was unable to contact patient mother as there is an issues with the phones    SUBJECTIVE:     Patient Findings     Positives Other complaints    Comments Call placed to patient   And message left re: INR result, warfarin dosing and INR recheck date as I have not been able to reach patient mother as there is some issue with the phones. I requested that patient  please relay the message to patient mother. I also requested that he ask her to have patient increase vit K intake. If there are any questions as far as warfarin dosing and INR recheck date to please have patient mother call the warfarin clinic either today or tomorrow.            OBJECTIVE    INR Point of Care   Date Value Ref Range Status   12/26/2017 3.1 (H) 0.86 - 1.14 Final     Comment:     This test is intended for monitoring Coumadin therapy.  Results are not   accurate in patients with prolonged INR due to factor deficiency.         ASSESSMENT / PLAN  INR assessment THER    Recheck INR In: 3 WEEKS    INR Location Clinic      Anticoagulation Summary as of 12/26/2017     INR goal 2.0-3.0   Today's INR 3.1!   Maintenance plan 6 mg (3 mg x 2) on Mon, Wed, Fri; 9 mg (3 mg x 3) all other days   Full instructions 6 mg on Mon, Wed, Fri; 9 mg all other days   Weekly total 54 mg   Plan last modified Stefany Huynh RN (12/26/2017)   Next INR check 1/16/2018   Target end date     Indications   History of deep venous thrombosis [Z86.718]  History of pulmonary embolism (Resolved) [Z86.711]  Long-term (current) use of anticoagulants [Z79.01] (Resolved) [Z79.01]         Anticoagulation Episode Summary     INR check location     Preferred lab     Send INR reminders to  ANTICOAG POOL    Comments       Anticoagulation Care Providers     Provider Role Specialty Phone number    Ayah Rojas  NP [facility] [phone]            See the Encounter Report to view Anticoagulation Flowsheet and Dosing Calendar (Go to Encounters tab in chart review, and find the Anticoagulation Therapy Visit)        Stefany Huynh RN

## 2017-12-26 NOTE — LETTER
My Depression Action Plan  Name: Frances Corea   Date of Birth 1960  Date: 12/26/2017    My doctor: Ayah Rojas   My clinic: [facility]  [address] [place] [address] [address]  [place] [address]  [phone]          GREEN    ZONE   Good Control    What it looks like:     Things are going generally well. You have normal up s and down s. You may even feel depressed from time to time, but bad moods usually last less than a day.   What you need to do:  1. Continue to care for yourself (see self care plan)  2. Check your depression survival kit and update it as needed  3. Follow your physician s recommendations including any medication.  4. Do not stop taking medication unless you consult with your physician first.           YELLOW         ZONE Getting Worse    What it looks like:     Depression is starting to interfere with your life.     It may be hard to get out of bed; you may be starting to isolate yourself from others.    Symptoms of depression are starting to last most all day and this has happened for several days.     You may have suicidal thoughts but they are not constant.   What you need to do:     1. Call your care team, your response to treatment will improve if you keep your care team informed of your progress. Yellow periods are signs an adjustment may need to be made.     2. Continue your self-care, even if you have to fake it!    3. Talk to someone in your support network    4. Open up your depression survival kit           RED    ZONE Medical Alert - Get Help    What it looks like:     Depression is seriously interfering with your life.     You may experience these or other symptoms: You can t get out of bed most days, can t work or engage in other necessary activities, you have trouble taking care of basic hygiene, or basic responsibilities, thoughts of suicide or death that will not go away, self-injurious behavior.     What you need to do:  1. Call your care team and  request a same-day appointment. If they are not available (weekends or after hours) call your local crisis line, emergency room or 911.      Electronically signed by: Ayah Rojas, December 26, 2017    Depression Self Care Plan / Survival Kit    Self-Care for Depression  Here s the deal. Your body and mind are really not as separate as most people think.  What you do and think affects how you feel and how you feel influences what you do and think. This means if you do things that people who feel good do, it will help you feel better.  Sometimes this is all it takes.  There is also a place for medication and therapy depending on how severe your depression is, so be sure to consult with your medical provider and/ or Behavioral Health Consultant if your symptoms are worsening or not improving.     In order to better manage my stress, I will:    Exercise  Get some form of exercise, every day. This will help reduce pain and release endorphins, the  feel good  chemicals in your brain. This is almost as good as taking antidepressants!  This is not the same as joining a gym and then never going! (they count on that by the way ) It can be as simple as just going for a walk or doing some gardening, anything that will get you moving.      Hygiene   Maintain good hygiene (Get out of bed in the morning, Make your bed, Brush your teeth, Take a shower, and Get dressed like you were going to work, even if you are unemployed).  If your clothes don't fit try to get ones that do.    Diet  I will strive to eat foods that are good for me, drink plenty of water, and avoid excessive sugar, caffeine, alcohol, and other mood-altering substances.  Some foods that are helpful in depression are: complex carbohydrates, B vitamins, flaxseed, fish or fish oil, fresh fruits and vegetables.    Psychotherapy  I agree to participate in Individual Therapy (if recommended).    Medication  If prescribed medications, I agree to take them.  Missing doses  can result in serious side effects.  I understand that drinking alcohol, or other illicit drug use, may cause potential side effects.  I will not stop my medication abruptly without first discussing it with my provider.    Staying Connected With Others  I will stay in touch with my friends, family members, and my primary care provider/team.    Use your imagination  Be creative.  We all have a creative side; it doesn t matter if it s oil painting, sand castles, or mud pies! This will also kick up the endorphins.    Witness Beauty  (AKA stop and smell the roses) Take a look outside, even in mid-winter. Notice colors, textures. Watch the squirrels and birds.     Service to others  Be of service to others.  There is always someone else in need.  By helping others we can  get out of ourselves  and remember the really important things.  This also provides opportunities for practicing all the other parts of the program.    Humor  Laugh and be silly!  Adjust your TV habits for less news and crime-drama and more comedy.    Control your stress  Try breathing deep, massage therapy, biofeedback, and meditation. Find time to relax each day.     My support system    Clinic Contact:  Phone number:    Contact 1:  Phone number:    Contact 2:  Phone number:    Anabaptism/:  Phone number:    Therapist:  Phone number:    Local crisis center:    Phone number:    Other community support:  Phone number:

## 2017-12-27 LAB
ALBUMIN SERPL-MCNC: 3.8 G/DL (ref 3.4–5)
ALP SERPL-CCNC: 114 U/L (ref 40–150)
ALT SERPL W P-5'-P-CCNC: 26 U/L (ref 0–50)
ANION GAP SERPL CALCULATED.3IONS-SCNC: 6 MMOL/L (ref 3–14)
AST SERPL W P-5'-P-CCNC: 14 U/L (ref 0–45)
BILIRUB DIRECT SERPL-MCNC: 0.1 MG/DL (ref 0–0.2)
BILIRUB SERPL-MCNC: 0.7 MG/DL (ref 0.2–1.3)
BUN SERPL-MCNC: 13 MG/DL (ref 7–30)
CALCIUM SERPL-MCNC: 9.9 MG/DL (ref 8.5–10.1)
CHLORIDE SERPL-SCNC: 106 MMOL/L (ref 94–109)
CHOLEST SERPL-MCNC: 213 MG/DL
CO2 SERPL-SCNC: 28 MMOL/L (ref 20–32)
CREAT SERPL-MCNC: 0.56 MG/DL (ref 0.52–1.04)
EST. AVERAGE GLUCOSE BLD GHB EST-MCNC: 94 MG/DL
GFR SERPL CREATININE-BSD FRML MDRD: >90 ML/MIN/1.7M2
GLUCOSE SERPL-MCNC: 105 MG/DL (ref 70–99)
HBA1C MFR BLD: 4.9 % (ref 4.3–6)
HDLC SERPL-MCNC: 65 MG/DL
LDLC SERPL CALC-MCNC: 117 MG/DL
NONHDLC SERPL-MCNC: 148 MG/DL
POTASSIUM SERPL-SCNC: 4.5 MMOL/L (ref 3.4–5.3)
PROT SERPL-MCNC: 7.7 G/DL (ref 6.8–8.8)
SODIUM SERPL-SCNC: 140 MMOL/L (ref 133–144)
TRIGL SERPL-MCNC: 153 MG/DL

## 2017-12-27 NOTE — PROGRESS NOTES
Work on diet - recheck lipids 3 mos  Other labs look good!  INR goes to coumadin clinic    D pending    [facility]  [phone]

## 2017-12-28 LAB — DEPRECATED CALCIDIOL+CALCIFEROL SERPL-MC: 41 UG/L (ref 20–75)

## 2018-01-16 ENCOUNTER — ANTICOAGULATION THERAPY VISIT (OUTPATIENT)
Dept: ANTICOAGULATION | Facility: OTHER | Age: 58
End: 2018-01-16
Payer: COMMERCIAL

## 2018-01-16 DIAGNOSIS — Z86.718 HISTORY OF DEEP VENOUS THROMBOSIS: ICD-10-CM

## 2018-01-16 DIAGNOSIS — Z79.01 LONG TERM CURRENT USE OF ANTICOAGULANT THERAPY: ICD-10-CM

## 2018-01-16 DIAGNOSIS — I26.99 PULMONARY EMBOLISM AND INFARCTION (H): ICD-10-CM

## 2018-01-16 LAB — INR BLD: 1.8 (ref 0.86–1.14)

## 2018-01-16 PROCEDURE — 36416 COLLJ CAPILLARY BLOOD SPEC: CPT | Mod: ZL | Performed by: NURSE PRACTITIONER

## 2018-01-16 PROCEDURE — 85610 PROTHROMBIN TIME: CPT | Mod: QW,ZL | Performed by: NURSE PRACTITIONER

## 2018-01-16 NOTE — MR AVS SNAPSHOT
Frances Corea   1/16/2018   Anticoagulation Therapy Visit    Description:  57 year old female   Provider:  Ayah Rojas NP   Department:  [facility] Anti Coagulation           INR as of 1/16/2018     Today's INR 1.8!      Anticoagulation Summary as of 1/16/2018     INR goal 2.0-3.0   Today's INR 1.8!   Full instructions 1/16: 10.5 mg; Otherwise 6 mg on Mon, Wed, Fri; 9 mg all other days   Next INR check 2/6/2018    Indications   History of deep venous thrombosis [Z86.718]  History of pulmonary embolism (Resolved) [Z86.711]  Long-term (current) use of anticoagulants [Z79.01] (Resolved) [Z79.01]         January 2018 Details    Sun Mon Tue Wed Thu Fri Sat      1               2               3               4               5               6                 7               8               9               10               11               12               13                 14               15               16      10.5 mg   See details      17      6 mg         18      9 mg         19      6 mg         20      9 mg           21      9 mg         22      6 mg         23      9 mg         24      6 mg         25      9 mg         26      6 mg         27      9 mg           28      9 mg         29      6 mg         30      9 mg         31      6 mg             Date Details   01/16 This INR check               How to take your warfarin dose     To take:  6 mg Take 2 of the 3 mg tablets.    To take:  9 mg Take 3 of the 3 mg tablets.    To take:  10.5 mg Take 3.5 of the 3 mg tablets.           February 2018 Details    Sun Mon Tue Wed Thu Fri Sat         1      9 mg         2      6 mg         3      9 mg           4      9 mg         5      6 mg         6            7               8               9               10                 11               12               13               14               15               16               17                 18               19               20               21               22                23               24                 25               26               27               28                   Date Details   No additional details    Date of next INR:  2/6/2018         How to take your warfarin dose     To take:  6 mg Take 2 of the 3 mg tablets.    To take:  9 mg Take 3 of the 3 mg tablets.

## 2018-01-16 NOTE — PROGRESS NOTES
ANTICOAGULATION FOLLOW-UP CLINIC VISIT    Patient Name:  Frances Corea  Date:  1/16/2018  Contact Type:  Telephone/ message left on mother's home phone voicemail    SUBJECTIVE:     Patient Findings     Positives No Problem Findings    Comments INR done by lab and noted by warfarin clinic. Call placed to patient mother, Frances (as previously instructed to do), and message left re: INR result, warfarin dosing and iNR recheck date. Patient mother to notify warfarin clinic if patient has any bleeding/bruising, changes in diet/meds/activity or questions.            OBJECTIVE    INR Point of Care   Date Value Ref Range Status   01/16/2018 1.8 (H) 0.86 - 1.14 Final     Comment:     This test is intended for monitoring Coumadin therapy.  Results are not   accurate in patients with prolonged INR due to factor deficiency.         ASSESSMENT / PLAN  INR assessment SUB    Recheck INR In: 3 WEEKS    INR Location Clinic      Anticoagulation Summary as of 1/16/2018     INR goal 2.0-3.0   Today's INR 1.8!   Maintenance plan 6 mg (3 mg x 2) on Mon, Wed, Fri; 9 mg (3 mg x 3) all other days   Full instructions 1/16: 10.5 mg; Otherwise 6 mg on Mon, Wed, Fri; 9 mg all other days   Weekly total 54 mg   Plan last modified Stefany Huynh RN (12/26/2017)   Next INR check 2/6/2018   Target end date     Indications   History of deep venous thrombosis [Z86.718]  History of pulmonary embolism (Resolved) [Z86.711]  Long-term (current) use of anticoagulants [Z79.01] (Resolved) [Z79.01]         Anticoagulation Episode Summary     INR check location     Preferred lab     Send INR reminders to  ANTICOAG POOL    Comments       Anticoagulation Care Providers     Provider Role Specialty Phone number    Ayah Rojas NP [facility] [phone]            See the Encounter Report to view Anticoagulation Flowsheet and Dosing Calendar (Go to Encounters tab in chart review, and find the Anticoagulation Therapy Visit)        Stefany SIMS  Amina RN

## 2018-01-25 DIAGNOSIS — F25.1 SCHIZOAFFECTIVE DISORDER, DEPRESSIVE TYPE (H): Primary | ICD-10-CM

## 2018-01-25 DIAGNOSIS — Z79.899 DRUG THERAPY: ICD-10-CM

## 2018-01-25 LAB — LITHIUM SERPL-SCNC: 0.69 MMOL/L (ref 0.6–1.2)

## 2018-01-25 PROCEDURE — 80178 ASSAY OF LITHIUM: CPT | Mod: ZL | Performed by: NURSE PRACTITIONER

## 2018-01-25 PROCEDURE — 36415 COLL VENOUS BLD VENIPUNCTURE: CPT | Mod: ZL | Performed by: NURSE PRACTITIONER

## 2018-02-06 ENCOUNTER — ANTICOAGULATION THERAPY VISIT (OUTPATIENT)
Dept: ANTICOAGULATION | Facility: OTHER | Age: 58
End: 2018-02-06
Payer: COMMERCIAL

## 2018-02-06 DIAGNOSIS — Z79.01 LONG TERM CURRENT USE OF ANTICOAGULANT THERAPY: ICD-10-CM

## 2018-02-06 DIAGNOSIS — I26.99 PULMONARY EMBOLISM AND INFARCTION (H): ICD-10-CM

## 2018-02-06 DIAGNOSIS — Z86.718 HISTORY OF DEEP VENOUS THROMBOSIS: ICD-10-CM

## 2018-02-06 LAB — INR BLD: 2.3 (ref 0.86–1.14)

## 2018-02-06 PROCEDURE — 85610 PROTHROMBIN TIME: CPT | Mod: QW,ZL | Performed by: NURSE PRACTITIONER

## 2018-02-06 PROCEDURE — 36416 COLLJ CAPILLARY BLOOD SPEC: CPT | Mod: ZL | Performed by: NURSE PRACTITIONER

## 2018-02-06 NOTE — MR AVS SNAPSHOT
Frances Corea   2/6/2018   Anticoagulation Therapy Visit    Description:  57 year old female   Provider:  Ayah Rojas NP   Department:  [facility]           INR as of 2/6/2018     Today's INR 2.3      Anticoagulation Summary as of 2/6/2018     INR goal 2.0-3.0   Today's INR 2.3   Full instructions 6 mg on Mon, Wed, Fri; 9 mg all other days   Next INR check 3/6/2018    Indications   History of deep venous thrombosis [Z86.718]  History of pulmonary embolism (Resolved) [Z86.711]  Long-term (current) use of anticoagulants [Z79.01] (Resolved) [Z79.01]         February 2018 Details    Sun Mon Tue Wed Thu Fri Sat         1               2               3                 4               5               6      9 mg   See details      7      6 mg         8      9 mg         9      6 mg         10      9 mg           11      9 mg         12      6 mg         13      9 mg         14      6 mg         15      9 mg         16      6 mg         17      9 mg           18      9 mg         19      6 mg         20      9 mg         21      6 mg         22      9 mg         23      6 mg         24      9 mg           25      9 mg         26      6 mg         27      9 mg         28      6 mg             Date Details   02/06 This INR check               How to take your warfarin dose     To take:  6 mg Take 2 of the 3 mg tablets.    To take:  9 mg Take 3 of the 3 mg tablets.           March 2018 Details    Sun Mon Tue Wed Thu Fri Sat         1      9 mg         2      6 mg         3      9 mg           4      9 mg         5      6 mg         6            7               8               9               10                 11               12               13               14               15               16               17                 18               19               20               21               22               23               24                 25               26               27               28                29               30               31                Date Details   No additional details    Date of next INR:  3/6/2018         How to take your warfarin dose     To take:  6 mg Take 2 of the 3 mg tablets.    To take:  9 mg Take 3 of the 3 mg tablets.

## 2018-02-06 NOTE — PROGRESS NOTES
ANTICOAGULATION FOLLOW-UP CLINIC VISIT    Patient Name:  Frances Corea  Date:  2/6/2018  Contact Type:  Telephone/ message left on her mother's home phone voicemail    SUBJECTIVE:     Patient Findings     Positives No Problem Findings    Comments INR done by lab and result  Noted by warfarin clinic nurse. Call placed to patient mother's home phone and message left re: INR result, warfarin dosing and iNR recheck date. She is to call warfarin clinic if patient has any bleeding/bruising, changes in diet/meds/activitiy or questions.            OBJECTIVE    INR Point of Care   Date Value Ref Range Status   02/06/2018 2.3 (H) 0.86 - 1.14 Final     Comment:     This test is intended for monitoring Coumadin therapy.  Results are not   accurate in patients with prolonged INR due to factor deficiency.         ASSESSMENT / PLAN  INR assessment THER    Recheck INR In: 4 WEEKS    INR Location Clinic      Anticoagulation Summary as of 2/6/2018     INR goal 2.0-3.0   Today's INR 2.3   Maintenance plan 6 mg (3 mg x 2) on Mon, Wed, Fri; 9 mg (3 mg x 3) all other days   Full instructions 6 mg on Mon, Wed, Fri; 9 mg all other days   Weekly total 54 mg   No change documented Stefany Huynh, RN   Plan last modified Stefany Huynh, RN (12/26/2017)   Next INR check 3/6/2018   Target end date     Indications   History of deep venous thrombosis [Z86.718]  History of pulmonary embolism (Resolved) [Z86.711]  Long-term (current) use of anticoagulants [Z79.01] (Resolved) [Z79.01]         Anticoagulation Episode Summary     INR check location     Preferred lab     Send INR reminders to  [facility] POOL    Comments       Anticoagulation Care Providers     Provider Role Specialty Phone number    Ayah Rojas NP Responsible Family Practice [phone]            See the Encounter Report to view Anticoagulation Flowsheet and Dosing Calendar (Go to Encounters tab in chart review, and find the Anticoagulation Therapy Visit)        Stefany SIMS  Amina RN

## 2018-02-08 DIAGNOSIS — L20.9 ATOPIC DERMATITIS, UNSPECIFIED TYPE: ICD-10-CM

## 2018-02-09 RX ORDER — MOMETASONE FUROATE 1 MG/G
CREAM TOPICAL
Qty: 45 G | Refills: 0 | Status: SHIPPED | OUTPATIENT
Start: 2018-02-09 | End: 2019-10-07

## 2018-03-02 ENCOUNTER — MEDICAL CORRESPONDENCE (OUTPATIENT)
Dept: HEALTH INFORMATION MANAGEMENT | Facility: HOSPITAL | Age: 58
End: 2018-03-02

## 2018-03-06 ENCOUNTER — ANTICOAGULATION THERAPY VISIT (OUTPATIENT)
Dept: ANTICOAGULATION | Facility: OTHER | Age: 58
End: 2018-03-06
Payer: COMMERCIAL

## 2018-03-06 DIAGNOSIS — I26.99 PULMONARY EMBOLISM AND INFARCTION (H): ICD-10-CM

## 2018-03-06 DIAGNOSIS — Z86.718 HISTORY OF DEEP VENOUS THROMBOSIS: ICD-10-CM

## 2018-03-06 DIAGNOSIS — Z79.01 LONG TERM CURRENT USE OF ANTICOAGULANT THERAPY: ICD-10-CM

## 2018-03-06 LAB — INR BLD: 1.9 (ref 0.86–1.14)

## 2018-03-06 PROCEDURE — 36416 COLLJ CAPILLARY BLOOD SPEC: CPT | Mod: ZL | Performed by: NURSE PRACTITIONER

## 2018-03-06 PROCEDURE — 85610 PROTHROMBIN TIME: CPT | Mod: QW,ZL | Performed by: NURSE PRACTITIONER

## 2018-03-06 NOTE — PROGRESS NOTES
ANTICOAGULATION FOLLOW-UP CLINIC VISIT    Patient Name:  Frances Corea  Date:  3/6/2018  Contact Type:  Telephone/ message left on her mother's voicemail    SUBJECTIVE:     Patient Findings     Comments INR done by lab and result noted by warfarin clinic. Call placed to patient mother and message left on her voicemail re: INR result, warfarin dosing and INR recheck date. She is to call warfarin clinic if patient has had any bleeding/bruising or changes in diet/meds/activity or questions.            OBJECTIVE    INR Point of Care   Date Value Ref Range Status   03/06/2018 1.9 (H) 0.86 - 1.14 Final     Comment:     This test is intended for monitoring Coumadin therapy.  Results are not   accurate in patients with prolonged INR due to factor deficiency.         ASSESSMENT / PLAN  INR assessment THER    Recheck INR In: 4 WEEKS    INR Location Clinic      Anticoagulation Summary as of 3/6/2018     INR goal 2.0-3.0   Today's INR 1.9!   Maintenance plan 6 mg (3 mg x 2) on Mon, Wed, Fri; 9 mg (3 mg x 3) all other days   Full instructions 3/6: 10.5 mg; Otherwise 6 mg on Mon, Wed, Fri; 9 mg all other days   Weekly total 54 mg   Plan last modified Stefany Huynh RN (12/26/2017)   Next INR check 4/3/2018   Target end date     Indications   History of deep venous thrombosis [Z86.718]  History of pulmonary embolism (Resolved) [Z86.711]  Long-term (current) use of anticoagulants [Z79.01] (Resolved) [Z79.01]         Anticoagulation Episode Summary     INR check location     Preferred lab     Send INR reminders to [facility] POOL    Comments       Anticoagulation Care Providers     Provider Role Specialty Phone number    Ayah Rojas NP [facility] [phone]            See the Encounter Report to view Anticoagulation Flowsheet and Dosing Calendar (Go to Encounters tab in chart review, and find the Anticoagulation Therapy Visit)        Stefany Huynh, RN

## 2018-03-06 NOTE — MR AVS SNAPSHOT
Frances Corea   3/6/2018   Anticoagulation Therapy Visit    Description:  57 year old female   Provider:  Ayah Rojas NP   Department:  [facility]           INR as of 3/6/2018     Today's INR 1.9!      Anticoagulation Summary as of 3/6/2018     INR goal 2.0-3.0   Today's INR 1.9!   Full instructions 3/6: 10.5 mg; Otherwise 6 mg on Mon, Wed, Fri; 9 mg all other days   Next INR check 4/3/2018    Indications   History of deep venous thrombosis [Z86.718]  History of pulmonary embolism (Resolved) [Z86.711]  Long-term (current) use of anticoagulants [Z79.01] (Resolved) [Z79.01]         March 2018 Details    Sun Mon Tue Wed Thu Fri Sat         1               2               3                 4               5               6      10.5 mg   See details      7      6 mg         8      9 mg         9      6 mg         10      9 mg           11      9 mg         12      6 mg         13      9 mg         14      6 mg         15      9 mg         16      6 mg         17      9 mg           18      9 mg         19      6 mg         20      9 mg         21      6 mg         22      9 mg         23      6 mg         24      9 mg           25      9 mg         26      6 mg         27      9 mg         28      6 mg         29      9 mg         30      6 mg         31      9 mg          Date Details   03/06 This INR check               How to take your warfarin dose     To take:  6 mg Take 2 of the 3 mg tablets.    To take:  9 mg Take 3 of the 3 mg tablets.    To take:  10.5 mg Take 3.5 of the 3 mg tablets.           April 2018 Details    Sun Mon Tue Wed Thu Fri Sat     1      9 mg         2      6 mg         3            4               5               6               7                 8               9               10               11               12               13               14                 15               16               17               18               19               20               21                  22               23               24               25               26               27               28                 29               30                     Date Details   No additional details    Date of next INR:  4/3/2018         How to take your warfarin dose     To take:  6 mg Take 2 of the 3 mg tablets.    To take:  9 mg Take 3 of the 3 mg tablets.

## 2018-04-03 ENCOUNTER — ANTICOAGULATION THERAPY VISIT (OUTPATIENT)
Dept: ANTICOAGULATION | Facility: OTHER | Age: 58
End: 2018-04-03
Payer: COMMERCIAL

## 2018-04-03 DIAGNOSIS — I26.99 PULMONARY EMBOLISM AND INFARCTION (H): ICD-10-CM

## 2018-04-03 DIAGNOSIS — Z79.01 LONG TERM CURRENT USE OF ANTICOAGULANT THERAPY: ICD-10-CM

## 2018-04-03 DIAGNOSIS — Z86.718 HISTORY OF DEEP VENOUS THROMBOSIS: ICD-10-CM

## 2018-04-03 LAB — INR BLD: 2.5 (ref 0.86–1.14)

## 2018-04-03 PROCEDURE — 36416 COLLJ CAPILLARY BLOOD SPEC: CPT | Mod: ZL | Performed by: NURSE PRACTITIONER

## 2018-04-03 PROCEDURE — 85610 PROTHROMBIN TIME: CPT | Mod: QW,ZL | Performed by: NURSE PRACTITIONER

## 2018-04-03 NOTE — PROGRESS NOTES
ANTICOAGULATION FOLLOW-UP CLINIC VISIT    Patient Name:  Frances Corea  Date:  4/3/2018  Contact Type:  Telephone/ message left on her mother's voicemail    SUBJECTIVE:     Patient Findings     Comments Call placed to patient's mother's phone and message left re: INR result, warfarin dosing and INR recheck date. Frances or her mom are to notify warfarin clinic if she has any bleeding/bruising, changes in diet/meds/activity or questions.            OBJECTIVE    INR Point of Care   Date Value Ref Range Status   04/03/2018 2.5 (H) 0.86 - 1.14 Final     Comment:     This test is intended for monitoring Coumadin therapy.  Results are not   accurate in patients with prolonged INR due to factor deficiency.         ASSESSMENT / PLAN  INR assessment THER    Recheck INR In: 4 WEEKS    INR Location Clinic      Anticoagulation Summary as of 4/3/2018     INR goal 2.0-3.0   Today's INR 2.5   Maintenance plan 6 mg (3 mg x 2) on Mon, Wed, Fri; 9 mg (3 mg x 3) all other days   Full instructions 6 mg on Mon, Wed, Fri; 9 mg all other days   Weekly total 54 mg   No change documented Stefany Huynh RN   Plan last modified Stefany Huynh RN (12/26/2017)   Next INR check 5/1/2018   Target end date     Indications   History of deep venous thrombosis [Z86.718]  History of pulmonary embolism (Resolved) [Z86.711]  Long-term (current) use of anticoagulants [Z79.01] (Resolved) [Z79.01]         Anticoagulation Episode Summary     INR check location     Preferred lab     Send INR reminders to [facility] POOL    Comments       Anticoagulation Care Providers     Provider Role Specialty Phone number    Ayah Rojas NP [facility] [phone]            See the Encounter Report to view Anticoagulation Flowsheet and Dosing Calendar (Go to Encounters tab in chart review, and find the Anticoagulation Therapy Visit)        Stefany Huynh RN

## 2018-04-03 NOTE — MR AVS SNAPSHOT
Frances Corea   4/3/2018   Anticoagulation Therapy Visit    Description:  57 year old female   Provider:  Ayah Rojas NP   Department:  [facility]           INR as of 4/3/2018     Today's INR 2.5      Anticoagulation Summary as of 4/3/2018     INR goal 2.0-3.0   Today's INR 2.5   Full instructions 6 mg on Mon, Wed, Fri; 9 mg all other days   Next INR check 5/1/2018    Indications   History of deep venous thrombosis [Z86.718]  History of pulmonary embolism (Resolved) [Z86.711]  Long-term (current) use of anticoagulants [Z79.01] (Resolved) [Z79.01]         April 2018 Details    Sun Mon Tue Wed Thu Fri Sat     1               2               3      9 mg   See details      4      6 mg         5      9 mg         6      6 mg         7      9 mg           8      9 mg         9      6 mg         10      9 mg         11      6 mg         12      9 mg         13      6 mg         14      9 mg           15      9 mg         16      6 mg         17      9 mg         18      6 mg         19      9 mg         20      6 mg         21      9 mg           22      9 mg         23      6 mg         24      9 mg         25      6 mg         26      9 mg         27      6 mg         28      9 mg           29      9 mg         30      6 mg               Date Details   04/03 This INR check               How to take your warfarin dose     To take:  6 mg Take 2 of the 3 mg tablets.    To take:  9 mg Take 3 of the 3 mg tablets.           May 2018 Details    Sun Mon Tue Wed Thu Fri Sat       1            2               3               4               5                 6               7               8               9               10               11               12                 13               14               15               16               17               18               19                 20               21               22               23               24               25               26                 27                28               29               30               31                  Date Details   No additional details    Date of next INR:  5/1/2018         How to take your warfarin dose     To take:  9 mg Take 3 of the 3 mg tablets.

## 2018-04-16 PROBLEM — Z79.899 ENCOUNTER FOR LITHIUM MONITORING: Status: ACTIVE | Noted: 2018-04-16

## 2018-04-16 PROBLEM — M25.562 CHRONIC PAIN OF BOTH KNEES: Status: ACTIVE | Noted: 2018-04-16

## 2018-04-16 PROBLEM — M25.561 CHRONIC PAIN OF BOTH KNEES: Status: ACTIVE | Noted: 2018-04-16

## 2018-04-16 PROBLEM — Z51.81 ENCOUNTER FOR LITHIUM MONITORING: Status: ACTIVE | Noted: 2018-04-16

## 2018-04-16 PROBLEM — G89.29 CHRONIC PAIN OF BOTH KNEES: Status: ACTIVE | Noted: 2018-04-16

## 2018-04-17 ENCOUNTER — OFFICE VISIT (OUTPATIENT)
Dept: FAMILY MEDICINE | Facility: OTHER | Age: 58
End: 2018-04-17
Attending: NURSE PRACTITIONER
Payer: COMMERCIAL

## 2018-04-17 VITALS
SYSTOLIC BLOOD PRESSURE: 104 MMHG | WEIGHT: 293 LBS | HEART RATE: 88 BPM | DIASTOLIC BLOOD PRESSURE: 70 MMHG | OXYGEN SATURATION: 88 % | BODY MASS INDEX: 42.9 KG/M2 | RESPIRATION RATE: 16 BRPM

## 2018-04-17 DIAGNOSIS — D17.30 LIPOMA OF SKIN AND SUBCUTANEOUS TISSUE: ICD-10-CM

## 2018-04-17 DIAGNOSIS — G89.29 CHRONIC PAIN OF BOTH KNEES: ICD-10-CM

## 2018-04-17 DIAGNOSIS — Z51.81 ENCOUNTER FOR THERAPEUTIC DRUG MONITORING: ICD-10-CM

## 2018-04-17 DIAGNOSIS — R53.82 CHRONIC FATIGUE: ICD-10-CM

## 2018-04-17 DIAGNOSIS — E55.9 VITAMIN D DEFICIENCY: ICD-10-CM

## 2018-04-17 DIAGNOSIS — K21.9 GASTROESOPHAGEAL REFLUX DISEASE WITHOUT ESOPHAGITIS: ICD-10-CM

## 2018-04-17 DIAGNOSIS — E03.9 ACQUIRED HYPOTHYROIDISM: ICD-10-CM

## 2018-04-17 DIAGNOSIS — M25.561 CHRONIC PAIN OF BOTH KNEES: ICD-10-CM

## 2018-04-17 DIAGNOSIS — F31.81 BIPOLAR 2 DISORDER (H): ICD-10-CM

## 2018-04-17 DIAGNOSIS — Z86.718 HISTORY OF DEEP VENOUS THROMBOSIS: ICD-10-CM

## 2018-04-17 DIAGNOSIS — F41.9 ANXIETY: ICD-10-CM

## 2018-04-17 DIAGNOSIS — E78.5 HYPERLIPIDEMIA WITH TARGET LDL LESS THAN 100: Primary | ICD-10-CM

## 2018-04-17 DIAGNOSIS — Z12.31 ENCOUNTER FOR SCREENING MAMMOGRAM FOR BREAST CANCER: ICD-10-CM

## 2018-04-17 DIAGNOSIS — M25.562 CHRONIC PAIN OF BOTH KNEES: ICD-10-CM

## 2018-04-17 DIAGNOSIS — I10 BENIGN ESSENTIAL HYPERTENSION: ICD-10-CM

## 2018-04-17 DIAGNOSIS — J98.9 REACTIVE AIRWAY DISEASE THAT IS NOT ASTHMA: ICD-10-CM

## 2018-04-17 DIAGNOSIS — G47.33 OSA (OBSTRUCTIVE SLEEP APNEA): ICD-10-CM

## 2018-04-17 DIAGNOSIS — Z51.81 ENCOUNTER FOR MONITORING STATIN THERAPY: ICD-10-CM

## 2018-04-17 DIAGNOSIS — Z99.89 BIPAP (BIPHASIC POSITIVE AIRWAY PRESSURE) DEPENDENCE: ICD-10-CM

## 2018-04-17 DIAGNOSIS — Z99.81 ON HOME OXYGEN THERAPY: ICD-10-CM

## 2018-04-17 DIAGNOSIS — Z86.711 HISTORY OF PULMONARY EMBOLISM: ICD-10-CM

## 2018-04-17 DIAGNOSIS — Z79.01 CHRONIC ANTICOAGULATION: Chronic | ICD-10-CM

## 2018-04-17 DIAGNOSIS — Z79.899 ENCOUNTER FOR MONITORING STATIN THERAPY: ICD-10-CM

## 2018-04-17 PROCEDURE — G0463 HOSPITAL OUTPT CLINIC VISIT: HCPCS

## 2018-04-17 PROCEDURE — 99215 OFFICE O/P EST HI 40 MIN: CPT | Performed by: NURSE PRACTITIONER

## 2018-04-17 RX ORDER — ALBUTEROL SULFATE 0.83 MG/ML
1 SOLUTION RESPIRATORY (INHALATION)
COMMUNITY
End: 2018-12-24

## 2018-04-17 ASSESSMENT — ANXIETY QUESTIONNAIRES
GAD7 TOTAL SCORE: 7
5. BEING SO RESTLESS THAT IT IS HARD TO SIT STILL: SEVERAL DAYS
2. NOT BEING ABLE TO STOP OR CONTROL WORRYING: SEVERAL DAYS
7. FEELING AFRAID AS IF SOMETHING AWFUL MIGHT HAPPEN: SEVERAL DAYS
6. BECOMING EASILY ANNOYED OR IRRITABLE: SEVERAL DAYS
3. WORRYING TOO MUCH ABOUT DIFFERENT THINGS: SEVERAL DAYS
4. TROUBLE RELAXING: SEVERAL DAYS
IF YOU CHECKED OFF ANY PROBLEMS ON THIS QUESTIONNAIRE, HOW DIFFICULT HAVE THESE PROBLEMS MADE IT FOR YOU TO DO YOUR WORK, TAKE CARE OF THINGS AT HOME, OR GET ALONG WITH OTHER PEOPLE: SOMEWHAT DIFFICULT
1. FEELING NERVOUS, ANXIOUS, OR ON EDGE: SEVERAL DAYS

## 2018-04-17 ASSESSMENT — PAIN SCALES - GENERAL: PAINLEVEL: NO PAIN (0)

## 2018-04-17 NOTE — PROGRESS NOTES
SUBJECTIVE:   Frances Corea is a 57 year old female who presents to clinic today for the following health issues:        Hyperlipidemia Follow-Up    Rate your low fat/cholesterol diet?: good    Taking statin?  Yes, Zocor    Other lipid medications/supplements?:  none      Hypertension Follow-up    Outpatient blood pressures are being checked at home.  Results are 120-140/70-80.    Low Salt Diet: no added salt      Hypothyroidism Follow-up    Since last visit, patient describes the following symptoms: Weight stable, no hair loss, no skin changes, no constipation, no loose stools      History of PE:  Taking coumadin since 2010, since then has been using neb tx's twice daily    Amount of exercise or physical activity: 6-7 days/week for an average of 15-30 minutes    Problems taking medications regularly: No    Medication side effects: none    Diet: low salt and low fat/cholesterol\        Vitamin D deficiency - on OTC D3      Lump:  Right upper shoulder blade, few months, no pain, no redness, father had same lump in same place.      Depression and Anxiety Follow-Up - Mental health management and med's by Delores Payan NP    Status since last visit: No change    Other associated symptoms:None    Complicating factors:     Significant life event: No     Current substance abuse: None      PHQ-9 SCORE 8/8/2017 12/26/2017 4/17/2018   Total Score 8 5 7     CHAVEZ-7 SCORE 12/30/2016 8/8/2017 4/17/2018   Total Score 0 7 7         PHQ-9  English  PHQ-9   Any Language  CHAVEZ-7  Suicide Assessment Five-step Evaluation and Treatment (SAFE-T)      Chronic Pain Follow-Up       Type / Location of Pain: bilateral knee pain  Analgesia/pain control:       Recent changes:  improved      Overall control: Tolerable with discomfort  Activity level/function:      Daily activities:  Able to do moderate activities     Work:  not applicable  Adverse effects:  No  Adherance    Taking medication as directed?  Yes    Participating in other  treatments: yes  Risk Factors:    Sleep:  Fair    Mood/anxiety:  controlled    Recent family or social stressors:  none noted    Other aggravating factors: none          Amount of exercise or physical activity: stationary bike daily    Problems taking medications regularly: No    Medication side effects: none    Diet: regular (no restrictions)             Problem list and histories reviewed & adjusted, as indicated.  Additional history: as documented    Patient Active Problem List   Diagnosis     Undifferentiated schizophrenia (H)     History of pulmonary embolism     RADHA (obstructive sleep apnea)     Chronic anticoagulation     Hyperlipidemia with target LDL less than 100     Gastroesophageal reflux disease without esophagitis     Anxiety     Primary insomnia     Psoriasis     Bipolar 2 disorder (H)     ACP (advance care planning)     Benign essential hypertension     History of deep venous thrombosis     Morbid obesity (H)     Encounter for monitoring statin therapy     Aspirin contraindicated     Acquired hypothyroidism     BiPAP (biphasic positive airway pressure) dependence     On home oxygen therapy - 3L at hs     Vitamin D deficiency     Encounter for lithium monitoring     Chronic pain of both knees     Reactive airway disease that is not asthma     Past Surgical History:   Procedure Laterality Date     GYN SURGERY      total hyst     HC ECP WITH CATARACT SURGERY      lt eye     HC OR OCULAR DEVICE INTRAOP DETACHED RETINA       HYSTERECTOMY       REPAIR LACERATION HAND  9/28/2013    Procedure: REPAIR LACERATION HAND;  repair left fifth finger laceration;  Surgeon: Miranda Xavier DO;  Location: [facility]       Social History   Substance Use Topics     Smoking status: Never Smoker     Smokeless tobacco: Never Used     Alcohol use No     Family History   Problem Relation Age of Onset     Ovarian Cancer Mother      CEREBROVASCULAR DISEASE Father          Current Outpatient Prescriptions   Medication Sig Dispense  Refill     METHIMAZOLE PO Take 5 mg by mouth daily       UNABLE TO FIND Home oxygen 3 L/M       UNABLE TO FIND Home nebulizer       albuterol (2.5 MG/3ML) 0.083% neb solution Take 1 vial by nebulization Twice daily       order for DME Equipment being ordered: Neb machine and tubing 1 Device 0     mometasone (ELOCON) 0.1 % cream APPLY SPARINGLY EXTERNALLY TO THE AFFECTED AREA TWICE DAILY AS NEEDED. DO NOT APPLY TO FACE 45 g 0     lisinopril (PRINIVIL/ZESTRIL) 10 MG tablet Take 1 tablet (10 mg) by mouth daily 90 tablet 1     traMADol (ULTRAM) 50 MG tablet TAKE 1 TO 2 TABLETS BY MOUTH EVERY 6 HOURS AS NEEDED FOR PAIN. MAX OF 6 TABLETS EVERY DAY 60 tablet 5     warfarin (COUMADIN) 3 MG tablet Take 6mg Mon/Wed/Fri; 9mg all other days or as directed by [facility] 221 tablet 1     CLOZAPINE PO (CLOZARIL) Take 25 mg by mouth every morning       lithium 300 MG tablet Take 300 mg by mouth Take 1tab every  am and 2pm, 2 tabs at HS       simvastatin (ZOCOR) 20 MG tablet Take 1 tablet (20 mg) by mouth At Bedtime 90 tablet 3     ASPIRIN NOT PRESCRIBED (INTENTIONAL) Please choose reason not prescribed, below 0 each 0     triamcinolone (KENALOG) 0.1 % ointment APPLY TOPICALLY TWICE DAILY TO BACK OF NECK AND HAIRLINE 80 g 1     order for DME Equipment being ordered: [employer] and supplies - states it has been 5 years 1 Device 0     clobetasol (TEMOVATE) 0.05 % ointment APPLY TOPICALLY TWICE DAILY ON THE BACK OF THE NECK AND HAIRLINE 60 g 0     LORAZEPAM PO Take 0.5 mg by mouth 1-2 twice daily as needed       CLOZAPINE PO (CLOZARIL) Take 50 mg by mouth At Bedtime        sennosides (SENOKOT) 8.6 MG tablet Take 2 tablets by mouth daily as needed for constipation 120 tablet 1     order for DME Wheeled walker 1 Device 0     MAGNESIUM OXIDE PO Take 400 mg by mouth daily        Multiple Vitamins-Minerals (MULTIVITAMIN & MINERAL PO) Take by mouth daily        Probiotic Product (PROBIOTIC DAILY PO) Take by mouth daily        Calcium  Carbonate-Vitamin D (CALCIUM + D PO) Take by mouth daily        Lutein 20 MG CAPS Take by mouth daily        Acetaminophen (TYLENOL PO) Take 500 mg by mouth       MELATONIN PO Take 3 mg by mouth At Bedtime Pt takes at 8 PM       latanoprost (XALATAN) 0.005 % ophthalmic solution Place 1 drop into both eyes At Bedtime       Lansoprazole (PREVACID PO) Take 15 mg by mouth daily as needed        glucosamine-chondroitin 500-400 MG CAPS Take 1 capsule by mouth daily       [DISCONTINUED] warfarin (COUMADIN) 3 MG tablet 6 mg (2 tabs) Mon,Wed,Fri and 9mg (3 tabs) all other days or as directed by warfarin clinic 225 tablet 1     [DISCONTINUED] METHIMAZOLE PO Take 5 mg by mouth daily 2 pills daily        Allergies   Allergen Reactions     Depakote      Increased lfts and ammonia     Zyprexa Other (See Comments)     Increased lft and increased ammonia.     Adhesive Tape Rash     Levaquin      Recent Labs   Lab Test  12/26/17   1508  08/25/17   1420   01/24/17   1525   A1C  4.9  5.0   --   4.8   LDL  117*  112*   --   86   HDL  65  54   --   61   TRIG  153*  128   --   115   ALT  26  27   --   31   CR  0.56  0.53   < >  0.61   GFRESTIMATED  >90  >90   < >  >90  Non  GFR Calc     GFRESTBLACK  >90  >90   < >  >90   GFR Calc     POTASSIUM  4.5  4.1   < >  4.1   TSH   --   2.20   --   6.63*    < > = values in this interval not displayed.      BP Readings from Last 3 Encounters:   04/17/18 104/70   12/26/17 110/68   08/08/17 114/74    Wt Readings from Last 3 Encounters:   04/17/18 299 lb (135.6 kg)   12/26/17 296 lb (134.3 kg)   08/08/17 293 lb (132.9 kg)                  Labs reviewed in EPIC    Reviewed and updated as needed this visit by clinical staff  Tobacco  Allergies  Meds  Med Hx  Surg Hx  Fam Hx  Soc Hx      Reviewed and updated as needed this visit by Provider         ROS:  CONSTITUTIONAL: NEGATIVE for fever, chills, change in weight  INTEGUMENTARY/SKIN: lump left scapula  EYES:  NEGATIVE for vision changes or irritation  ENT/MOUTH: NEGATIVE for ear, mouth and throat problems  RESP: NEGATIVE for significant cough or SOB  BREAST: NEGATIVE for masses, tenderness or discharge  CV: NEGATIVE for chest pain, palpitations or peripheral edema  GI: NEGATIVE for nausea, abdominal pain, heartburn, or change in bowel habits  : NEGATIVE for frequency, dysuria, or hematuria  MUSCULOSKELETAL:Bilateral knee pain  NEURO: NEGATIVE for weakness, dizziness or paresthesias  ENDOCRINE: NEGATIVE for temperature intolerance, skin/hair changes  HEME: NEGATIVE for bleeding problems  PSYCHIATRIC: NEGATIVE for changes in mood or affect    OBJECTIVE:     /70 (BP Location: Right arm, Patient Position: Sitting, Cuff Size: Adult Large)  Pulse 88  Resp 16  Wt 299 lb (135.6 kg)  SpO2 (!) 88%  BMI 42.9 kg/m2  Body mass index is 42.9 kg/(m^2).     GENERAL: healthy, alert and no distress  GENERAL: healthy, alert and no distress  HENT: ear canals and TM's normal, nose and mouth without ulcers or lesions  NECK: no adenopathy, no asymmetry, masses, or scars and thyroid normal to palpation  RESP: lungs clear to auscultation - no rales, rhonchi or wheezes  CV: regular rate and rhythm, normal S1 S2, no S3 or S4, no murmur, click or rub, no peripheral edema and peripheral pulses strong  ABDOMEN: soft, nontender, no hepatosplenomegaly, no masses and bowel sounds normal  MS: Bilateral knee pain  SKIN: Lipoma - 2 cm, circular, firm, movable, right scapula - non tender  BACK: no CVA tenderness, no paralumbar tenderness  PSYCH: mentation appears normal, affect normal/bright      Visit time:   Over 50  minutes are spent with the patient.   Greater than 50 % of our visit time was spent face to face and included education and counseling regarding current conditions,  medication management, and plan of care.  We discussed the importance of medication compliance.  Visit Content:   Lab testing discussed and reviewed  Any medication  adjustment has been reviewed  Health Maintenance - updated as appropriate.  Record review completed    View Full Report                 CBC with platelets differential   Order: [id number]   Collected:  4/24/2018 11:29 AM   Notes Recorded by Ayah Rojas NP on 4/24/2018 at 3:48 PM  Other tests ordered - will address when they have all returned  ------    Notes Recorded by Ayah Rojas NP on 4/24/2018 at 12:43 PM  Other tests ordered - will address when they have all returned      Ref Range & Units 11:29 AM     WBC 4.0 - 11.0 10e9/L 9.3   RBC Count 3.8 - 5.2 10e12/L 4.77   Hemoglobin 11.7 - 15.7 g/dL 13.6   Hematocrit 35.0 - 47.0 % 43.8   MCV 78 - 100 fl 92   MCH 26.5 - 33.0 pg 28.5   MCHC 31.5 - 36.5 g/dL 31.1 (L)   RDW 10.0 - 15.0 % 15.2 (H)   Platelet Count 150 - 450 10e9/L 148 (L)   Diff Method  Automated Method   % Neutrophils % 80.0   % Lymphocytes % 13.5   % Monocytes % 5.2   % Eosinophils % 1.1   % Basophils % 0.2   Absolute Neutrophil 1.6 - 8.3 10e9/L 7.4   Absolute Lymphocytes 0.8 - 5.3 10e9/L 1.3   Absolute Monocytes 0.0 - 1.3 10e9/L 0.5   Absolute Eosinophils 0.0 - 0.7 10e9/L 0.1   Absolute Basophils 0.0 - 0.2 10e9/L 0.0   View Full Report              Lipid Profile   Order: [id number]   Collected:  4/24/2018 11:29 AM      Ref Range & Units 11:29 AM     Cholesterol <200 mg/dL 152   Triglycerides <150 mg/dL 130   Comments: Fasting specimen   HDL Cholesterol >49 mg/dL 52   LDL Cholesterol Calculated <100 mg/dL 74   Comments: Desirable:       <100 mg/dl   Non HDL Cholesterol <130 mg/dL 100   View Full Report                 Comprehensive metabolic panel   Order: [id number]   Collected:  4/24/2018 11:29 AM      Ref Range & Units 11:29 AM     Sodium 133 - 144 mmol/L 140   Potassium 3.4 - 5.3 mmol/L 4.3   Chloride 94 - 109 mmol/L 106   Carbon Dioxide 20 - 32 mmol/L 27   Anion Gap 3 - 14 mmol/L 7   Glucose 70 - 99 mg/dL 116 (H)   Comments: Fasting specimen   Urea Nitrogen 7 - 30 mg/dL 11   Creatinine 0.52 - 1.04  mg/dL 0.53   GFR Estimate >60 mL/min/1.7m2 >90   Comments: Non  GFR Calc   GFR Estimate If Black >60 mL/min/1.7m2 >90   Comments: African American GFR Calc   Calcium 8.5 - 10.1 mg/dL 9.4   Bilirubin Total 0.2 - 1.3 mg/dL 0.7   Albumin 3.4 - 5.0 g/dL 3.9   Protein Total 6.8 - 8.8 g/dL 7.5   Alkaline Phosphatase 40 - 150 U/L 107   ALT 0 - 50 U/L 21   AST 0 - 45 U/L 13   View Full Report                Bilirubin direct   Order: [id number]   Collected:  4/24/2018 11:29 AM      Ref Range & Units 11:29 AM     Bilirubin Direct 0.0 - 0.2 mg/dL 0.1   View Full Report              TSH with free T4 reflex   Order: [id number]   Collected:  4/24/2018 11:29 AM      Ref Range & Units 11:29 AM     TSH 0.40 - 4.00 mU/L 1.82   View Full Report              Vitamin D Deficiency   Order: [id number]   Collected:  4/24/2018 11:29 AM Status:  In process   View Full Report                 CBC with platelets differential   Order: [id number]   Collected:  4/24/2018 11:29 AM   Notes Recorded by Ayah Rojas NP on 4/24/2018 at 3:48 PM  Other tests ordered - will address when they have all returned  ------    Notes Recorded by Ayah Rojas NP on 4/24/2018 at 12:43 PM  Other tests ordered - will address when they have all returned      Ref Range & Units 11:29 AM     WBC 4.0 - 11.0 10e9/L 9.3   RBC Count 3.8 - 5.2 10e12/L 4.77   Hemoglobin 11.7 - 15.7 g/dL 13.6   Hematocrit 35.0 - 47.0 % 43.8   MCV 78 - 100 fl 92   MCH 26.5 - 33.0 pg 28.5   MCHC 31.5 - 36.5 g/dL 31.1 (L)   RDW 10.0 - 15.0 % 15.2 (H)   Platelet Count 150 - 450 10e9/L 148 (L)   Diff Method  Automated Method   % Neutrophils % 80.0   % Lymphocytes % 13.5   % Monocytes % 5.2   % Eosinophils % 1.1   % Basophils % 0.2   Absolute Neutrophil 1.6 - 8.3 10e9/L 7.4   Absolute Lymphocytes 0.8 - 5.3 10e9/L 1.3   Absolute Monocytes 0.0 - 1.3 10e9/L 0.5   Absolute Eosinophils 0.0 - 0.7 10e9/L 0.1   Absolute Basophils 0.0 - 0.2 10e9/L 0.0   View Full Report              Lipid Profile    Order: [id number]   Collected:  4/24/2018 11:29 AM      Ref Range & Units 11:29 AM     Cholesterol <200 mg/dL 152   Triglycerides <150 mg/dL 130   Comments: Fasting specimen   HDL Cholesterol >49 mg/dL 52   LDL Cholesterol Calculated <100 mg/dL 74   Comments: Desirable:       <100 mg/dl   Non HDL Cholesterol <130 mg/dL 100   View Full Report                 ASSESSMENT/PLAN:     Hyperlipidemia; controlled   Plan:  No changes in the patient's current treatment plan    Hypertension; controlled   Associated with the following complications:    None   Plan:  No changes in the patient's current treatment plan    Hypothyroidism; controlled/euthyroid   Plan:  No changes in the patient's current treatment plan          1. Hyperlipidemia with target LDL less than 100  - Continue plan of care  - Fish oil 3 per day  - Low fat diet  - Lipid Profile; Future    2. Encounter for monitoring statin therapy  - Comprehensive metabolic panel; Future  - Hepatic panel; Future    3. Benign essential hypertension  - Continue plan of care  - Comprehensive metabolic panel; Future  - *UA reflex to Microscopic and Culture - [facility]; Future    4. Vitamin D deficiency  - Vitamin D level  - OTC D3    5. Chronic pain of both knees  - Continue plan of care  - Walker / cane    6. Acquired hypothyroidism  - Continue plan of care  - TSH with free T4 reflex; Future    7. RADHA (obstructive sleep apnea)  - Continue plan of care    8. BiPAP (biphasic positive airway pressure) dependence  - Continue plan of care    9. On home oxygen therapy - 3L at hs  - Continue plan of care    10. History of deep venous thrombosis  - Continue plan of care    11. History of pulmonary embolism  - Continue with coumadin clinic    12. Chronic anticoagulation  - Continue FU with coumadin clinic    13. Bipolar 2 disorder (H)  - Continue plan of care  - FU with Dleores Payan as directed    14. Anxiety  - Continue plan of care  - FU with Delores Payan as established    15.  Gastroesophageal reflux disease without esophagitis  - Continue plan of care    16. Reactive airway disease that is not asthma  - Neb solution as directed  - order for DME; Equipment being ordered: Neb machine and tubing  Dispense: 1 Device; Refill: 0    17. Encounter for screening mammogram for breast cancer  - MA Screening Digital Bilateral ([facility]); Future    18. Encounter for therapeutic drug monitoring  - Lithium level; Future    19. Chronic fatigue  - CBC with platelets differential; Future    20. Lipoma of skin and subcutaneous tissue  - FU if this area becomes painful or concerning      FU 3 months  Return next week for fasting lab  Continue FU with Delores Payan for mental health care        Ayah Rojas NP  [facility]

## 2018-04-17 NOTE — NURSING NOTE
"Chief Complaint   Patient presents with     Chronic Disease Management     non-fasting       Initial /70 (BP Location: Right arm, Patient Position: Sitting, Cuff Size: Adult Large)  Pulse 88  Resp 16  Wt 299 lb (135.6 kg)  SpO2 (!) 88%  BMI 42.9 kg/m2 Estimated body mass index is 42.9 kg/(m^2) as calculated from the following:    Height as of 12/26/17: 5' 10\" (1.778 m).    Weight as of this encounter: 299 lb (135.6 kg).  Medication Reconciliation: complete     Doris Huerta      "

## 2018-04-17 NOTE — PATIENT INSTRUCTIONS
1. Hyperlipidemia with target LDL less than 100  - Continue plan of care  - Fish oil 3 per day  - Low fat diet  - Lipid Profile; Future    2. Encounter for monitoring statin therapy  - Comprehensive metabolic panel; Future  - Hepatic panel; Future    3. Benign essential hypertension  - Continue plan of care  - Comprehensive metabolic panel; Future  - *UA reflex to Microscopic and Culture - [facility]; Future    4. Vitamin D deficiency  - Vitamin D level  - OTC D3    5. Chronic pain of both knees  - Continue plan of care  - Walker / cane    6. Acquired hypothyroidism  - Continue plan of care  - TSH with free T4 reflex; Future    7. RADHA (obstructive sleep apnea)  - Continue plan of care    8. BiPAP (biphasic positive airway pressure) dependence  - Continue plan of care    9. On home oxygen therapy - 3L at hs  - Continue plan of care    10. History of deep venous thrombosis  - Continue plan of care    11. History of pulmonary embolism  - Continue with coumadin clinic    12. Chronic anticoagulation  - Continue FU with coumadin clinic    13. Bipolar 2 disorder (H)  - Continue plan of care  - FU with Delores Payan as directed    14. Anxiety  - Continue plan of care  - FU with Delores Payan as established    15. Gastroesophageal reflux disease without esophagitis  - Continue plan of care    16. Reactive airway disease that is not asthma  - Neb solution as directed  - order for DME; Equipment being ordered: Neb machine and tubing  Dispense: 1 Device; Refill: 0    17. Encounter for screening mammogram for breast cancer  - MA Screening Digital Bilateral ([facility]); Future    18. Encounter for therapeutic drug monitoring  - Lithium level; Future    19. Chronic fatigue  - CBC with platelets differential; Future    20. Lipoma of skin and subcutaneous tissue  - FU if this area becomes painful or concerning      FU 3 months  Return next week for fasting lab  Continue FU with Delores Payan for mental health care        Ayah  JACK Rojas  [facility], [place]

## 2018-04-17 NOTE — MR AVS SNAPSHOT
After Visit Summary   4/17/2018    Frances Corea    MRN: [id number]           Patient Information     Date Of Birth          1960        Visit Information        Provider Department      4/17/2018 1:45 PM Ayah Rojas NP [facility]        Today's Diagnoses     Hyperlipidemia with target LDL less than 100    -  1    Encounter for monitoring statin therapy        Benign essential hypertension        Vitamin D deficiency        Chronic pain of both knees        Acquired hypothyroidism        RADHA (obstructive sleep apnea)        BiPAP (biphasic positive airway pressure) dependence        On home oxygen therapy - 3L at hs        History of deep venous thrombosis        History of pulmonary embolism        Chronic anticoagulation        Bipolar 2 disorder (H)        Anxiety        Gastroesophageal reflux disease without esophagitis        Reactive airway disease that is not asthma        Encounter for screening mammogram for breast cancer        Encounter for therapeutic drug monitoring        Chronic fatigue        Lipoma of skin and subcutaneous tissue          Care Instructions        1. Hyperlipidemia with target LDL less than 100  - Continue plan of care  - Fish oil 3 per day  - Low fat diet  - Lipid Profile; Future    2. Encounter for monitoring statin therapy  - Comprehensive metabolic panel; Future  - Hepatic panel; Future    3. Benign essential hypertension  - Continue plan of care  - Comprehensive metabolic panel; Future  - *UA reflex to Microscopic and Culture - [facility]; Future    4. Vitamin D deficiency  - Vitamin D level  - OTC D3    5. Chronic pain of both knees  - Continue plan of care  - Walker / cane    6. Acquired hypothyroidism  - Continue plan of care  - TSH with free T4 reflex; Future    7. RADHA (obstructive sleep apnea)  - Continue plan of care    8. BiPAP (biphasic positive airway pressure) dependence  - Continue plan of care    9. On home oxygen therapy -  3L at hs  - Continue plan of care    10. History of deep venous thrombosis  - Continue plan of care    11. History of pulmonary embolism  - Continue with coumadin clinic    12. Chronic anticoagulation  - Continue FU with coumadin clinic    13. Bipolar 2 disorder (H)  - Continue plan of care  - FU with Delores Payan as directed    14. Anxiety  - Continue plan of care  - FU with Delores Payan as established    15. Gastroesophageal reflux disease without esophagitis  - Continue plan of care    16. Reactive airway disease that is not asthma  - Neb solution as directed  - order for DME; Equipment being ordered: Neb machine and tubing  Dispense: 1 Device; Refill: 0    17. Encounter for screening mammogram for breast cancer  - MA Screening Digital Bilateral ([facility]); Future    18. Encounter for therapeutic drug monitoring  - Lithium level; Future    19. Chronic fatigue  - CBC with platelets differential; Future    20. Lipoma of skin and subcutaneous tissue  - FU if this area becomes painful or concerning      FU 3 months  Return next week for fasting lab  Continue FU with [facility] Payan for mental health care        Ayah Rojas NP  [facility]            Follow-ups after your visit        Your next 10 appointments already scheduled     May 01, 2018  1:00 PM CDT   LAB with [facility]   [facility] ([facility]    [address]  [facility] [address]   [phone]              Future tests that were ordered for you today     Open Future Orders        Priority Expected Expires Ordered    Lithium level Routine  5/17/2018 4/17/2018    Comprehensive metabolic panel Routine  5/17/2018 4/17/2018    Lipid Profile Routine  5/17/2018 4/17/2018    Hepatic panel Routine  5/17/2018 4/17/2018    CBC with platelets differential Routine  5/17/2018 4/17/2018    Vitamin D Deficiency Routine  5/17/2018 4/17/2018    TSH with free T4 reflex Routine  5/17/2018 4/17/2018    *UA reflex to  "Microscopic and Culture - [facility] Routine  2018    MA Screening Digital Bilateral ([facility]) Routine  2019            Who to contact     If you have questions or need follow up information about today's clinic visit or your schedule please contact [facility] directly at [phone].  Normal or non-critical lab and imaging results will be communicated to you by MyChart, letter or phone within 4 business days after the clinic has received the results. If you do not hear from us within 7 days, please contact the clinic through MyChart or phone. If you have a critical or abnormal lab result, we will notify you by phone as soon as possible.  Submit refill requests through KAL or call your pharmacy and they will forward the refill request to us. Please allow 3 business days for your refill to be completed.          Additional Information About Your Visit        KAL Information     KAL lets you send messages to your doctor, view your test results, renew your prescriptions, schedule appointments and more. To sign up, go to [email or web address] . Click on \"Log in\" on the left side of the screen, which will take you to the Welcome page. Then click on \"Sign up Now\" on the right side of the page.     You will be asked to enter the access code listed below, as well as some personal information. Please follow the directions to create your username and password.     Your access code is: [email or web address]  Expires: 2018  3:23 PM     Your access code will  in 90 days. If you need help or a new code, please call your [place] clinic or [phone].        Care EveryWhere ID     This is your Care EveryWhere ID. This could be used by other organizations to access your [place] medical records  [id number]        Your Vitals Were     Pulse Respirations Pulse Oximetry BMI (Body Mass Index)          88 16 88% 42.9 kg/m2         Blood Pressure from Last 3 " Encounters:   04/17/18 104/70   12/26/17 110/68   08/08/17 114/74    Weight from Last 3 Encounters:   04/17/18 299 lb (135.6 kg)   12/26/17 296 lb (134.3 kg)   08/08/17 293 lb (132.9 kg)                 Today's Medication Changes          These changes are accurate as of 4/17/18  3:24 PM.  If you have any questions, ask your nurse or doctor.               Start taking these medicines.        Dose/Directions    order for DME   Used for:  Reactive airway disease that is not asthma   Started by:  Ayah Rojas NP        Equipment being ordered: Neb machine and tubing   Quantity:  1 Device   Refills:  0         These medicines have changed or have updated prescriptions.        Dose/Directions    METHIMAZOLE PO   This may have changed:  Another medication with the same name was removed. Continue taking this medication, and follow the directions you see here.   Changed by:  Ayah Rojas NP        Dose:  5 mg   Take 5 mg by mouth daily   Refills:  0       warfarin 3 MG tablet   Commonly known as:  COUMADIN   This may have changed:  Another medication with the same name was removed. Continue taking this medication, and follow the directions you see here.   Used for:  Long-term (current) use of anticoagulants, Chronic deep vein thrombosis (DVT) of left lower extremity (H)   Changed by:  Ayah Rojas NP        Take 6mg Mon/Wed/Fri; 9mg all other days or as directed by [facility]   Quantity:  221 tablet   Refills:  1         Stop taking these medicines if you haven't already. Please contact your care team if you have questions.     ASPIRIN PO   Stopped by:  Ayah Rojas NP           SYNTHROID PO   Stopped by:  Ayah Rojas NP                Where to get your medicines      Some of these will need a paper prescription and others can be bought over the counter.  Ask your nurse if you have questions.     Bring a paper prescription for each of these medications     order for DME                Primary Care Provider Office  Phone # Fax Stephany Rojas [phone] [address]  [place] [address]        Equal Access to Services     BRINA BRIONES : Hadii aad ku hadgreggo Sosujatha, waaxda luqadaha, qaybta kaalmada alyx, radha asterin hayaanoemi yenecho navindavida lulu armijo. So [facility] [phone].    ATENCIÓN: Si habla español, tiene a salter disposición servicios gratuitos de asistencia lingüística. Llame al [phone].    We comply with applicable federal civil rights laws and [place] laws. We do not discriminate on the basis of race, color, national origin, age, disability, sex, sexual orientation, or gender identity.            Thank you!     Thank you for choosing [facility]  for your care. Our goal is always to provide you with excellent care. Hearing back from our patients is one way we can continue to improve our services. Please take a few minutes to complete the written survey that you may receive in the mail after your visit with us. Thank you!             Your Updated Medication List - Protect others around you: Learn how to safely use, store and throw away your medicines at www.disposemymeds.org.          This list is accurate as of 4/17/18  3:24 PM.  Always use your most recent med list.                   Brand Name Dispense Instructions for use Diagnosis    albuterol (2.5 MG/3ML) 0.083% neb solution      Take 1 vial by nebulization Twice daily        ASPIRIN NOT PRESCRIBED    INTENTIONAL    0 each    Please choose reason not prescribed, below    Benign essential hypertension       CALCIUM + D PO      Take by mouth daily        clobetasol 0.05 % ointment    TEMOVATE    60 g    APPLY TOPICALLY TWICE DAILY ON THE BACK OF THE NECK AND HAIRLINE    Atopic dermatitis, unspecified type       * CLOZAPINE PO    CLOZARIL     Take 50 mg by mouth At Bedtime        * CLOZAPINE PO    CLOZARIL     Take 25 mg by mouth every morning        glucosamine-chondroitin 500-400 MG Caps per capsule      Take 1  capsule by mouth daily        latanoprost 0.005 % ophthalmic solution    XALATAN     Place 1 drop into both eyes At Bedtime        lisinopril 10 MG tablet    PRINIVIL/ZESTRIL    90 tablet    Take 1 tablet (10 mg) by mouth daily    Benign essential hypertension       lithium 300 MG tablet      Take 300 mg by mouth Take 1tab every  am and 2pm, 2 tabs at HS        LORAZEPAM PO      Take 0.5 mg by mouth 1-2 twice daily as needed        Lutein 20 MG Caps      Take by mouth daily        MAGNESIUM OXIDE PO      Take 400 mg by mouth daily        MELATONIN PO      Take 3 mg by mouth At Bedtime Pt takes at 8 PM        METHIMAZOLE PO      Take 5 mg by mouth daily        mometasone 0.1 % cream    ELOCON    45 g    APPLY SPARINGLY EXTERNALLY TO THE AFFECTED AREA TWICE DAILY AS NEEDED. DO NOT APPLY TO FACE    Atopic dermatitis, unspecified type       MULTIVITAMIN & MINERAL PO      Take by mouth daily        * order for DME     1 Device    Wheeled walker    Altered gait       * order for DME     1 Device    Equipment being ordered: Bipap and supplies - states it has been 5 years    RADHA (obstructive sleep apnea)       order for DME     1 Device    Equipment being ordered: Neb machine and tubing    Reactive airway disease that is not asthma       PREVACID PO      Take 15 mg by mouth daily as needed        PROBIOTIC DAILY PO      Take by mouth daily        sennosides 8.6 MG tablet    SENOKOT    120 tablet    Take 2 tablets by mouth daily as needed for constipation    Constipation, unspecified constipation type       simvastatin 20 MG tablet    ZOCOR    90 tablet    Take 1 tablet (20 mg) by mouth At Bedtime    History of pulmonary embolism       traMADol 50 MG tablet    ULTRAM    60 tablet    TAKE 1 TO 2 TABLETS BY MOUTH EVERY 6 HOURS AS NEEDED FOR PAIN. MAX OF 6 TABLETS EVERY DAY    Left knee pain, unspecified chronicity       triamcinolone 0.1 % ointment    KENALOG    80 g    APPLY TOPICALLY TWICE DAILY TO BACK OF NECK AND HAIRLINE     Atopic dermatitis, unspecified type       TYLENOL PO      Take 500 mg by mouth        UNABLE TO FIND      Home oxygen 3 L/M        UNABLE TO FIND      Home nebulizer        warfarin 3 MG tablet    COUMADIN    221 tablet    Take 6mg Mon/Wed/Fri; 9mg all other days or as directed by [facility]    Long-term (current) use of anticoagulants, Chronic deep vein thrombosis (DVT) of left lower extremity (H)       * Notice:  This list has 4 medication(s) that are the same as other medications prescribed for you. Read the directions carefully, and ask your doctor or other care provider to review them with you.

## 2018-04-17 NOTE — LETTER
My Depression Action Plan  Name: Frances Corea   Date of Birth 1960  Date: 4/17/2018    My doctor: Ayah Rojas   My clinic: [facility] ([facility]  [address] [place] [address] [address]  [place] [address]  [phone]          GREEN    ZONE   Good Control    What it looks like:     Things are going generally well. You have normal up s and down s. You may even feel depressed from time to time, but bad moods usually last less than a day.   What you need to do:  1. Continue to care for yourself (see self care plan)  2. Check your depression survival kit and update it as needed  3. Follow your physician s recommendations including any medication.  4. Do not stop taking medication unless you consult with your physician first.           YELLOW         ZONE Getting Worse    What it looks like:     Depression is starting to interfere with your life.     It may be hard to get out of bed; you may be starting to isolate yourself from others.    Symptoms of depression are starting to last most all day and this has happened for several days.     You may have suicidal thoughts but they are not constant.   What you need to do:     1. Call your care team, your response to treatment will improve if you keep your care team informed of your progress. Yellow periods are signs an adjustment may need to be made.     2. Continue your self-care, even if you have to fake it!    3. Talk to someone in your support network    4. Open up your depression survival kit           RED    ZONE Medical Alert - Get Help    What it looks like:     Depression is seriously interfering with your life.     You may experience these or other symptoms: You can t get out of bed most days, can t work or engage in other necessary activities, you have trouble taking care of basic hygiene, or basic responsibilities, thoughts of suicide or death that will not go away, self-injurious behavior.     What you need to do:  1. Call your care team and  request a same-day appointment. If they are not available (weekends or after hours) call your local crisis line, emergency room or 911.            Depression Self Care Plan / Survival Kit    Self-Care for Depression  Here s the deal. Your body and mind are really not as separate as most people think.  What you do and think affects how you feel and how you feel influences what you do and think. This means if you do things that people who feel good do, it will help you feel better.  Sometimes this is all it takes.  There is also a place for medication and therapy depending on how severe your depression is, so be sure to consult with your medical provider and/ or Behavioral Health Consultant if your symptoms are worsening or not improving.     In order to better manage my stress, I will:    Exercise  Get some form of exercise, every day. This will help reduce pain and release endorphins, the  feel good  chemicals in your brain. This is almost as good as taking antidepressants!  This is not the same as joining a gym and then never going! (they count on that by the way ) It can be as simple as just going for a walk or doing some gardening, anything that will get you moving.      Hygiene   Maintain good hygiene (Get out of bed in the morning, Make your bed, Brush your teeth, Take a shower, and Get dressed like you were going to work, even if you are unemployed).  If your clothes don't fit try to get ones that do.    Diet  I will strive to eat foods that are good for me, drink plenty of water, and avoid excessive sugar, caffeine, alcohol, and other mood-altering substances.  Some foods that are helpful in depression are: complex carbohydrates, B vitamins, flaxseed, fish or fish oil, fresh fruits and vegetables.    Psychotherapy  I agree to participate in Individual Therapy (if recommended).    Medication  If prescribed medications, I agree to take them.  Missing doses can result in serious side effects.  I understand that  drinking alcohol, or other illicit drug use, may cause potential side effects.  I will not stop my medication abruptly without first discussing it with my provider.    Staying Connected With Others  I will stay in touch with my friends, family members, and my primary care provider/team.    Use your imagination  Be creative.  We all have a creative side; it doesn t matter if it s oil painting, sand castles, or mud pies! This will also kick up the endorphins.    Witness Beauty  (AKA stop and smell the roses) Take a look outside, even in mid-winter. Notice colors, textures. Watch the squirrels and birds.     Service to others  Be of service to others.  There is always someone else in need.  By helping others we can  get out of ourselves  and remember the really important things.  This also provides opportunities for practicing all the other parts of the program.    Humor  Laugh and be silly!  Adjust your TV habits for less news and crime-drama and more comedy.    Control your stress  Try breathing deep, massage therapy, biofeedback, and meditation. Find time to relax each day.     My support system    Clinic Contact:  Phone number:    Contact 1:  Phone number:    Contact 2:  Phone number:    Uatsdin/:  Phone number:    Therapist:  Phone number:    Local crisis center:    Phone number:    Other community support:  Phone number:

## 2018-04-18 ASSESSMENT — ANXIETY QUESTIONNAIRES: GAD7 TOTAL SCORE: 7

## 2018-04-18 ASSESSMENT — PATIENT HEALTH QUESTIONNAIRE - PHQ9: SUM OF ALL RESPONSES TO PHQ QUESTIONS 1-9: 7

## 2018-04-24 DIAGNOSIS — I10 BENIGN ESSENTIAL HYPERTENSION: ICD-10-CM

## 2018-04-24 DIAGNOSIS — E03.9 ACQUIRED HYPOTHYROIDISM: ICD-10-CM

## 2018-04-24 DIAGNOSIS — Z51.81 ENCOUNTER FOR MONITORING STATIN THERAPY: ICD-10-CM

## 2018-04-24 DIAGNOSIS — E78.5 HYPERLIPIDEMIA WITH TARGET LDL LESS THAN 100: ICD-10-CM

## 2018-04-24 DIAGNOSIS — Z86.711 HISTORY OF PULMONARY EMBOLISM: ICD-10-CM

## 2018-04-24 DIAGNOSIS — R53.82 CHRONIC FATIGUE: ICD-10-CM

## 2018-04-24 DIAGNOSIS — E55.9 VITAMIN D DEFICIENCY: ICD-10-CM

## 2018-04-24 DIAGNOSIS — Z79.899 ENCOUNTER FOR MONITORING STATIN THERAPY: ICD-10-CM

## 2018-04-24 DIAGNOSIS — Z51.81 ENCOUNTER FOR THERAPEUTIC DRUG MONITORING: ICD-10-CM

## 2018-04-24 LAB
ALBUMIN SERPL-MCNC: 3.9 G/DL (ref 3.4–5)
ALP SERPL-CCNC: 107 U/L (ref 40–150)
ALT SERPL W P-5'-P-CCNC: 21 U/L (ref 0–50)
ANION GAP SERPL CALCULATED.3IONS-SCNC: 7 MMOL/L (ref 3–14)
AST SERPL W P-5'-P-CCNC: 13 U/L (ref 0–45)
BASOPHILS # BLD AUTO: 0 10E9/L (ref 0–0.2)
BASOPHILS NFR BLD AUTO: 0.2 %
BILIRUB DIRECT SERPL-MCNC: 0.1 MG/DL (ref 0–0.2)
BILIRUB SERPL-MCNC: 0.7 MG/DL (ref 0.2–1.3)
BUN SERPL-MCNC: 11 MG/DL (ref 7–30)
CALCIUM SERPL-MCNC: 9.4 MG/DL (ref 8.5–10.1)
CHLORIDE SERPL-SCNC: 106 MMOL/L (ref 94–109)
CHOLEST SERPL-MCNC: 152 MG/DL
CO2 SERPL-SCNC: 27 MMOL/L (ref 20–32)
CREAT SERPL-MCNC: 0.53 MG/DL (ref 0.52–1.04)
DIFFERENTIAL METHOD BLD: ABNORMAL
EOSINOPHIL # BLD AUTO: 0.1 10E9/L (ref 0–0.7)
EOSINOPHIL NFR BLD AUTO: 1.1 %
ERYTHROCYTE [DISTWIDTH] IN BLOOD BY AUTOMATED COUNT: 15.2 % (ref 10–15)
GFR SERPL CREATININE-BSD FRML MDRD: >90 ML/MIN/1.7M2
GLUCOSE SERPL-MCNC: 116 MG/DL (ref 70–99)
HCT VFR BLD AUTO: 43.8 % (ref 35–47)
HDLC SERPL-MCNC: 52 MG/DL
HGB BLD-MCNC: 13.6 G/DL (ref 11.7–15.7)
LDLC SERPL CALC-MCNC: 74 MG/DL
LITHIUM SERPL-SCNC: 0.81 MMOL/L (ref 0.6–1.2)
LYMPHOCYTES # BLD AUTO: 1.3 10E9/L (ref 0.8–5.3)
LYMPHOCYTES NFR BLD AUTO: 13.5 %
MCH RBC QN AUTO: 28.5 PG (ref 26.5–33)
MCHC RBC AUTO-ENTMCNC: 31.1 G/DL (ref 31.5–36.5)
MCV RBC AUTO: 92 FL (ref 78–100)
MONOCYTES # BLD AUTO: 0.5 10E9/L (ref 0–1.3)
MONOCYTES NFR BLD AUTO: 5.2 %
NEUTROPHILS # BLD AUTO: 7.4 10E9/L (ref 1.6–8.3)
NEUTROPHILS NFR BLD AUTO: 80 %
NONHDLC SERPL-MCNC: 100 MG/DL
PLATELET # BLD AUTO: 148 10E9/L (ref 150–450)
POTASSIUM SERPL-SCNC: 4.3 MMOL/L (ref 3.4–5.3)
PROT SERPL-MCNC: 7.5 G/DL (ref 6.8–8.8)
RBC # BLD AUTO: 4.77 10E12/L (ref 3.8–5.2)
SODIUM SERPL-SCNC: 140 MMOL/L (ref 133–144)
TRIGL SERPL-MCNC: 130 MG/DL
TSH SERPL DL<=0.005 MIU/L-ACNC: 1.82 MU/L (ref 0.4–4)
WBC # BLD AUTO: 9.3 10E9/L (ref 4–11)

## 2018-04-24 PROCEDURE — 99000 SPECIMEN HANDLING OFFICE-LAB: CPT | Performed by: NURSE PRACTITIONER

## 2018-04-24 PROCEDURE — 84443 ASSAY THYROID STIM HORMONE: CPT | Mod: ZL | Performed by: NURSE PRACTITIONER

## 2018-04-24 PROCEDURE — 36415 COLL VENOUS BLD VENIPUNCTURE: CPT | Mod: ZL | Performed by: NURSE PRACTITIONER

## 2018-04-24 PROCEDURE — 82306 VITAMIN D 25 HYDROXY: CPT | Mod: ZL | Performed by: NURSE PRACTITIONER

## 2018-04-24 PROCEDURE — 82248 BILIRUBIN DIRECT: CPT | Mod: ZL | Performed by: NURSE PRACTITIONER

## 2018-04-24 PROCEDURE — 85025 COMPLETE CBC W/AUTO DIFF WBC: CPT | Mod: ZL | Performed by: NURSE PRACTITIONER

## 2018-04-24 PROCEDURE — 80061 LIPID PANEL: CPT | Mod: ZL | Performed by: NURSE PRACTITIONER

## 2018-04-24 PROCEDURE — 80178 ASSAY OF LITHIUM: CPT | Mod: ZL | Performed by: NURSE PRACTITIONER

## 2018-04-24 PROCEDURE — 80053 COMPREHEN METABOLIC PANEL: CPT | Mod: ZL | Performed by: NURSE PRACTITIONER

## 2018-04-24 RX ORDER — SIMVASTATIN 40 MG
40 TABLET ORAL AT BEDTIME
Qty: 90 TABLET | Refills: 1 | Status: SHIPPED | OUTPATIENT
Start: 2018-04-24 | End: 2018-10-19

## 2018-04-25 NOTE — PROGRESS NOTES
Lipids are higher  Statin increased - printed - unsure of pharmacy.  If it did not print - pls le me know.   Low fat diet  Fish oil 3 per day      TSH normal  BMP normal  CBC normal    D is pending    FU 6 months    Ayah JOY  [phone]

## 2018-04-26 LAB — DEPRECATED CALCIDIOL+CALCIFEROL SERPL-MC: 42 UG/L (ref 20–75)

## 2018-04-30 ENCOUNTER — ANTICOAGULATION THERAPY VISIT (OUTPATIENT)
Dept: ANTICOAGULATION | Facility: OTHER | Age: 58
End: 2018-04-30
Payer: COMMERCIAL

## 2018-04-30 DIAGNOSIS — Z86.718 HISTORY OF DEEP VENOUS THROMBOSIS: ICD-10-CM

## 2018-04-30 DIAGNOSIS — Z79.01 LONG TERM CURRENT USE OF ANTICOAGULANT THERAPY: ICD-10-CM

## 2018-04-30 DIAGNOSIS — I26.99 PULMONARY EMBOLISM AND INFARCTION (H): ICD-10-CM

## 2018-04-30 DIAGNOSIS — Z86.711 HISTORY OF PULMONARY EMBOLISM: ICD-10-CM

## 2018-04-30 LAB — INR BLD: 3.5 (ref 0.86–1.14)

## 2018-04-30 PROCEDURE — 36416 COLLJ CAPILLARY BLOOD SPEC: CPT | Mod: ZL | Performed by: NURSE PRACTITIONER

## 2018-04-30 PROCEDURE — 85610 PROTHROMBIN TIME: CPT | Mod: QW,ZL | Performed by: NURSE PRACTITIONER

## 2018-04-30 NOTE — MR AVS SNAPSHOT
Frances Corea   4/30/2018   Anticoagulation Therapy Visit    Description:  57 year old female   Provider:  Ayah Rojas NP   Department:  [facility] Anti Coagulation           INR as of 4/30/2018     Today's INR 3.5!      Anticoagulation Summary as of 4/30/2018     INR goal 2.0-3.0   Today's INR 3.5!   Full instructions 4/30: 3 mg; Otherwise 6 mg on Mon, Wed, Fri; 9 mg all other days   Next INR check 5/29/2018    Indications   History of deep venous thrombosis [Z86.718]  History of pulmonary embolism [Z86.711]  Long-term (current) use of anticoagulants [Z79.01] (Resolved) [Z79.01]         Your next Anticoagulation Clinic appointment(s)     May 29, 2018  2:00 PM CDT   Anticoagulation Visit with HC ANTI COAGULATION   [facility] ([facility]    [address] [place] [address]   [phone]              April 2018 Details    Sun Mon Tue Wed Thu Fri Sat     1               2               3               4               5               6               7                 8               9               10               11               12               13               14                 15               16               17               18               19               20               21                 22               23               24               25               26               27               28                 29               30      3 mg   See details            Date Details   04/30 This INR check               How to take your warfarin dose     To take:  3 mg Take 1 of the 3 mg tablets.           May 2018 Details    Sun Mon Tue Wed Thu Fri Sat       1      9 mg         2      6 mg         3      9 mg         4      6 mg         5      9 mg           6      9 mg         7      6 mg         8      9 mg         9      6 mg         10      9 mg         11      6 mg         12      9 mg           13      9 mg         14      6 mg         15      9 mg         16      6  mg         17      9 mg         18      6 mg         19      9 mg           20      9 mg         21      6 mg         22      9 mg         23      6 mg         24      9 mg         25      6 mg         26      9 mg           27      9 mg         28      6 mg         29            30               31                  Date Details   No additional details    Date of next INR:  5/29/2018         How to take your warfarin dose     To take:  6 mg Take 2 of the 3 mg tablets.    To take:  9 mg Take 3 of the 3 mg tablets.

## 2018-04-30 NOTE — PROGRESS NOTES
ANTICOAGULATION FOLLOW-UP CLINIC VISIT    Patient Name:  Frances Corea  Date:  4/30/2018  Contact Type     Telephone    message left on her mother, Frances, phone    SUBJECTIVE:     Patient Findings     Comments INR done by lab. Call placed to patient mother and message left re: INR result, warfarin dosing and INR recheck date. She is to call warfarin clinic if patient has any bleeding/bruising, changes in diet/meds/activity or questions.            OBJECTIVE    INR Point of Care   Date Value Ref Range Status   04/30/2018 3.5 (H) 0.86 - 1.14 Final     Comment:     This test is intended for monitoring Coumadin therapy.  Results are not   accurate in patients with prolonged INR due to factor deficiency.         ASSESSMENT / PLAN  INR assessment SUPRA    Recheck INR In: 4 WEEKS    INR Location Clinic      Anticoagulation Summary as of 4/30/2018     INR goal 2.0-3.0   Today's INR 3.5!   Maintenance plan 6 mg (3 mg x 2) on Mon, Wed, Fri; 9 mg (3 mg x 3) all other days   Full instructions 4/30: 3 mg; Otherwise 6 mg on Mon, Wed, Fri; 9 mg all other days   Weekly total 54 mg   Plan last modified Stefany Huynh RN (12/26/2017)   Next INR check 5/29/2018   Target end date     Indications   History of deep venous thrombosis [Z86.718]  History of pulmonary embolism [Z86.711]  Long-term (current) use of anticoagulants [Z79.01] (Resolved) [Z79.01]         Anticoagulation Episode Summary     INR check location     Preferred lab     Send INR reminders to  ANTICOAG POOL    Comments       Anticoagulation Care Providers     Provider Role Specialty Phone number    Ayah Rojas NP [facility] [phone]            See the Encounter Report to view Anticoagulation Flowsheet and Dosing Calendar (Go to Encounters tab in chart review, and find the Anticoagulation Therapy Visit)        Stefany Huynh, RN

## 2018-05-21 ENCOUNTER — HEALTH MAINTENANCE LETTER (OUTPATIENT)
Age: 58
End: 2018-05-21

## 2018-05-29 ENCOUNTER — ANTICOAGULATION THERAPY VISIT (OUTPATIENT)
Dept: ANTICOAGULATION | Facility: OTHER | Age: 58
End: 2018-05-29
Attending: NURSE PRACTITIONER
Payer: MEDICARE

## 2018-05-29 DIAGNOSIS — I26.99 PULMONARY EMBOLISM AND INFARCTION (H): ICD-10-CM

## 2018-05-29 DIAGNOSIS — Z86.718 HISTORY OF DEEP VENOUS THROMBOSIS: ICD-10-CM

## 2018-05-29 DIAGNOSIS — Z79.01 LONG TERM CURRENT USE OF ANTICOAGULANT THERAPY: ICD-10-CM

## 2018-05-29 DIAGNOSIS — Z86.711 HISTORY OF PULMONARY EMBOLISM: ICD-10-CM

## 2018-05-29 LAB — INR BLD: 5.3 (ref 0.86–1.14)

## 2018-05-29 PROCEDURE — 85610 PROTHROMBIN TIME: CPT | Mod: QW,ZL | Performed by: NURSE PRACTITIONER

## 2018-05-29 PROCEDURE — 36416 COLLJ CAPILLARY BLOOD SPEC: CPT | Mod: ZL | Performed by: NURSE PRACTITIONER

## 2018-05-29 NOTE — MR AVS SNAPSHOT
Frances Corea   5/29/2018 2:00 PM   Anticoagulation Therapy Visit    Description:  57 year old female   Provider:   ANTI COAGULATION   Department:  [facility] Anti Coagulation           INR as of 5/29/2018     Today's INR 5.3!      Anticoagulation Summary as of 5/29/2018     INR goal 2.0-3.0   Today's INR 5.3!   Full warfarin instructions 5/29: Hold; 5/30: 3 mg; Otherwise 6 mg on Mon, Wed, Fri; 9 mg all other days   Next INR check 6/1/2018    Indications   History of deep venous thrombosis [Z86.718]  History of pulmonary embolism [Z86.711]  Long-term (current) use of anticoagulants [Z79.01] (Resolved) [Z79.01]         Your next Anticoagulation Clinic appointment(s)     Jun 01, 2018  2:15 PM CDT   Anticoagulation Visit with  ANTI COAGULATION   [facility] ([facility] [place] )    [address]  [place] [address]   [phone]              May 2018 Details    Sun Mon Tue Wed Thu Fri Sat       1               2               3               4               5                 6               7               8               9               10               11               12                 13               14               15               16               17               18               19                 20               21               22               23               24               25               26                 27               28               29      Hold   See details      30      3 mg         31      9 mg            Date Details   05/29 This INR check               How to take your warfarin dose     To take:  3 mg Take 1 of the 3 mg tablets.    To take:  9 mg Take 3 of the 3 mg tablets.    Hold Do not take your warfarin dose. See the Details table to the right for additional instructions.                June 2018 Details    Sun Mon Tue Wed Thu Fri Sat          1            2                 3               4               5               6               7                8               9                 10               11               12               13               14               15               16                 17               18               19               20               21               22               23                 24               25               26               27               28               29               30                Date Details   No additional details    Date of next INR:  6/1/2018         How to take your warfarin dose     To take:  6 mg Take 2 of the 3 mg tablets.

## 2018-05-29 NOTE — PROGRESS NOTES
ANTICOAGULATION FOLLOW-UP CLINIC VISIT    Patient Name:  Frances Corea  Date:  5/29/2018  Contact Type:  Telephone/ spoke to patient mother and caregiver, Frances    SUBJECTIVE:     Patient Findings     Positives Unexplained INR or factor level change    Comments INR done by lab. Call placed to patient mother's  phone and spoke to her re: INR result, warfarin dosing and INR recheck date. Per patient mother, vit K intake has been drastically reduced. We discussed that vit K should be increased at least back to what it was.  We discussed that if any uncontrolled bleeding to go to ER. Patient mother verbalized understanding and has no questions.           OBJECTIVE    INR Point of Care   Date Value Ref Range Status   05/29/2018 5.3 (HH) 0.86 - 1.14 Final     Comment:     This test is intended for monitoring Coumadin therapy.  Results are not   accurate in patients with prolonged INR due to factor deficiency.  Critical Value called to and read back by  PAT MCDONOUGH AT 1415 ON 5/29/2018 BY BERNADETTE TSAI         ASSESSMENT / PLAN  INR assessment SUPRA    Recheck INR In: 3 DAYS    INR Location Clinic      Anticoagulation Summary as of 5/29/2018     INR goal 2.0-3.0   Today's INR 5.3!   Warfarin maintenance plan 6 mg (3 mg x 2) on Mon, Wed, Fri; 9 mg (3 mg x 3) all other days   Full warfarin instructions 5/29: Hold; 5/30: 3 mg; Otherwise 6 mg on Mon, Wed, Fri; 9 mg all other days   Weekly warfarin total 54 mg   Plan last modified Pat Huynh RN (12/26/2017)   Next INR check 6/1/2018   Target end date     Indications   History of deep venous thrombosis [Z86.718]  History of pulmonary embolism [Z86.711]  Long-term (current) use of anticoagulants [Z79.01] (Resolved) [Z79.01]         Anticoagulation Episode Summary     INR check location     Preferred lab     Send INR reminders to  ANTICOAG POOL    Comments       Anticoagulation Care Providers     Provider Role Specialty Phone number    Ayah Rojas NP Responsible Family  Practice [phone]            See the Encounter Report to view Anticoagulation Flowsheet and Dosing Calendar (Go to Encounters tab in chart review, and find the Anticoagulation Therapy Visit)        Stefany Huynh RN

## 2018-06-01 ENCOUNTER — ANTICOAGULATION THERAPY VISIT (OUTPATIENT)
Dept: ANTICOAGULATION | Facility: OTHER | Age: 58
End: 2018-06-01
Attending: NURSE PRACTITIONER
Payer: MEDICARE

## 2018-06-01 DIAGNOSIS — Z86.718 HISTORY OF DEEP VENOUS THROMBOSIS: ICD-10-CM

## 2018-06-01 DIAGNOSIS — Z86.711 HISTORY OF PULMONARY EMBOLISM: ICD-10-CM

## 2018-06-01 DIAGNOSIS — I26.99 PULMONARY EMBOLISM AND INFARCTION (H): ICD-10-CM

## 2018-06-01 DIAGNOSIS — Z79.01 LONG TERM CURRENT USE OF ANTICOAGULANT THERAPY: ICD-10-CM

## 2018-06-01 LAB — INR BLD: 1.8 (ref 0.86–1.14)

## 2018-06-01 PROCEDURE — 85610 PROTHROMBIN TIME: CPT | Mod: QW,ZL | Performed by: NURSE PRACTITIONER

## 2018-06-01 PROCEDURE — 36416 COLLJ CAPILLARY BLOOD SPEC: CPT | Mod: ZL | Performed by: NURSE PRACTITIONER

## 2018-06-01 NOTE — PROGRESS NOTES
ANTICOAGULATION FOLLOW-UP CLINIC VISIT    Patient Name:  Frances Corea  Date:  6/1/2018  Contact Type:  Telephone    SUBJECTIVE:     Patient Findings     Positives No Problem Findings    Comments LM to notify AC clinic of any bleeding/bruising, any changes in meds/diet/activity, or with any questions or concerns.           OBJECTIVE    INR Point of Care   Date Value Ref Range Status   06/01/2018 1.8 (H) 0.86 - 1.14 Final     Comment:     This test is intended for monitoring Coumadin therapy.  Results are not   accurate in patients with prolonged INR due to factor deficiency.         ASSESSMENT / PLAN  INR assessment SUB    Recheck INR In: 10 DAYS    INR Location Clinic      Anticoagulation Summary as of 6/1/2018     INR goal 2.0-3.0   Today's INR 1.8!   Warfarin maintenance plan 6 mg (3 mg x 2) on Mon, Wed, Fri; 9 mg (3 mg x 3) all other days   Full warfarin instructions 6 mg on Mon, Wed, Fri; 9 mg all other days   Weekly warfarin total 54 mg   No change documented Brigido Thomson RN   Plan last modified Stefany Huynh RN (12/26/2017)   Next INR check 6/11/2018   Target end date     Indications   History of deep venous thrombosis [Z86.718]  History of pulmonary embolism [Z86.711]  Long-term (current) use of anticoagulants [Z79.01] (Resolved) [Z79.01]         Anticoagulation Episode Summary     INR check location     Preferred lab     Send INR reminders to [facility] ANTICOAG POOL    Comments       Anticoagulation Care Providers     Provider Role Specialty Phone number    Ayah Rojas, JACK [facility] [phone]            See the Encounter Report to view Anticoagulation Flowsheet and Dosing Calendar (Go to Encounters tab in chart review, and find the Anticoagulation Therapy Visit)    LM with INR result, warfarin dosing, and INR recheck date.    BRIGIDO THOMSON, RN

## 2018-06-11 ENCOUNTER — ANTICOAGULATION THERAPY VISIT (OUTPATIENT)
Dept: ANTICOAGULATION | Facility: OTHER | Age: 58
End: 2018-06-11
Attending: NURSE PRACTITIONER
Payer: MEDICARE

## 2018-06-11 DIAGNOSIS — Z79.01 LONG TERM CURRENT USE OF ANTICOAGULANT THERAPY: ICD-10-CM

## 2018-06-11 DIAGNOSIS — Z86.718 HISTORY OF DEEP VENOUS THROMBOSIS: ICD-10-CM

## 2018-06-11 DIAGNOSIS — Z86.711 HISTORY OF PULMONARY EMBOLISM: ICD-10-CM

## 2018-06-11 DIAGNOSIS — I26.99 PULMONARY EMBOLISM AND INFARCTION (H): ICD-10-CM

## 2018-06-11 LAB — INR BLD: 3.6 (ref 0.86–1.14)

## 2018-06-11 PROCEDURE — 85610 PROTHROMBIN TIME: CPT | Mod: QW,ZL | Performed by: NURSE PRACTITIONER

## 2018-06-11 PROCEDURE — 36416 COLLJ CAPILLARY BLOOD SPEC: CPT | Mod: ZL | Performed by: NURSE PRACTITIONER

## 2018-06-11 NOTE — MR AVS SNAPSHOT
Frances Corea   6/11/2018 1:15 PM   Anticoagulation Therapy Visit    Description:  57 year old female   Provider:   ANTI COAGULATION   Department:  [facility] Anti Coagulation           INR as of 6/11/2018     Today's INR 3.6!      Anticoagulation Summary as of 6/11/2018     INR goal 2.0-3.0   Today's INR 3.6!   Full warfarin instructions 6/11: 3 mg; Otherwise 9 mg on Mon, Wed, Fri; 6 mg all other days   Next INR check 6/22/2018    Indications   History of deep venous thrombosis [Z86.718]  History of pulmonary embolism [Z86.711]  Long-term (current) use of anticoagulants [Z79.01] (Resolved) [Z79.01]         Your next Anticoagulation Clinic appointment(s)     Jun 22, 2018  1:00 PM CDT   Anticoagulation Visit with  ANTI COAGULATION   [facility] ([facility] [place] )    [address]  [place] [address]   [phone]              June 2018 Details    Sun Mon Tue Wed Thu Fri Sat          1               2                 3               4               5               6               7               8               9                 10               11      3 mg   See details      12      6 mg         13      9 mg         14      6 mg         15      9 mg         16      6 mg           17      6 mg         18      9 mg         19      6 mg         20      9 mg         21      6 mg         22            23                 24               25               26               27               28               29               30                Date Details   06/11 This INR check       Date of next INR:  6/22/2018         How to take your warfarin dose     To take:  3 mg Take 1 of the 3 mg tablets.    To take:  6 mg Take 2 of the 3 mg tablets.    To take:  9 mg Take 3 of the 3 mg tablets.

## 2018-06-11 NOTE — PROGRESS NOTES
ANTICOAGULATION FOLLOW-UP CLINIC VISIT    Patient Name:  Frances Corea  Date:  6/11/2018  Contact Type:  Telephone/ message left on mother's home phone voicemail    SUBJECTIVE:     Patient Findings     Comments INR done by lab. Call placed to patient mother's phone and message left re: INR result, warfarin dosing and INR recheck date. She is to notify warfarin clinic if she has any bleeding/bruising, changes in diet/meds/activity or questions.            OBJECTIVE    INR Point of Care   Date Value Ref Range Status   06/11/2018 3.6 (H) 0.86 - 1.14 Final     Comment:     This test is intended for monitoring Coumadin therapy.  Results are not   accurate in patients with prolonged INR due to factor deficiency.         ASSESSMENT / PLAN  INR assessment SUPRA    Recheck INR In: 10 DAYS    INR Location Clinic      Anticoagulation Summary as of 6/11/2018     INR goal 2.0-3.0   Today's INR 3.6!   Warfarin maintenance plan 9 mg (3 mg x 3) on Mon, Wed, Fri; 6 mg (3 mg x 2) all other days   Full warfarin instructions 6/11: 3 mg; Otherwise 9 mg on Mon, Wed, Fri; 6 mg all other days   Weekly warfarin total 51 mg   Plan last modified Stefany Huynh RN (6/11/2018)   Next INR check 6/22/2018   Target end date     Indications   History of deep venous thrombosis [Z86.718]  History of pulmonary embolism [Z86.711]  Long-term (current) use of anticoagulants [Z79.01] (Resolved) [Z79.01]         Anticoagulation Episode Summary     INR check location     Preferred lab     Send INR reminders to  ANTICOAG POOL    Comments       Anticoagulation Care Providers     Provider Role Specialty Phone number    Ayah Rojas NP [facility] [phone]            See the Encounter Report to view Anticoagulation Flowsheet and Dosing Calendar (Go to Encounters tab in chart review, and find the Anticoagulation Therapy Visit)        Stefany Huynh, RN

## 2018-06-18 DIAGNOSIS — M25.562 LEFT KNEE PAIN, UNSPECIFIED CHRONICITY: ICD-10-CM

## 2018-06-18 NOTE — TELEPHONE ENCOUNTER
Controlled Substance Refill Request for Tramadol  Problem List Complete:  No     PROVIDER TO CONSIDER COMPLETION OF PROBLEM LIST AND OVERVIEW/CONTROLLED SUBSTANCE AGREEMENT    Last Written Prescription Date:  12/26/17  Last Fill Quantity: 60,   # refills: 5    Last Office Visit with G primary care provider: 4/17/18    Future Office visit:     Controlled substance agreement on file: No.     Processing:  Fax Rx to [facility] pharmacy

## 2018-06-19 RX ORDER — TRAMADOL HYDROCHLORIDE 50 MG/1
TABLET ORAL
Qty: 60 TABLET | Refills: 0 | Status: SHIPPED | OUTPATIENT
Start: 2018-06-19 | End: 2018-07-20

## 2018-06-20 ENCOUNTER — RADIANT APPOINTMENT (OUTPATIENT)
Dept: MAMMOGRAPHY | Facility: OTHER | Age: 58
End: 2018-06-20
Attending: NURSE PRACTITIONER
Payer: MEDICARE

## 2018-06-20 DIAGNOSIS — Z12.31 ENCOUNTER FOR SCREENING MAMMOGRAM FOR BREAST CANCER: ICD-10-CM

## 2018-06-20 PROCEDURE — 77067 SCR MAMMO BI INCL CAD: CPT | Mod: TC

## 2018-06-21 ENCOUNTER — ANTICOAGULATION THERAPY VISIT (OUTPATIENT)
Dept: ANTICOAGULATION | Facility: OTHER | Age: 58
End: 2018-06-21
Attending: NURSE PRACTITIONER
Payer: MEDICARE

## 2018-06-21 DIAGNOSIS — I26.99 PULMONARY EMBOLISM AND INFARCTION (H): ICD-10-CM

## 2018-06-21 DIAGNOSIS — Z86.711 HISTORY OF PULMONARY EMBOLISM: ICD-10-CM

## 2018-06-21 DIAGNOSIS — Z86.718 HISTORY OF DEEP VENOUS THROMBOSIS: ICD-10-CM

## 2018-06-21 DIAGNOSIS — Z79.01 LONG TERM CURRENT USE OF ANTICOAGULANT THERAPY: ICD-10-CM

## 2018-06-21 LAB — INR BLD: 2.8 (ref 0.86–1.14)

## 2018-06-21 PROCEDURE — 36416 COLLJ CAPILLARY BLOOD SPEC: CPT | Mod: ZL | Performed by: NURSE PRACTITIONER

## 2018-06-21 PROCEDURE — 85610 PROTHROMBIN TIME: CPT | Mod: QW,ZL | Performed by: NURSE PRACTITIONER

## 2018-06-21 NOTE — MR AVS SNAPSHOT
Frances Corea   6/21/2018 1:00 PM   Anticoagulation Therapy Visit    Description:  57 year old female   Provider:   ANTI COAGULATION   Department:  Hc Anti Coagulation           INR as of 6/21/2018     Today's INR 2.8      Anticoagulation Summary as of 6/21/2018     INR goal 2.0-3.0   Today's INR 2.8   Full warfarin instructions 9 mg on Mon, Wed, Fri; 6 mg all other days   Next INR check 7/12/2018    Indications   History of deep venous thrombosis [Z86.718]  History of pulmonary embolism [Z86.711]  Long-term (current) use of anticoagulants [Z79.01] (Resolved) [Z79.01]         Your next Anticoagulation Clinic appointment(s)     Jul 12, 2018  1:00 PM CDT   Anticoagulation Visit with  ANTI COAGULATION   [facility] ([facility] )    [address] [place] [address]   [phone]              June 2018 Details    Sun Mon Tue Wed Thu Fri Sat          1               2                 3               4               5               6               7               8               9                 10               11               12               13               14               15               16                 17               18               19               20               21      6 mg   See details      22      9 mg         23      6 mg           24      6 mg         25      9 mg         26      6 mg         27      9 mg         28      6 mg         29      9 mg         30      6 mg          Date Details   06/21 This INR check               How to take your warfarin dose     To take:  6 mg Take 2 of the 3 mg tablets.    To take:  9 mg Take 3 of the 3 mg tablets.           July 2018 Details    Sun Mon Tue Wed Thu Fri Sat     1      6 mg         2      9 mg         3      6 mg         4      9 mg         5      6 mg         6      9 mg         7      6 mg           8      6 mg         9      9 mg         10      6 mg         11      9 mg         12            13                14                 15               16               17               18               19               20               21                 22               23               24               25               26               27               28                 29               30               31                    Date Details   No additional details    Date of next INR:  7/12/2018         How to take your warfarin dose     To take:  6 mg Take 2 of the 3 mg tablets.    To take:  9 mg Take 3 of the 3 mg tablets.

## 2018-06-21 NOTE — PROGRESS NOTES
ANTICOAGULATION FOLLOW-UP CLINIC VISIT    Patient Name:  Frances Corea  Date:  6/21/2018  Contact Type:  Telephone/ message left on patient's mothers home phone voicemail    SUBJECTIVE:     Patient Findings     Positives No Problem Findings    Comments INR done by lab. Call placed to patient mother, Frances (patient lives with her mother and is disabled).  Message left on home phone voicemail re: INR result, warfarin dosing and INR recheck date. Frances to call warfarin clinic if patient has any bleeding/bruising, changes in diet/meds/activity or questions           OBJECTIVE    INR Point of Care   Date Value Ref Range Status   06/21/2018 2.8 (H) 0.86 - 1.14 Final     Comment:     This test is intended for monitoring Coumadin therapy.  Results are not   accurate in patients with prolonged INR due to factor deficiency.         ASSESSMENT / PLAN  INR assessment THER    Recheck INR In: 3 WEEKS    INR Location Clinic      Anticoagulation Summary as of 6/21/2018     INR goal 2.0-3.0   Today's INR 2.8   Warfarin maintenance plan 9 mg (3 mg x 3) on Mon, Wed, Fri; 6 mg (3 mg x 2) all other days   Full warfarin instructions 9 mg on Mon, Wed, Fri; 6 mg all other days   Weekly warfarin total 51 mg   No change documented Stefany Huynh, RN   Plan last modified Stefany Huynh, RN (6/11/2018)   Next INR check 7/12/2018   Target end date     Indications   History of deep venous thrombosis [Z86.718]  History of pulmonary embolism [Z86.711]  Long-term (current) use of anticoagulants [Z79.01] (Resolved) [Z79.01]         Anticoagulation Episode Summary     INR check location     Preferred lab     Send INR reminders to  ANTICOAG POOL    Comments       Anticoagulation Care Providers     Provider Role Specialty Phone number    Ayah Rojas NP Responsible Family Practice [phone]            See the Encounter Report to view Anticoagulation Flowsheet and Dosing Calendar (Go to Encounters tab in chart review, and find the  Anticoagulation Therapy Visit)        Stefany Huynh RN

## 2018-06-30 ENCOUNTER — MEDICAL CORRESPONDENCE (OUTPATIENT)
Dept: HEALTH INFORMATION MANAGEMENT | Facility: CLINIC | Age: 58
End: 2018-06-30

## 2018-07-05 DIAGNOSIS — I10 BENIGN ESSENTIAL HYPERTENSION: ICD-10-CM

## 2018-07-06 RX ORDER — LISINOPRIL 10 MG/1
TABLET ORAL
Qty: 90 TABLET | Refills: 2 | Status: SHIPPED | OUTPATIENT
Start: 2018-07-06 | End: 2019-03-20 | Stop reason: ALTCHOICE

## 2018-07-06 NOTE — TELEPHONE ENCOUNTER
lisinopril      Last Written Prescription Date:  12/26/17  Last Fill Quantity: 90,   # refills: 1  Last Office Visit: 4/17/18  Future Office visit:   none

## 2018-07-12 ENCOUNTER — ANTICOAGULATION THERAPY VISIT (OUTPATIENT)
Dept: ANTICOAGULATION | Facility: OTHER | Age: 58
End: 2018-07-12
Attending: NURSE PRACTITIONER
Payer: MEDICARE

## 2018-07-12 DIAGNOSIS — I26.99 PULMONARY EMBOLISM AND INFARCTION (H): ICD-10-CM

## 2018-07-12 DIAGNOSIS — Z79.01 LONG TERM CURRENT USE OF ANTICOAGULANT THERAPY: ICD-10-CM

## 2018-07-12 DIAGNOSIS — Z86.711 HISTORY OF PULMONARY EMBOLISM: ICD-10-CM

## 2018-07-12 DIAGNOSIS — Z86.718 HISTORY OF DEEP VENOUS THROMBOSIS: ICD-10-CM

## 2018-07-12 LAB — INR BLD: 2.8 (ref 0.86–1.14)

## 2018-07-12 PROCEDURE — 36416 COLLJ CAPILLARY BLOOD SPEC: CPT | Mod: ZL | Performed by: NURSE PRACTITIONER

## 2018-07-12 PROCEDURE — 85610 PROTHROMBIN TIME: CPT | Mod: QW,ZL | Performed by: NURSE PRACTITIONER

## 2018-07-12 NOTE — MR AVS SNAPSHOT
Frances Corea   7/12/2018 1:00 PM   Anticoagulation Therapy Visit    Description:  57 year old female   Provider:   ANTI COAGULATION   Department:  Hc Anti Coagulation           INR as of 7/12/2018     Today's INR 2.8      Anticoagulation Summary as of 7/12/2018     INR goal 2.0-3.0   Today's INR 2.8   Full warfarin instructions 9 mg on Mon, Wed, Fri; 6 mg all other days   Next INR check 8/23/2018    Indications   History of deep venous thrombosis [Z86.718]  History of pulmonary embolism [Z86.711]  Long-term (current) use of anticoagulants [Z79.01] (Resolved) [Z79.01]         Your next Anticoagulation Clinic appointment(s)     Aug 23, 2018  1:00 PM CDT   Anticoagulation Visit with  ANTI COAGULATION   [facility] ([facility] )    [address] [place] [address]   [phone]              July 2018 Details    Sun Mon Tue Wed Thu Fri Sat     1               2               3               4               5               6               7                 8               9               10               11               12      6 mg   See details      13      9 mg         14      6 mg           15      6 mg         16      9 mg         17      6 mg         18      9 mg         19      6 mg         20      9 mg         21      6 mg           22      6 mg         23      9 mg         24      6 mg         25      9 mg         26      6 mg         27      9 mg         28      6 mg           29      6 mg         30      9 mg         31      6 mg              Date Details   07/12 This INR check               How to take your warfarin dose     To take:  6 mg Take 2 of the 3 mg tablets.    To take:  9 mg Take 3 of the 3 mg tablets.           August 2018 Details    Sun Mon Tue Wed Thu Fri Sat        1      9 mg         2      6 mg         3      9 mg         4      6 mg           5      6 mg         6      9 mg         7      6 mg         8      9 mg         9      6 mg          10      9 mg         11      6 mg           12      6 mg         13      9 mg         14      6 mg         15      9 mg         16      6 mg         17      9 mg         18      6 mg           19      6 mg         20      9 mg         21      6 mg         22      9 mg         23            24               25                 26               27               28               29               30               31                 Date Details   No additional details    Date of next INR:  8/23/2018         How to take your warfarin dose     To take:  6 mg Take 2 of the 3 mg tablets.    To take:  9 mg Take 3 of the 3 mg tablets.

## 2018-07-12 NOTE — PROGRESS NOTES
ANTICOAGULATION FOLLOW-UP CLINIC VISIT    Patient Name:  Frances Corea  Date:  7/12/2018  Contact Type:  Telephone/ message left on her mother's voicemail    SUBJECTIVE:     Patient Findings     Positives No Problem Findings    Comments INR done by lab. Call placed to patient mother and message left re: INR result, warfarin dosing and INR recheck date. Patient mother to call if patient has had any bleeding/bruising, changes in diet/meds/activity or questions.            OBJECTIVE    INR Point of Care   Date Value Ref Range Status   07/12/2018 2.8 (H) 0.86 - 1.14 Final     Comment:     This test is intended for monitoring Coumadin therapy.  Results are not   accurate in patients with prolonged INR due to factor deficiency.         ASSESSMENT / PLAN  INR assessment THER    Recheck INR In: 6 WEEKS    INR Location Clinic      Anticoagulation Summary as of 7/12/2018     INR goal 2.0-3.0   Today's INR 2.8   Warfarin maintenance plan 9 mg (3 mg x 3) on Mon, Wed, Fri; 6 mg (3 mg x 2) all other days   Full warfarin instructions 9 mg on Mon, Wed, Fri; 6 mg all other days   Weekly warfarin total 51 mg   No change documented Stefany Huynh RN   Plan last modified Stefany Huynh RN (6/11/2018)   Next INR check 8/23/2018   Target end date     Indications   History of deep venous thrombosis [Z86.718]  History of pulmonary embolism [Z86.711]  Long-term (current) use of anticoagulants [Z79.01] (Resolved) [Z79.01]         Anticoagulation Episode Summary     INR check location     Preferred lab     Send INR reminders to  ANTICOAG POOL    Comments       Anticoagulation Care Providers     Provider Role Specialty Phone number    Ayah Rojas NP [facility] [phone]            See the Encounter Report to view Anticoagulation Flowsheet and Dosing Calendar (Go to Encounters tab in chart review, and find the Anticoagulation Therapy Visit)        Stefany Huyhn RN

## 2018-07-16 ENCOUNTER — TRANSFERRED RECORDS (OUTPATIENT)
Dept: HEALTH INFORMATION MANAGEMENT | Facility: CLINIC | Age: 58
End: 2018-07-16

## 2018-07-20 DIAGNOSIS — M25.562 LEFT KNEE PAIN, UNSPECIFIED CHRONICITY: ICD-10-CM

## 2018-07-20 RX ORDER — TRAMADOL HYDROCHLORIDE 50 MG/1
TABLET ORAL
Qty: 60 TABLET | Refills: 0 | Status: SHIPPED | OUTPATIENT
Start: 2018-07-20 | End: 2019-01-17

## 2018-07-20 NOTE — TELEPHONE ENCOUNTER
Controlled Substance Refill Request for Ultram  Problem List Complete:  No     PROVIDER TO CONSIDER COMPLETION OF PROBLEM LIST AND OVERVIEW/CONTROLLED SUBSTANCE AGREEMENT    Last Written Prescription Date:  6.19.18  Last Fill Quantity: 60,   # refills: 0    Last Office Visit with [facility] primary care provider: 4.17.18    Future Office visit:   Next 5 appointments (look out 90 days)     Aug 07, 2018  1:15 PM CDT   (Arrive by 1:00 PM)   SHORT with Ayah Rojas NP   [facility] ([facility] )    [address]  [facility] [address]   [phone]                  Controlled substance agreement on file: No.     Processing:  Fax Rx to [facility] pharmacy   checked in past 3 months?  No, route to RN

## 2018-08-13 ENCOUNTER — HOSPITAL ENCOUNTER (EMERGENCY)
Facility: HOSPITAL | Age: 58
Discharge: ANOTHER HOSPITAL | End: 2018-08-14
Attending: FAMILY MEDICINE | Admitting: FAMILY MEDICINE
Payer: MEDICARE

## 2018-08-13 DIAGNOSIS — F20.9 SCHIZOPHRENIA, UNSPECIFIED TYPE (H): ICD-10-CM

## 2018-08-13 LAB
ALBUMIN SERPL-MCNC: 3.8 G/DL (ref 3.4–5)
ALBUMIN UR-MCNC: 30 MG/DL
ALP SERPL-CCNC: 88 U/L (ref 40–150)
ALT SERPL W P-5'-P-CCNC: 21 U/L (ref 0–50)
AMPHETAMINES UR QL SCN: NEGATIVE
ANION GAP SERPL CALCULATED.3IONS-SCNC: 7 MMOL/L (ref 3–14)
APAP SERPL-MCNC: <2 MG/L (ref 10–20)
APPEARANCE UR: CLEAR
AST SERPL W P-5'-P-CCNC: 12 U/L (ref 0–45)
BACTERIA #/AREA URNS HPF: ABNORMAL /HPF
BARBITURATES UR QL: NEGATIVE
BASOPHILS # BLD AUTO: 0 10E9/L (ref 0–0.2)
BASOPHILS NFR BLD AUTO: 0.3 %
BENZODIAZ UR QL: NEGATIVE
BILIRUB SERPL-MCNC: 0.7 MG/DL (ref 0.2–1.3)
BILIRUB UR QL STRIP: NEGATIVE
BUN SERPL-MCNC: 18 MG/DL (ref 7–30)
CALCIUM SERPL-MCNC: 9.5 MG/DL (ref 8.5–10.1)
CANNABINOIDS UR QL SCN: NEGATIVE
CHLORIDE SERPL-SCNC: 110 MMOL/L (ref 94–109)
CO2 SERPL-SCNC: 23 MMOL/L (ref 20–32)
COCAINE UR QL: NEGATIVE
COLOR UR AUTO: YELLOW
CREAT SERPL-MCNC: 0.77 MG/DL (ref 0.52–1.04)
DIFFERENTIAL METHOD BLD: ABNORMAL
EOSINOPHIL # BLD AUTO: 0.2 10E9/L (ref 0–0.7)
EOSINOPHIL NFR BLD AUTO: 1.4 %
ERYTHROCYTE [DISTWIDTH] IN BLOOD BY AUTOMATED COUNT: 15.3 % (ref 10–15)
ETHANOL SERPL-MCNC: <0.01 G/DL
GFR SERPL CREATININE-BSD FRML MDRD: 77 ML/MIN/1.7M2
GLUCOSE SERPL-MCNC: 108 MG/DL (ref 70–99)
GLUCOSE UR STRIP-MCNC: NEGATIVE MG/DL
HCT VFR BLD AUTO: 40.1 % (ref 35–47)
HGB BLD-MCNC: 12.4 G/DL (ref 11.7–15.7)
HGB UR QL STRIP: NEGATIVE
HYALINE CASTS #/AREA URNS LPF: 3 /LPF
IMM GRANULOCYTES # BLD: 0 10E9/L (ref 0–0.4)
IMM GRANULOCYTES NFR BLD: 0.4 %
INR PPP: 3.24 (ref 0.8–1.2)
KETONES UR STRIP-MCNC: 10 MG/DL
LEUKOCYTE ESTERASE UR QL STRIP: NEGATIVE
LITHIUM SERPL-SCNC: 0.82 MMOL/L (ref 0.6–1.2)
LYMPHOCYTES # BLD AUTO: 1.3 10E9/L (ref 0.8–5.3)
LYMPHOCYTES NFR BLD AUTO: 11.9 %
MCH RBC QN AUTO: 28 PG (ref 26.5–33)
MCHC RBC AUTO-ENTMCNC: 30.9 G/DL (ref 31.5–36.5)
MCV RBC AUTO: 91 FL (ref 78–100)
METHADONE UR QL SCN: NEGATIVE
MONOCYTES # BLD AUTO: 0.6 10E9/L (ref 0–1.3)
MONOCYTES NFR BLD AUTO: 5.6 %
MUCOUS THREADS #/AREA URNS LPF: PRESENT /LPF
NEUTROPHILS # BLD AUTO: 8.6 10E9/L (ref 1.6–8.3)
NEUTROPHILS NFR BLD AUTO: 80.4 %
NITRATE UR QL: NEGATIVE
NRBC # BLD AUTO: 0 10*3/UL
NRBC BLD AUTO-RTO: 0 /100
OPIATES UR QL SCN: NEGATIVE
PCP UR QL SCN: POSITIVE
PH UR STRIP: 6.5 PH (ref 4.7–8)
PLATELET # BLD AUTO: 136 10E9/L (ref 150–450)
POTASSIUM SERPL-SCNC: 4.5 MMOL/L (ref 3.4–5.3)
PROT SERPL-MCNC: 7.4 G/DL (ref 6.8–8.8)
RBC # BLD AUTO: 4.43 10E12/L (ref 3.8–5.2)
RBC #/AREA URNS AUTO: 0 /HPF (ref 0–2)
SALICYLATES SERPL-MCNC: <2 MG/DL
SODIUM SERPL-SCNC: 140 MMOL/L (ref 133–144)
SOURCE: ABNORMAL
SP GR UR STRIP: 1.01 (ref 1–1.03)
SQUAMOUS #/AREA URNS AUTO: 2 /HPF (ref 0–1)
UROBILINOGEN UR STRIP-MCNC: NORMAL MG/DL (ref 0–2)
WBC # BLD AUTO: 10.7 10E9/L (ref 4–11)
WBC #/AREA URNS AUTO: 2 /HPF (ref 0–5)

## 2018-08-13 PROCEDURE — 99285 EMERGENCY DEPT VISIT HI MDM: CPT | Mod: 25

## 2018-08-13 PROCEDURE — A9270 NON-COVERED ITEM OR SERVICE: HCPCS | Mod: GY | Performed by: FAMILY MEDICINE

## 2018-08-13 PROCEDURE — 80307 DRUG TEST PRSMV CHEM ANLYZR: CPT | Performed by: FAMILY MEDICINE

## 2018-08-13 PROCEDURE — 80320 DRUG SCREEN QUANTALCOHOLS: CPT | Performed by: FAMILY MEDICINE

## 2018-08-13 PROCEDURE — 80329 ANALGESICS NON-OPIOID 1 OR 2: CPT | Performed by: FAMILY MEDICINE

## 2018-08-13 PROCEDURE — 36415 COLL VENOUS BLD VENIPUNCTURE: CPT | Performed by: FAMILY MEDICINE

## 2018-08-13 PROCEDURE — 85025 COMPLETE CBC W/AUTO DIFF WBC: CPT | Performed by: FAMILY MEDICINE

## 2018-08-13 PROCEDURE — 81001 URINALYSIS AUTO W/SCOPE: CPT | Mod: 59 | Performed by: FAMILY MEDICINE

## 2018-08-13 PROCEDURE — 25000132 ZZH RX MED GY IP 250 OP 250 PS 637: Mod: GY | Performed by: INTERNAL MEDICINE

## 2018-08-13 PROCEDURE — 99285 EMERGENCY DEPT VISIT HI MDM: CPT | Mod: Z6 | Performed by: FAMILY MEDICINE

## 2018-08-13 PROCEDURE — 80053 COMPREHEN METABOLIC PANEL: CPT | Performed by: FAMILY MEDICINE

## 2018-08-13 PROCEDURE — 85610 PROTHROMBIN TIME: CPT | Performed by: INTERNAL MEDICINE

## 2018-08-13 PROCEDURE — A9270 NON-COVERED ITEM OR SERVICE: HCPCS | Mod: GY | Performed by: INTERNAL MEDICINE

## 2018-08-13 PROCEDURE — 80178 ASSAY OF LITHIUM: CPT | Performed by: FAMILY MEDICINE

## 2018-08-13 PROCEDURE — 25000132 ZZH RX MED GY IP 250 OP 250 PS 637: Mod: GY | Performed by: FAMILY MEDICINE

## 2018-08-13 RX ORDER — LITHIUM CARBONATE 300 MG
300 TABLET ORAL ONCE
Status: DISCONTINUED | OUTPATIENT
Start: 2018-08-13 | End: 2018-08-13

## 2018-08-13 RX ORDER — LITHIUM CARBONATE 300 MG
600 TABLET ORAL ONCE
Status: COMPLETED | OUTPATIENT
Start: 2018-08-13 | End: 2018-08-13

## 2018-08-13 RX ORDER — THIORIDAZINE HYDROCHLORIDE 100 MG/1
100 TABLET, FILM COATED ORAL 3 TIMES DAILY
Status: DISCONTINUED | OUTPATIENT
Start: 2018-08-13 | End: 2018-08-14 | Stop reason: DRUGHIGH

## 2018-08-13 RX ORDER — THIORIDAZINE HYDROCHLORIDE 100 MG/1
100 TABLET, FILM COATED ORAL ONCE
Status: DISCONTINUED | OUTPATIENT
Start: 2018-08-13 | End: 2018-08-13

## 2018-08-13 RX ORDER — LORAZEPAM 1 MG/1
2 TABLET ORAL ONCE
Status: COMPLETED | OUTPATIENT
Start: 2018-08-13 | End: 2018-08-13

## 2018-08-13 RX ORDER — LANOLIN ALCOHOL/MO/W.PET/CERES
3 CREAM (GRAM) TOPICAL
Status: DISCONTINUED | OUTPATIENT
Start: 2018-08-13 | End: 2018-08-15 | Stop reason: HOSPADM

## 2018-08-13 RX ADMIN — LORAZEPAM 2 MG: 1 TABLET ORAL at 21:19

## 2018-08-13 RX ADMIN — THIORIDAZINE HYDROCHLORIDE 100 MG: 100 TABLET, FILM COATED ORAL at 16:02

## 2018-08-13 RX ADMIN — THIORIDAZINE HYDROCHLORIDE 100 MG: 100 TABLET, FILM COATED ORAL at 21:18

## 2018-08-13 RX ADMIN — Medication 3 MG: at 21:18

## 2018-08-13 RX ADMIN — LITHIUM CARBONATE 600 MG: 300 TABLET ORAL at 22:25

## 2018-08-13 NOTE — PROGRESS NOTES
Approached by Dr. Batista in regards to nursing home placement.  DEC currently looking for a bed and recommending inpatient.  Reviewed with Rochelle Snider, care transitions supervisor and plan will be to allow DEC to continue to search.

## 2018-08-13 NOTE — ED NOTES
Erick Estrada called back with questions. Because pt is unable to shower herself, [facility] does not have capabilities to care for pt and are unable to accept.

## 2018-08-13 NOTE — ED NOTES
Stefany Deng at [facility] and MD notified that pt was not accepted at [facility].

## 2018-08-13 NOTE — ED NOTES
pts family left. 1:1 at beside to assure pts safety since she will sometimes want to get out of her w/c and walk around without her w/w or telling anyone. Pt cooperative at this time. Pt unable to give a urine sample at this time.

## 2018-08-13 NOTE — ED PROVIDER NOTES
eMERGENCY dEPARTMENT eNCOUnter        CHIEF COMPLAINT    Chief Complaint   Patient presents with     Psychiatric Evaluation     not sleeping, wandering, confusion. notes same symptoms off and on over the last 44 years. states had a med change last week to try to help with the symptoms. can go from sweet to paranoid. has been disruptive and physical in the past.        HPI    Frances Corea is a 58 year old female who presents with a sudden flare of her schizoaffective disorder.  She has had schizoaffective for 40 years.  Also history of bipolar.  She does very well on certain medications and then they will abruptly stop working.  She has had ECT in the past.  When she has a flareup of her disease she becomes very paranoid, does not sleep or eat.  She hears voices and becomes paranoid.  She tends to talk about things that happened in the past and is not connected to reality.  She is accompanied here by her mother and her brother.  The brother answers most questions.  The mother is power of  but she will be makes her own medical decisions.  She has not made any suicidal or homicidal statements but was swearing at her mother in the triage room.    REVIEW OF SYSTEMS    Psychiatric: positive for Auditory hallucinations, no homicidal Ideations  Respiratory: No difficulty breathing or new cough  General: No fevers or chills  Neurologic: No Headache or syncope  GI: No vomiting or diarrhea  : No dysuria or hematuria  See HPI for further details.  All other systems reviewed and are negative.    PAST MEDICAL & SURGICALHISTORY    Past Medical History:   Diagnosis Date     Acquired hypothyroidism 12/26/2017     Anxiety 3/17/2015     Aspirin contraindicated 8/8/2017     Benign essential hypertension 12/30/2016     BiPAP (biphasic positive airway pressure) dependence 12/26/2017     Bipolar 2 disorder (H) 5/2/2016     Bipolar depression (H)      Chronic anticoagulation 2010     Chronic constipation 8/2/2016     Chronic  deep vein thrombosis (DVT) of left lower extremity (H) 8/2/2016     Chronic fatigue 4/17/2018     Chronic pain of both knees 4/16/2018     COB (chronic obstructive bronchitis) (H)      Dyslipidemia      Encounter for lithium monitoring 4/16/2018     Encounter for monitoring statin therapy 8/8/2017     Esophageal reflux 3/17/2015     History of deep venous thrombosis 12/30/2016     History of psychiatric hospitalization     Multiple     Hyperlipidemia LDL goal < 100 3/17/2015     Insomnia 3/17/2015     Morbid obesity with BMI of 45.0-49.9, adult (H)      Obesity 3/17/2015     RADHA (obstructive sleep apnea)     Bipap 12-18/8-12, 4L O2     RADHA (obstructive sleep apnea) 10/5/2013     PE (pulmonary embolism) 2010    has been on chronic anticoagulation therapy.      Psoriasis 3/17/2015     RAD (reactive airway disease)     mild, intermittent     Reactive airway disease that is not asthma 4/17/2018     Schizoaffective disorder (H)      Sleep apnea      Vitamin D deficiency      Vitamin D deficiency 12/26/2017     Past Surgical History:   Procedure Laterality Date     GYN SURGERY      total hyst     HC ECP WITH CATARACT SURGERY      lt eye     HC OR OCULAR DEVICE INTRAOP DETACHED RETINA       HYSTERECTOMY       REPAIR LACERATION HAND  9/28/2013    Procedure: REPAIR LACERATION HAND;  repair left fifth finger laceration;  Surgeon: Miranda Xavier DO;  Location: [facility]       CURRENT MEDICATIONS    Current Outpatient Rx   Medication Sig Dispense Refill     albuterol (2.5 MG/3ML) 0.083% neb solution Take 1 vial by nebulization Twice daily       ASPIRIN NOT PRESCRIBED (INTENTIONAL) Please choose reason not prescribed, below 0 each 0     Calcium Carbonate-Vitamin D (CALCIUM + D PO) Take by mouth daily        glucosamine-chondroitin 500-400 MG CAPS Take 1 capsule by mouth daily       Lansoprazole (PREVACID PO) Take 15 mg by mouth daily as needed        latanoprost (XALATAN) 0.005 % ophthalmic solution Place 1 drop into both eyes At  Bedtime       lisinopril (PRINIVIL/ZESTRIL) 10 MG tablet TAKE 1 TABLET(10 MG) BY MOUTH DAILY 90 tablet 2     lithium 300 MG tablet Take 300 mg by mouth Take 1tab every  am and 2pm, 2 tabs at HS       LORAZEPAM PO Take 0.5 mg by mouth 1-2 twice daily as needed       Lutein 20 MG CAPS Take by mouth daily        MAGNESIUM OXIDE PO Take 400 mg by mouth daily        MELATONIN PO Take 3 mg by mouth At Bedtime Pt takes at 8 PM       METHIMAZOLE PO Take 5 mg by mouth daily       Multiple Vitamins-Minerals (MULTIVITAMIN & MINERAL PO) Take by mouth daily        order for DME Wheeled walker 1 Device 0     Probiotic Product (PROBIOTIC DAILY PO) Take by mouth daily        simvastatin (ZOCOR) 40 MG tablet Take 1 tablet (40 mg) by mouth At Bedtime 90 tablet 1     THIORIDAZINE HCL PO Take 100 mg by mouth       traMADol (ULTRAM) 50 MG tablet TAKE 1 TO 2 TABLETS BY MOUTH EVERY 6 HOURS AS NEEDED FOR PAIN. MAXIMUM 6 TABLETS PER DAY 60 tablet 0     UNABLE TO FIND Home oxygen 3 L/M       UNABLE TO FIND Home nebulizer       warfarin (COUMADIN) 3 MG tablet TAKE 3 TABLETS BY MOUTH ON MONDAY, WEDNESDAY, AND FRIDAY, AND 2 TABLETS BY MOUTH ON ALL OTHER DAYS OR AS DIRECTED PER [facility] 252 tablet 3     Acetaminophen (TYLENOL PO) Take 500 mg by mouth       clobetasol (TEMOVATE) 0.05 % ointment APPLY TOPICALLY TWICE DAILY ON THE BACK OF THE NECK AND HAIRLINE 60 g 0     mometasone (ELOCON) 0.1 % cream APPLY SPARINGLY EXTERNALLY TO THE AFFECTED AREA TWICE DAILY AS NEEDED. DO NOT APPLY TO FACE 45 g 0     order for DME Equipment being ordered: Neb machine and tubing 1 Device 0     order for DME Equipment being ordered: Bipap and supplies - states it has been 5 years 1 Device 0     sennosides (SENOKOT) 8.6 MG tablet Take 2 tablets by mouth daily as needed for constipation 120 tablet 1     triamcinolone (KENALOG) 0.1 % ointment APPLY TOPICALLY TWICE DAILY TO BACK OF NECK AND HAIRLINE 80 g 1     [DISCONTINUED] warfarin (COUMADIN) 3 MG tablet Take  "6mg Mon/Wed/Fri; 9mg all other days or as directed by [facility] 221 tablet 1       ALLERGIES    Allergies   Allergen Reactions     Depakote      Increased lfts and ammonia     Zyprexa Other (See Comments)     Increased lft and increased ammonia.     Adhesive Tape Rash     Levaquin        SOCIAL & FAMILYHISTORY    Social History     Social History     Marital status: Legally      Spouse name: N/A     Number of children: 0     Years of education: N/A     Occupational History      Unemployed     Social History Main Topics     Smoking status: Never Smoker     Smokeless tobacco: Never Used     Alcohol use No     Drug use: No     Sexual activity: Yes     Partners: Male     Other Topics Concern     Not on file     Social History Narrative    Lives with mother     Family History   Problem Relation Age of Onset     Ovarian Cancer Mother      Cerebrovascular Disease Father        PHYSICAL EXAM    VITAL SIGNS: /66  Temp 98.5  F (36.9  C) (Tympanic)  Resp 18  SpO2 93%  Constitutional: Obese disheveled female.  She does not address me directly.  When her mother was answering questions she suddenly said \"I can fucking answer for myself\"   eyes:  PERRL, conjunctiva normal   HENT:  Atraumatic, external ears normal, nose normal   Neck: supple, No JVD  Respiratory:  No respiratory distress, normal breath sounds  Cardiovascular:  Normal rate, normal rhythm, no murmurs  GI:  Soft, nondistended, normal bowel sounds, nontender  Musculoskeletal:  No edema, no acute deformities   Integument:  Well hydrated   Neurologic:  Awake alert and oriented, no slurred speech, normal gross motor coordination and strength   Psychiatric: as above, appears to be responding to external stimuli.    ED COURSE & MEDICAL DECISION MAKING    Pertinent Labs & Imaging studies reviewed and interpreted. (See chart for details)    Vitals:    08/13/18 1211   BP: 115/66   Resp: 18   Temp: 98.5  F (36.9  C)   TempSrc: Tympanic   SpO2: 93% "           FINAL IMPRESSION    1. Schizophrenia, unspecified type (H)      Plan: The patient is clearly decompensated.  DEC assessment recommends admitting.  She will likely need ECT so we are searching for a facility that could accommodate that.  She also has some physical and needs including BiPAP and help with bathing.  At this point we are still awaiting placement.  She is signed out to Dr. Velásquez.      (Please note that this note was completed with a voice recognition program.  Every attempt was made to edit the dictations, but inevitably there remain words that are mis-transcribed.)       Nena Batista MD  08/13/18 1947

## 2018-08-14 VITALS
DIASTOLIC BLOOD PRESSURE: 68 MMHG | SYSTOLIC BLOOD PRESSURE: 122 MMHG | OXYGEN SATURATION: 94 % | RESPIRATION RATE: 20 BRPM | TEMPERATURE: 98 F | HEART RATE: 69 BPM

## 2018-08-14 LAB — INR PPP: 2.91 (ref 0.8–1.2)

## 2018-08-14 PROCEDURE — 25000132 ZZH RX MED GY IP 250 OP 250 PS 637: Mod: GY | Performed by: FAMILY MEDICINE

## 2018-08-14 PROCEDURE — 25000128 H RX IP 250 OP 636: Performed by: FAMILY MEDICINE

## 2018-08-14 PROCEDURE — 96372 THER/PROPH/DIAG INJ SC/IM: CPT

## 2018-08-14 PROCEDURE — 36415 COLL VENOUS BLD VENIPUNCTURE: CPT | Performed by: FAMILY MEDICINE

## 2018-08-14 PROCEDURE — A9270 NON-COVERED ITEM OR SERVICE: HCPCS | Mod: GY | Performed by: FAMILY MEDICINE

## 2018-08-14 PROCEDURE — 25000128 H RX IP 250 OP 636

## 2018-08-14 PROCEDURE — 85610 PROTHROMBIN TIME: CPT | Performed by: FAMILY MEDICINE

## 2018-08-14 RX ORDER — OLANZAPINE 10 MG/2ML
INJECTION, POWDER, FOR SOLUTION INTRAMUSCULAR
Status: COMPLETED
Start: 2018-08-14 | End: 2018-08-14

## 2018-08-14 RX ORDER — SIMVASTATIN 40 MG
40 TABLET ORAL EVERY EVENING
Status: DISCONTINUED | OUTPATIENT
Start: 2018-08-14 | End: 2018-08-15 | Stop reason: HOSPADM

## 2018-08-14 RX ORDER — LITHIUM CARBONATE 300 MG
600 TABLET ORAL AT BEDTIME
Status: DISCONTINUED | OUTPATIENT
Start: 2018-08-14 | End: 2018-08-15 | Stop reason: HOSPADM

## 2018-08-14 RX ORDER — OLANZAPINE 10 MG/2ML
10 INJECTION, POWDER, FOR SOLUTION INTRAMUSCULAR ONCE
Status: COMPLETED | OUTPATIENT
Start: 2018-08-14 | End: 2018-08-14

## 2018-08-14 RX ORDER — WARFARIN SODIUM 3 MG/1
6 TABLET ORAL
Status: COMPLETED | OUTPATIENT
Start: 2018-08-14 | End: 2018-08-14

## 2018-08-14 RX ORDER — LITHIUM CARBONATE 300 MG
300 TABLET ORAL 2 TIMES DAILY
Status: DISCONTINUED | OUTPATIENT
Start: 2018-08-14 | End: 2018-08-14

## 2018-08-14 RX ORDER — METHIMAZOLE 5 MG/1
5 TABLET ORAL DAILY
Status: DISCONTINUED | OUTPATIENT
Start: 2018-08-14 | End: 2018-08-15 | Stop reason: HOSPADM

## 2018-08-14 RX ORDER — LITHIUM CARBONATE 300 MG
300 TABLET ORAL 2 TIMES DAILY
Status: DISCONTINUED | OUTPATIENT
Start: 2018-08-14 | End: 2018-08-15 | Stop reason: HOSPADM

## 2018-08-14 RX ORDER — MAGNESIUM OXIDE 400 MG/1
400 TABLET ORAL DAILY
Status: DISCONTINUED | OUTPATIENT
Start: 2018-08-14 | End: 2018-08-15 | Stop reason: HOSPADM

## 2018-08-14 RX ORDER — LISINOPRIL 5 MG/1
10 TABLET ORAL ONCE
Status: COMPLETED | OUTPATIENT
Start: 2018-08-14 | End: 2018-08-14

## 2018-08-14 RX ORDER — THIORIDAZINE HYDROCHLORIDE 100 MG/1
200 TABLET, FILM COATED ORAL 3 TIMES DAILY
Status: DISCONTINUED | OUTPATIENT
Start: 2018-08-14 | End: 2018-08-15 | Stop reason: HOSPADM

## 2018-08-14 RX ADMIN — OLANZAPINE 10 MG: 10 INJECTION, POWDER, LYOPHILIZED, FOR SOLUTION INTRAMUSCULAR at 18:52

## 2018-08-14 RX ADMIN — LITHIUM CARBONATE 300 MG: 300 TABLET ORAL at 10:26

## 2018-08-14 RX ADMIN — OLANZAPINE 10 MG: 10 INJECTION, POWDER, FOR SOLUTION INTRAMUSCULAR at 18:52

## 2018-08-14 RX ADMIN — THIORIDAZINE HYDROCHLORIDE 200 MG: 100 TABLET, FILM COATED ORAL at 16:48

## 2018-08-14 RX ADMIN — OLANZAPINE 10 MG: 10 INJECTION, POWDER, LYOPHILIZED, FOR SOLUTION INTRAMUSCULAR at 12:52

## 2018-08-14 RX ADMIN — WARFARIN SODIUM 6 MG: 3 TABLET ORAL at 18:51

## 2018-08-14 RX ADMIN — LISINOPRIL 10 MG: 5 TABLET ORAL at 10:23

## 2018-08-14 RX ADMIN — METHIMAZOLE 5 MG: 5 TABLET ORAL at 10:28

## 2018-08-14 RX ADMIN — LITHIUM CARBONATE 300 MG: 300 TABLET ORAL at 16:48

## 2018-08-14 RX ADMIN — THIORIDAZINE HYDROCHLORIDE 200 MG: 100 TABLET, FILM COATED ORAL at 10:24

## 2018-08-14 RX ADMIN — MAGNESIUM OXIDE TAB 400 MG (241.3 MG ELEMENTAL MG) 400 MG: 400 (241.3 MG) TAB at 10:22

## 2018-08-14 NOTE — ED NOTES
pts mother calling to check up on pt. She will be in in the am to visit if pt is still here. Pt uses bipap at night.

## 2018-08-14 NOTE — PROGRESS NOTES
Message at [facility].  No beds at [facility].  No beds at [facility].  No beds at [facility].  No beds at [facility].  Followed up with Kevin at [facility], they are working on appropriate bed availability.

## 2018-08-14 NOTE — ED NOTES
Pt incontinent of urine.  Up to medical floor to shower, accompanied by  and paramedic student.  Assist of 2 to transfer patient to shower chair.  Pt calm and cooperative.  Returned to room 7. Denies pain at this time.  Breakfast ordered. VSS.

## 2018-08-14 NOTE — ED NOTES
"Inquired with [facility] regarding potential admission that we have been waiting for and they state they do not have ECT tx capablities and are unable to accept pt. Called central intake and spoke with Michelle who reports that she inquired with [facility] with the understanding that it was \"only beneficial for pt to go to a facility with ECT therapy not mandatory.\" Informed central intake that is was DEC and ED MD decision to send pt to an ECT tx facility and that it was made clear to previous shift that pt was to be sent to ECT facility. [facility] reports that they will keep pt on [facility] and [facility] waiting list but do not know of any facilities in the [facility] that have ECT so the search ends for placement.   "

## 2018-08-14 NOTE — ED NOTES
O2 on at 2L NC and HOB elevated to 25 degrees to keep sats greater than 90%. Pt changed into scrub top. No 3XL bottoms available. Left in pt's own stretch pants. O2 sat probe placed for monitoring.

## 2018-08-14 NOTE — ED NOTES
Got her up 2 times and she sat on the commode for 30 min each time with no urine each time. Once at 1600 and then again at 1800.

## 2018-08-14 NOTE — ED NOTES
Face to face report given with opportunity to observe patient.    Report given to ray Kang   8/13/2018  11:02 PM

## 2018-08-14 NOTE — PROGRESS NOTES
Received call back from Kevin at [facility] [place] is unable to accommodate Frances, soonest may be tomorrow.  [facility] full at this time.  Left message for Emile at [facility].

## 2018-08-14 NOTE — ED NOTES
LUCIANO from [facility].  He checked with the  at [facility] to see if they can accommodate the pt.  Who will speak with a provider and make a determination based on the recommendation of the [facility] .  [facility] is at capacity.

## 2018-08-14 NOTE — ED NOTES
O2 sats 89% RA. Per report, pt uses bipap at home. Dr. Saba notified. Will apply O2 to keep sats greater than 90%.

## 2018-08-14 NOTE — PROGRESS NOTES
Received call back from [facility], they are unable to take patient. Spoke with Tu at [facility], they are unable to accept Frances.  Re-faxed information to [facility].

## 2018-08-14 NOTE — ED NOTES
Pt awake. Warm blanket given, boosted pt up in bed. Ice water given. States she is going to go back to bed.

## 2018-08-14 NOTE — ED NOTES
Pt had O2 sat probe and O2 off. Pt awake and mumbling. Laying on her back with one leg off the bed. Readjusted pt position. Pt watch continuous.

## 2018-08-14 NOTE — ED NOTES
Pt awake.  Stated she wet the bed.  Linen and bedding soaked with urine.  Contacted house supervisor to arrange for shower on medical floor.  Pt calm and cooperative.  Security present outside room.

## 2018-08-14 NOTE — PROGRESS NOTES
Received call back from Emile at [facility], she is sending information to the facility doctor at this time.  Updated Dr. Batista.

## 2018-08-14 NOTE — PROGRESS NOTES
Accepted to [facility].  Updated Dr. Batista and Chioma RN.  Provided Chioma with nurse to nurse number ([phone]).

## 2018-08-14 NOTE — ED NOTES
Pt talking nonsense and hallucinating. Unable to understand what pt wants. Pt wanted candy, small tootsie roll given to pt. Pt encouraged to provide urine sample. 2 glasses of ice water given.

## 2018-08-14 NOTE — ED NOTES
Pt up to room medical floor via w/c, accompanied by  and paramedic student.  Pt calm and cooperative at this time.

## 2018-08-14 NOTE — ED NOTES
Nursing Supervisor Lydia NELSON notified to bring a hospital bed to ED as pt will be boarded in the ED till AM per [facility] when they will look for a mental health unit that offers ECT treatment in the AM.

## 2018-08-14 NOTE — ED NOTES
Received report from Fernandez NELSON. Pt transferred to Room 7. Pt watch continues. Placed in hospital bed for comfort

## 2018-08-14 NOTE — PROGRESS NOTES
Contacted [facility], reviewing.  [facility].  [facility] full, expecting discharges.  Message left at [facility] and faxed referral.  Spoke with Tu at [facility], faxed referral.  Spoke with Isac at [facility] and faxed referral.  Left message and faxed referral to [facility].  Spoke with Emile at [facility] and faxed referral.  Received call back from Nataliia at [facility], willing to screen.  Faxed referral.

## 2018-08-14 NOTE — ED PROVIDER NOTES
The patient rested comfortably overnight I assumed care again this morning she has been much more vocal here and has to be redirected.  She was given her regular medications as well as a dose of Zyprexa 10 mg.  She has been accepted at [facility] and we are awaiting transport.     Nena Batista MD  08/14/18 8803

## 2018-08-16 ENCOUNTER — MEDICAL CORRESPONDENCE (OUTPATIENT)
Dept: HEALTH INFORMATION MANAGEMENT | Facility: CLINIC | Age: 58
End: 2018-08-16

## 2018-08-21 ENCOUNTER — TELEPHONE (OUTPATIENT)
Dept: FAMILY MEDICINE | Facility: OTHER | Age: 58
End: 2018-08-21

## 2018-08-29 ENCOUNTER — ANTICOAGULATION THERAPY VISIT (OUTPATIENT)
Dept: ANTICOAGULATION | Facility: OTHER | Age: 58
End: 2018-08-29
Payer: COMMERCIAL

## 2018-08-29 DIAGNOSIS — Z86.718 HISTORY OF DEEP VENOUS THROMBOSIS: ICD-10-CM

## 2018-08-29 DIAGNOSIS — Z86.711 HISTORY OF PULMONARY EMBOLISM: ICD-10-CM

## 2018-08-29 NOTE — MR AVS SNAPSHOT
Frances Corea   8/29/2018   Anticoagulation Therapy Visit    Description:  58 year old female   Provider:  Ayah Rojas NP   Department:  Hc Anti Coagulation           INR as of 8/29/2018     Today's INR No new INR was available at the time of this encounter.      Anticoagulation Summary as of 8/29/2018     INR goal 2.0-3.0   Today's INR No new INR was available at the time of this encounter.   Full warfarin instructions 9 mg on Mon, Wed, Fri; 6 mg all other days   Next INR check 9/28/2018    Indications   History of deep venous thrombosis [Z86.718]  History of pulmonary embolism [Z86.711]  Long-term (current) use of anticoagulants [Z79.01] (Resolved) [Z79.01]         August 2018 Details    Sun Mon Tue Wed Thu Fri Sat        1               2               3               4                 5               6               7               8               9               10               11                 12               13               14               15               16               17               18                 19               20               21               22               23               24               25                 26               27               28               29      9 mg   See details      30      6 mg         31      9 mg           Date Details   08/29 This INR check               How to take your warfarin dose     To take:  6 mg Take 2 of the 3 mg tablets.    To take:  9 mg Take 3 of the 3 mg tablets.           September 2018 Details    Sun Mon Tue Wed Thu Fri Sat           1      6 mg           2      6 mg         3      9 mg         4      6 mg         5      9 mg         6      6 mg         7      9 mg         8      6 mg           9      6 mg         10      9 mg         11      6 mg         12      9 mg         13      6 mg         14      9 mg         15      6 mg           16      6 mg         17      9 mg         18      6 mg         19      9 mg         20      6  mg         21      9 mg         22      6 mg           23      6 mg         24      9 mg         25      6 mg         26      9 mg         27      6 mg         28            29                 30                      Date Details   No additional details    Date of next INR:  9/28/2018         How to take your warfarin dose     To take:  6 mg Take 2 of the 3 mg tablets.    To take:  9 mg Take 3 of the 3 mg tablets.

## 2018-08-29 NOTE — PROGRESS NOTES
ANTICOAGULATION FOLLOW-UP CLINIC VISIT    Patient Name:  Frances Corea  Date:  8/29/2018  Contact Type:  chart review    SUBJECTIVE:     Patient Findings     Positives No Problem Findings    Comments Patient INR is overdue so did some research in patient chart. According to last note by KALLIE Rojas, patient mother had called clinic on 8/21/18 stating that patient had relapse of her long standing mental health issues and was being admitted to a psych unit and would be there at least 30 days. Unknown where patient has been hospitalized.            OBJECTIVE    INR   Date Value Ref Range Status   08/14/2018 2.91 (H) 0.80 - 1.20 Final       ASSESSMENT / PLAN  No question data found.  Anticoagulation Summary as of 8/29/2018     INR goal 2.0-3.0   Today's INR No new INR was available at the time of this encounter.   Warfarin maintenance plan 9 mg (3 mg x 3) on Mon, Wed, Fri; 6 mg (3 mg x 2) all other days   Full warfarin instructions 9 mg on Mon, Wed, Fri; 6 mg all other days   Weekly warfarin total 51 mg   No change documented Stefany Huynh RN   Plan last modified Stefany Huynh RN (6/11/2018)   Next INR check 9/28/2018   Target end date     Indications   History of deep venous thrombosis [Z86.718]  History of pulmonary embolism [Z86.711]  Long-term (current) use of anticoagulants [Z79.01] (Resolved) [Z79.01]         Anticoagulation Episode Summary     INR check location     Preferred lab     Send INR reminders to  PA POOL    Comments       Anticoagulation Care Providers     Provider Role Specialty Phone number    Ayah Rojas NP [facility] [phone]            See the Encounter Report to view Anticoagulation Flowsheet and Dosing Calendar (Go to Encounters tab in chart review, and find the Anticoagulation Therapy Visit)        Stefany Huynh RN

## 2018-09-04 ENCOUNTER — TRANSFERRED RECORDS (OUTPATIENT)
Dept: HEALTH INFORMATION MANAGEMENT | Facility: CLINIC | Age: 58
End: 2018-09-04

## 2018-10-04 ENCOUNTER — ANTICOAGULATION THERAPY VISIT (OUTPATIENT)
Dept: ANTICOAGULATION | Facility: OTHER | Age: 58
End: 2018-10-04
Payer: COMMERCIAL

## 2018-10-04 DIAGNOSIS — Z86.711 HISTORY OF PULMONARY EMBOLISM: ICD-10-CM

## 2018-10-04 DIAGNOSIS — Z86.718 HISTORY OF DEEP VENOUS THROMBOSIS: ICD-10-CM

## 2018-10-04 NOTE — PROGRESS NOTES
ANTICOAGULATION FOLLOW-UP CLINIC VISIT    Patient Name:  Frances Corea  Date:  10/4/2018  Contact Type:  Telephone/ spoke to patient mother.    SUBJECTIVE:     Patient Findings     Positives Other complaints    Comments Call placed to patient mother. She states patient is still in [place] and unknown discharge.  She states that they are doing ECT treatments on patient.  Patient mom states she will let me know when patient is home again.            OBJECTIVE    INR   Date Value Ref Range Status   08/14/2018 2.91 (H) 0.80 - 1.20 Final       ASSESSMENT / PLAN  No question data found.  Anticoagulation Summary as of 10/4/2018     INR goal 2.0-3.0   Today's INR No new INR was available at the time of this encounter.   Warfarin maintenance plan 9 mg (3 mg x 3) on Mon, Wed, Fri; 6 mg (3 mg x 2) all other days   Full warfarin instructions 9 mg on Mon, Wed, Fri; 6 mg all other days   Weekly warfarin total 51 mg   No change documented Stefany Huynh RN   Plan last modified Stefany Huynh RN (6/11/2018)   Next INR check 11/30/2018   Target end date     Indications   History of deep venous thrombosis [Z86.718]  History of pulmonary embolism [Z86.711]  Long-term (current) use of anticoagulants [Z79.01] (Resolved) [Z79.01]         Anticoagulation Episode Summary     INR check location     Preferred lab     Send INR reminders to  ANTICOAG POOL    Comments       Anticoagulation Care Providers     Provider Role Specialty Phone number    Ayah Rojas NP [facility] [phone]            See the Encounter Report to view Anticoagulation Flowsheet and Dosing Calendar (Go to Encounters tab in chart review, and find the Anticoagulation Therapy Visit)        Stefany Huynh RN

## 2018-10-04 NOTE — MR AVS SNAPSHOT
Frances Corea   10/4/2018   Anticoagulation Therapy Visit    Description:  58 year old female   Provider:  Ayah Rojas NP   Department:  Hc Anti Coagulation           INR as of 10/4/2018     Today's INR No new INR was available at the time of this encounter.      Anticoagulation Summary as of 10/4/2018     INR goal 2.0-3.0   Today's INR No new INR was available at the time of this encounter.   Full warfarin instructions 9 mg on Mon, Wed, Fri; 6 mg all other days   Next INR check 11/30/2018    Indications   History of deep venous thrombosis [Z86.718]  History of pulmonary embolism [Z86.711]  Long-term (current) use of anticoagulants [Z79.01] (Resolved) [Z79.01]         October 2018 Details    Sun Mon Tue Wed Thu Fri Sat      1               2               3               4      6 mg   See details      5      9 mg         6      6 mg           7      6 mg         8      9 mg         9      6 mg         10      9 mg         11      6 mg         12      9 mg         13      6 mg           14      6 mg         15      9 mg         16      6 mg         17      9 mg         18      6 mg         19      9 mg         20      6 mg           21      6 mg         22      9 mg         23      6 mg         24      9 mg         25      6 mg         26      9 mg         27      6 mg           28      6 mg         29      9 mg         30      6 mg         31      9 mg             Date Details   10/04 This INR check               How to take your warfarin dose     To take:  6 mg Take 2 of the 3 mg tablets.    To take:  9 mg Take 3 of the 3 mg tablets.           November 2018 Details    Sun Mon Tue Wed Thu Fri Sat         1      6 mg         2      9 mg         3      6 mg           4      6 mg         5      9 mg         6      6 mg         7      9 mg         8      6 mg         9      9 mg         10      6 mg           11      6 mg         12      9 mg         13      6 mg         14      9 mg         15      6 mg          16      9 mg         17      6 mg           18      6 mg         19      9 mg         20      6 mg         21      9 mg         22      6 mg         23      9 mg         24      6 mg           25      6 mg         26      9 mg         27      6 mg         28      9 mg         29      6 mg         30              Date Details   No additional details    Date of next INR:  11/30/2018         How to take your warfarin dose     To take:  6 mg Take 2 of the 3 mg tablets.    To take:  9 mg Take 3 of the 3 mg tablets.

## 2018-10-19 DIAGNOSIS — Z86.711 HISTORY OF PULMONARY EMBOLISM: ICD-10-CM

## 2018-10-19 RX ORDER — SIMVASTATIN 40 MG
TABLET ORAL
Qty: 90 TABLET | Refills: 1 | Status: SHIPPED | OUTPATIENT
Start: 2018-10-19 | End: 2019-03-20

## 2018-12-05 ENCOUNTER — ANTICOAGULATION THERAPY VISIT (OUTPATIENT)
Dept: ANTICOAGULATION | Facility: OTHER | Age: 58
End: 2018-12-05
Payer: COMMERCIAL

## 2018-12-05 DIAGNOSIS — Z86.718 HISTORY OF DEEP VENOUS THROMBOSIS: ICD-10-CM

## 2018-12-05 DIAGNOSIS — Z86.711 HISTORY OF PULMONARY EMBOLISM: ICD-10-CM

## 2018-12-05 NOTE — MR AVS SNAPSHOT
Frances Corea   12/5/2018   Anticoagulation Therapy Visit    Description:  58 year old female   Provider:  Ayah Rojas NP   Department:  [facility]           INR as of 12/5/2018     Today's INR No new INR was available at the time of this encounter.      Anticoagulation Summary as of 12/5/2018     INR goal 2.0-3.0   Today's INR No new INR was available at the time of this encounter.   Full warfarin instructions 9 mg on Mon, Wed, Fri; 6 mg all other days   Next INR check 2/28/2019    Indications   History of deep venous thrombosis [Z86.718]  History of pulmonary embolism [Z86.711]  Long-term (current) use of anticoagulants [Z79.01] (Resolved) [Z79.01]         December 2018 Details    Sun Mon Tue Wed Thu Fri Sat           1                 2               3               4               5      9 mg   See details      6      6 mg         7      9 mg         8      6 mg           9      6 mg         10      9 mg         11      6 mg         12      9 mg         13      6 mg         14      9 mg         15      6 mg           16      6 mg         17      9 mg         18      6 mg         19      9 mg         20      6 mg         21      9 mg         22      6 mg           23      6 mg         24      9 mg         25      6 mg         26      9 mg         27      6 mg         28      9 mg         29      6 mg           30      6 mg         31      9 mg               Date Details   12/05 This INR check               How to take your warfarin dose     To take:  6 mg Take 2 of the 3 mg tablets.    To take:  9 mg Take 3 of the 3 mg tablets.           January 2019 Details    Sun Mon Tue Wed Thu Fri Sat       1      6 mg         2      9 mg         3      6 mg         4      9 mg         5      6 mg           6      6 mg         7      9 mg         8      6 mg         9      9 mg         10      6 mg         11      9 mg         12      6 mg           13      6 mg         14      9 mg         15      6 mg          16      9 mg         17      6 mg         18      9 mg         19      6 mg           20      6 mg         21      9 mg         22      6 mg         23      9 mg         24      6 mg         25      9 mg         26      6 mg           27      6 mg         28      9 mg         29      6 mg         30      9 mg         31      6 mg            Date Details   No additional details            How to take your warfarin dose     To take:  6 mg Take 2 of the 3 mg tablets.    To take:  9 mg Take 3 of the 3 mg tablets.           February 2019 Details    Sun Mon Tue Wed Thu Fri Sat          1      9 mg         2      6 mg           3      6 mg         4      9 mg         5      6 mg         6      9 mg         7      6 mg         8      9 mg         9      6 mg           10      6 mg         11      9 mg         12      6 mg         13      9 mg         14      6 mg         15      9 mg         16      6 mg           17      6 mg         18      9 mg         19      6 mg         20      9 mg         21      6 mg         22      9 mg         23      6 mg           24      6 mg         25      9 mg         26      6 mg         27      9 mg         28               Date Details   No additional details    Date of next INR:  2/28/2019         How to take your warfarin dose     To take:  6 mg Take 2 of the 3 mg tablets.    To take:  9 mg Take 3 of the 3 mg tablets.

## 2018-12-05 NOTE — PROGRESS NOTES
ANTICOAGULATION FOLLOW-UP CLINIC VISIT    Patient Name:  Frances Corea  Date:  12/5/2018  Contact Type:  Telephone/ spoke to patient motherFrances    SUBJECTIVE:     Patient Findings     Positives Other complaints    Comments Call placed to patient mother and spoke to her re: Frances. She states Frances is still in [facility]. They are unsure when she will be discharged. Patient mother states she will call and let me know when patient is back to her home.            OBJECTIVE    INR   Date Value Ref Range Status   08/14/2018 2.91 (H) 0.80 - 1.20 Final       ASSESSMENT / PLAN  No question data found.  Anticoagulation Summary as of 12/5/2018     INR goal 2.0-3.0   Today's INR No new INR was available at the time of this encounter.   Warfarin maintenance plan 9 mg (3 mg x 3) on Mon, Wed, Fri; 6 mg (3 mg x 2) all other days   Full warfarin instructions 9 mg on Mon, Wed, Fri; 6 mg all other days   Weekly warfarin total 51 mg   No change documented Stefany Huynh RN   Plan last modified Stefany Huynh RN (6/11/2018)   Next INR check 2/28/2019   Target end date     Indications   History of deep venous thrombosis [Z86.718]  History of pulmonary embolism [Z86.711]  Long-term (current) use of anticoagulants [Z79.01] (Resolved) [Z79.01]         Anticoagulation Episode Summary     INR check location     Preferred lab     Send INR reminders to  ANTICOAG POOL    Comments       Anticoagulation Care Providers     Provider Role Specialty Phone number    Crystal JACK Poon [facility] [phone]            See the Encounter Report to view Anticoagulation Flowsheet and Dosing Calendar (Go to Encounters tab in chart review, and find the Anticoagulation Therapy Visit)        Stefany Huynh RN

## 2018-12-18 ENCOUNTER — TRANSFERRED RECORDS (OUTPATIENT)
Dept: HEALTH INFORMATION MANAGEMENT | Facility: CLINIC | Age: 58
End: 2018-12-18

## 2018-12-18 ENCOUNTER — MEDICAL CORRESPONDENCE (OUTPATIENT)
Dept: HEALTH INFORMATION MANAGEMENT | Facility: CLINIC | Age: 58
End: 2018-12-18

## 2018-12-24 ENCOUNTER — TRANSFERRED RECORDS (OUTPATIENT)
Dept: HEALTH INFORMATION MANAGEMENT | Facility: CLINIC | Age: 58
End: 2018-12-24

## 2018-12-24 ENCOUNTER — TELEPHONE (OUTPATIENT)
Dept: FAMILY MEDICINE | Facility: OTHER | Age: 58
End: 2018-12-24

## 2018-12-24 DIAGNOSIS — L20.9 ATOPIC DERMATITIS, UNSPECIFIED TYPE: ICD-10-CM

## 2018-12-24 RX ORDER — ALBUTEROL SULFATE 0.83 MG/ML
2.5 SOLUTION RESPIRATORY (INHALATION)
Qty: 1 BOX | Refills: 3 | COMMUNITY
Start: 2018-12-24 | End: 2019-03-20

## 2018-12-24 RX ORDER — TRIAMCINOLONE ACETONIDE 1 MG/G
OINTMENT TOPICAL
Qty: 80 G | Refills: 1 | COMMUNITY
Start: 2018-12-24

## 2018-12-24 NOTE — TELEPHONE ENCOUNTER
Mom calls, pt was scheduled for 12-26-18 for hospital follow-up mental break down.  Is so weak, unable to leave home at this time, will be receiving home PT and home health care.  Mom reports mentally pt is doing well, will call and reschedule when pt is stronger.

## 2018-12-28 ENCOUNTER — ANTICOAGULATION THERAPY VISIT (OUTPATIENT)
Dept: ANTICOAGULATION | Facility: OTHER | Age: 58
End: 2018-12-28
Payer: COMMERCIAL

## 2018-12-28 DIAGNOSIS — Z86.711 HISTORY OF PULMONARY EMBOLISM: ICD-10-CM

## 2018-12-28 DIAGNOSIS — Z86.718 HISTORY OF DEEP VENOUS THROMBOSIS: ICD-10-CM

## 2018-12-28 DIAGNOSIS — Z79.01 LONG TERM CURRENT USE OF ANTICOAGULANT THERAPY: Primary | ICD-10-CM

## 2018-12-28 LAB — INR PPP: 4.5

## 2018-12-28 RX ORDER — WARFARIN SODIUM 5 MG/1
TABLET ORAL
Qty: 90 TABLET | Refills: 3 | Status: SHIPPED | OUTPATIENT
Start: 2018-12-28 | End: 2019-06-21

## 2018-12-28 NOTE — PROGRESS NOTES
ANTICOAGULATION FOLLOW-UP CLINIC VISIT    Patient Name:  Frances Corea  Date:  2018  Contact Type:  Telephone/ [facility] Nurse    SUBJECTIVE:     Patient Findings     Positives:   Extra doses    Comments:     POCT INR drawn today by [facility] nurse today; results discussed by phone today.  Coumadin dosing/INR recheck information discussed by phone with homecare nurse today.  Pt's mother was giving the pt 6mg vs the prescribed 5mg daily due to having only 3mg Coumadin tabs.  The mother did not report this to the homecare nurse at the initial visit earlier in the week.  New script for 5mg tabs was sent the pt's preferred pharmacy.  Per medical record notes, the pt was receiving 5mg daily; last INR was 2.0 and the INR prior was within the INR Goal of 2.0-3.0, per Fernandez.  Homecare visits the pt every Mon/Wed; other home assistance provided by the pt's mother, Frances.  Dosage adjustment made based on physician approved plan of care, review of INR history and information from homecare nurse's observation.   Homecare nurse accurately repeated Coumadin dosing and INR recheck information.  Nurse informed to contact the AC clinic with any questions, medication changes or other changes that may affect the INR/Coumadin dosing.  Call ended with questions answered and understanding stated.             OBJECTIVE    INR   Date Value Ref Range Status   2018 4.5  Final       ASSESSMENT / PLAN  INR assessment SUPRA    Recheck INR In: 3 DAYS    INR Location Homecare INR      Anticoagulation Summary  As of 2018    INR goal:   2.0-3.0   TTR:   36.7 % (11.9 mo)   INR used for dosin.5! (2018)   Warfarin maintenance plan:   5 mg (5 mg x 1) every day   Full warfarin instructions:   : Hold; : Hold; Otherwise 5 mg every day   Weekly warfarin total:   35 mg   Plan last modified:   Stefany Marquez RN (2018)   Next INR check:   2018   Target end date:    Indefinite    Indications    History of deep venous thrombosis [Z86.718]  History of pulmonary embolism [Z86.711]  Long-term (current) use of anticoagulants [Z79.01] (Resolved) [Z79.01]             Anticoagulation Episode Summary     INR check location:       Preferred lab:       Send INR reminders to:   HC ANTICOAG POOL    Comments:   Fernandez - [facility] visits M/W; returned from [facility] on 12/21; was taking 5mg daily w/therapeutic INR's per records      Anticoagulation Care Providers     Provider Role Specialty Phone number    Ayah Rojas NP [facility] [phone]            See the Encounter Report to view Anticoagulation Flowsheet and Dosing Calendar (Go to Encounters tab in chart review, and find the Anticoagulation Therapy Visit)        Stefany Marquez RN

## 2018-12-31 ENCOUNTER — ANTICOAGULATION THERAPY VISIT (OUTPATIENT)
Dept: ANTICOAGULATION | Facility: OTHER | Age: 58
End: 2018-12-31
Payer: COMMERCIAL

## 2018-12-31 DIAGNOSIS — Z86.718 HISTORY OF DEEP VENOUS THROMBOSIS: ICD-10-CM

## 2018-12-31 DIAGNOSIS — Z86.711 HISTORY OF PULMONARY EMBOLISM: ICD-10-CM

## 2018-12-31 LAB — INR PPP: 2

## 2018-12-31 NOTE — PROGRESS NOTES
ANTICOAGULATION FOLLOW-UP CLINIC VISIT    Patient Name:  Frances Corea  Date:  2018  Contact Type:  Telephone/ spoke to homecare nurseFernandez    SUBJECTIVE:     Patient Findings     Comments:   INR done by homecare nurse and result called to warfarin clinic. Per nurse I spoke to, patient has no bleeding/bruising and no new medication changes since getting home on 18. We discussed INR result, warfarin dosing and INR recheck date. Nurse verbalized understanding and has no questions.            OBJECTIVE    INR   Date Value Ref Range Status   2018 2.0  Final       ASSESSMENT / PLAN  INR assessment THER    Recheck INR In: 1 WEEK    INR Location Homecare INR      Anticoagulation Summary  As of 2018    INR goal:   2.0-3.0   TTR:   36.8 % (1 y)   INR used for dosin.0 (2018)   Warfarin maintenance plan:   5 mg (5 mg x 1) every day   Full warfarin instructions:   : 7.5 mg; : 7.5 mg; Otherwise 5 mg every day   Weekly warfarin total:   35 mg   Plan last modified:   Stefany Huynh RN (2018)   Next INR check:   2019   Target end date:   Indefinite    Indications    History of deep venous thrombosis [Z86.718]  History of pulmonary embolism [Z86.711]  Long-term (current) use of anticoagulants [Z79.01] (Resolved) [Z79.01]             Anticoagulation Episode Summary     INR check location:       Preferred lab:       Send INR reminders to:   [facility] S[facility]    Comments:   Fernandez Church  visits M/W; returned from [facility] on ; was taking 5mg daily w/therapeutic INR's per records      Anticoagulation Care Providers     Provider Role Specialty Phone number    Ayah Rojas NP [facility] [phone]            See the Encounter Report to view Anticoagulation Flowsheet and Dosing Calendar (Go to Encounters tab in chart review, and find the Anticoagulation Therapy Visit)        Stefany Huynh RN

## 2019-01-03 DIAGNOSIS — Z53.9 PERSONS ENCOUNTERING HEALTH SERVICES FOR SPECIFIC PROCEDURES, NOT CARRIED OUT: Primary | ICD-10-CM

## 2019-01-03 PROCEDURE — G0180 MD CERTIFICATION HHA PATIENT: HCPCS | Performed by: FAMILY MEDICINE

## 2019-01-07 ENCOUNTER — ANTICOAGULATION THERAPY VISIT (OUTPATIENT)
Dept: ANTICOAGULATION | Facility: OTHER | Age: 59
End: 2019-01-07
Payer: COMMERCIAL

## 2019-01-07 DIAGNOSIS — Z86.718 HISTORY OF DEEP VENOUS THROMBOSIS: ICD-10-CM

## 2019-01-07 DIAGNOSIS — Z86.711 HISTORY OF PULMONARY EMBOLISM: ICD-10-CM

## 2019-01-07 LAB — INR PPP: 5.1

## 2019-01-07 NOTE — PROGRESS NOTES
ANTICOAGULATION FOLLOW-UP CLINIC VISIT    Patient Name:  Frances Corea  Date:  2019  Contact Type:  Telephone/ spoke to homecare nurse    SUBJECTIVE:     Patient Findings     Positives:   Unexplained INR or factor level change    Comments:   INR done by homecare nurse and result called to and message left re: INR result. Call returned to homecare nurse and we discussed INR result, warfarin dosing and INR recheck date. Per nurse, patient has no bleeding/bruising and no new changes in meds. She will be starting home PT and is now eating salads daily. Nurse verbalized understanding of instruction and has no questions.            OBJECTIVE    INR   Date Value Ref Range Status   2019 5.1  Final       ASSESSMENT / PLAN  INR assessment SUPRA    Recheck INR In: 2 DAYS    INR Location Homecare INR      Anticoagulation Summary  As of 2019    INR goal:   2.0-3.0   TTR:   36.7 % (1 y)   INR used for dosin.1! (2019)   Warfarin maintenance plan:   5 mg (5 mg x 1) every day   Full warfarin instructions:   : Hold; Otherwise 5 mg every day   Weekly warfarin total:   35 mg   Plan last modified:   Stefany Huynh RN (2018)   Next INR check:   2019   Target end date:   Indefinite    Indications    History of deep venous thrombosis [Z86.718]  History of pulmonary embolism [Z86.711]  Long-term (current) use of anticoagulants [Z79.01] (Resolved) [Z79.01]             Anticoagulation Episode Summary     INR check location:       Preferred lab:       Send INR reminders to:    ANTICOAG POOL    Comments:   Fernandez - Ranjit  visits M/W; returned from [facility] on ; was taking 5mg daily w/therapeutic INR's per records      Anticoagulation Care Providers     Provider Role Specialty Phone number    Ayah Rojas NP [facility] [phone]            See the Encounter Report to view Anticoagulation Flowsheet and Dosing Calendar (Go to Encounters tab in chart review, and  find the Anticoagulation Therapy Visit)        Stefany Huynh RN

## 2019-01-09 ENCOUNTER — TELEPHONE (OUTPATIENT)
Dept: FAMILY MEDICINE | Facility: OTHER | Age: 59
End: 2019-01-09

## 2019-01-09 ENCOUNTER — ANTICOAGULATION THERAPY VISIT (OUTPATIENT)
Dept: FAMILY MEDICINE | Facility: OTHER | Age: 59
End: 2019-01-09
Payer: COMMERCIAL

## 2019-01-09 DIAGNOSIS — Z86.711 HISTORY OF PULMONARY EMBOLISM: ICD-10-CM

## 2019-01-09 DIAGNOSIS — Z86.718 HISTORY OF DEEP VENOUS THROMBOSIS: ICD-10-CM

## 2019-01-09 LAB — INR PPP: 1.9

## 2019-01-09 NOTE — TELEPHONE ENCOUNTER
Pt has been discharged from hospital and is so weak she can't leave the house.  [facility] calls, requesting an order for PT for strengthening 2 times weekly for 3 weeks.  Also to continue skilled nursing 1 time weekly

## 2019-01-09 NOTE — TELEPHONE ENCOUNTER
10:03 AM    Reason for Call: Phone Call- verbal orders     Description: Fernandez from [facility] is needing verbal orders from a Doctor  Per Medicare to extend patient services and start physical therapy.     Was an appointment offered for this call? No  If yes : Appointment type              Date    Preferred method for responding to this message: Telephone Call  What is your phone number ?[phone]    If we cannot reach you directly, may we leave a detailed response at the number you provided? Yes    Can this message wait until your PCP/provider returns, if available today? no    Malia Ramirez MA

## 2019-01-09 NOTE — PROGRESS NOTES
ANTICOAGULATION FOLLOW-UP CLINIC VISIT    Patient Name:  Frances Corea  Date:  2019  Contact Type:  Telephone/ spoke to homecare nurseDior    SUBJECTIVE:     Patient Findings     Positives:   No Problem Findings    Comments:   INR done by homecare nurse and result called to warfarin clinic. Per nurse report, patient has no bleeding/bruising and no new changes in diet/meds/activity. We discussed INR result, new warfarin dosing and INR recheck date. Nurse verbalized understanding and has no questions.            OBJECTIVE    INR   Date Value Ref Range Status   2019 1.9  Final       ASSESSMENT / PLAN  INR assessment THER    Recheck INR In: 1 WEEK    INR Location Homecare INR      Anticoagulation Summary  As of 2019    INR goal:   2.0-3.0   TTR:   36.7 % (1 y)   INR used for dosin.9! (2019)   Warfarin maintenance plan:   5 mg (5 mg x 1) every day   Full warfarin instructions:   5 mg every day   Weekly warfarin total:   35 mg   No change documented:   Stefany Huynh RN   Plan last modified:   Stefany Huynh RN (2018)   Next INR check:   2019   Target end date:   Indefinite    Indications    History of deep venous thrombosis [Z86.718]  History of pulmonary embolism [Z86.711]  Long-term (current) use of anticoagulants [Z79.01] (Resolved) [Z79.01]             Anticoagulation Episode Summary     INR check location:       Preferred lab:       Send INR reminders to:    ANTICOAG POOL    Comments:   Fernandez - Ranjit  visits M/W; returned from [facility] on ; was taking 5mg daily w/therapeutic INR's per records      Anticoagulation Care Providers     Provider Role Specialty Phone number    Ayah Rojas NP [facility] [phone]            See the Encounter Report to view Anticoagulation Flowsheet and Dosing Calendar (Go to Encounters tab in chart review, and find the Anticoagulation Therapy Visit)        Stefany Huynh RN

## 2019-01-09 NOTE — TELEPHONE ENCOUNTER
They need to let us know exactly what they need, then order can be written and sent, or done in letter form and sent    Ayah JOY  [phone]

## 2019-01-16 ENCOUNTER — ANTICOAGULATION THERAPY VISIT (OUTPATIENT)
Dept: ANTICOAGULATION | Facility: OTHER | Age: 59
End: 2019-01-16
Payer: COMMERCIAL

## 2019-01-16 DIAGNOSIS — Z86.711 HISTORY OF PULMONARY EMBOLISM: ICD-10-CM

## 2019-01-16 DIAGNOSIS — Z86.718 HISTORY OF DEEP VENOUS THROMBOSIS: ICD-10-CM

## 2019-01-16 LAB — INR PPP: 1.4

## 2019-01-16 NOTE — PROGRESS NOTES
ANTICOAGULATION FOLLOW-UP CLINIC VISIT    Patient Name:  Frances Corea  Date:  2019  Contact Type:  Telephone/ spoke to homecare nurse.    SUBJECTIVE:     Patient Findings     Positives:   Change in diet/appetite    Comments:   Call from homecare nurse and message left on voicmail re: INR result, warfarin dosing and INR recheck date. Call return to homecare nurse and we discussed warfarin dosing, INR recheck date. Nurse verbalized understanding and has no questions. Patient is eating salads daily per homecare nurse report (she did that prior to hospitalization also). Patient has no bleeding/bruising and no new changes in meds/activity           OBJECTIVE    INR   Date Value Ref Range Status   2019 1.4  Final       ASSESSMENT / PLAN  INR assessment SUB significant increase in vit K intake   Recheck INR In: 1 WEEK    INR Location Homecare INR      Anticoagulation Summary  As of 2019    INR goal:   2.0-3.0   TTR:   36.0 % (1 y)   INR used for dosin.4! (2019)   Warfarin maintenance plan:   10 mg (5 mg x 2) every Mon, Wed, Fri; 5 mg (5 mg x 1) all other days   Full warfarin instructions:   : 12.5 mg; Otherwise 10 mg every Mon, Wed, Fri; 5 mg all other days   Weekly warfarin total:   50 mg   Plan last modified:   Stefany Huynh RN (2019)   Next INR check:   2019   Target end date:   Indefinite    Indications    History of deep venous thrombosis [Z86.718]  History of pulmonary embolism [Z86.711]  Long-term (current) use of anticoagulants [Z79.01] (Resolved) [Z79.01]             Anticoagulation Episode Summary     INR check location:       Preferred lab:       Send INR reminders to:    ANTICOAG POOL    Comments:   Fernandez - [facility] visits W; phone        Anticoagulation Care Providers     Provider Role Specialty Phone number    Ayah Rojas NP [facility] [phone]            See the Encounter Report to view Anticoagulation Flowsheet and Dosing  Calendar (Go to Encounters tab in chart review, and find the Anticoagulation Therapy Visit)        Stefany Huynh RN

## 2019-01-17 DIAGNOSIS — M25.562 LEFT KNEE PAIN, UNSPECIFIED CHRONICITY: ICD-10-CM

## 2019-01-18 RX ORDER — TRAMADOL HYDROCHLORIDE 50 MG/1
TABLET ORAL
Qty: 60 TABLET | Refills: 0 | Status: SHIPPED | OUTPATIENT
Start: 2019-01-18 | End: 2019-02-14

## 2019-01-23 ENCOUNTER — ANTICOAGULATION THERAPY VISIT (OUTPATIENT)
Dept: ANTICOAGULATION | Facility: OTHER | Age: 59
End: 2019-01-23
Payer: COMMERCIAL

## 2019-01-23 DIAGNOSIS — Z86.711 HISTORY OF PULMONARY EMBOLISM: ICD-10-CM

## 2019-01-23 DIAGNOSIS — Z86.718 HISTORY OF DEEP VENOUS THROMBOSIS: ICD-10-CM

## 2019-01-23 LAB — INR PPP: 3.3

## 2019-01-23 NOTE — PROGRESS NOTES
ANTICOAGULATION FOLLOW-UP CLINIC VISIT    Patient Name:  Frances Corea  Date:  1/23/2019  Contact Type:  Telephone/ spoke to homecare nurseFernandez    SUBJECTIVE:     Patient Findings     Positives:   Change in diet/appetite    Comments:   INR done by homecare nurse and message left on warfarin clinic voicemail. Call returned to home nurse and we discussed INR result, warfarin dosing and INR recheck date. Homecare nurse verbalized understanding of instruction and has no questions. She states patient is eating increased amounts of vit K food. Possibly done with home therapy and homecare nurse next week but will let me know.            OBJECTIVE    INR   Date Value Ref Range Status   01/23/2019 3.3  Final       ASSESSMENT / PLAN  INR assessment SUPRA    Recheck INR In: 1 WEEK    INR Location Homecare INR      Anticoagulation Summary  As of 1/23/2019    INR goal:   2.0-3.0   TTR:   36.3 % (1 y)   INR used for dosing:   3.3! (1/23/2019)   Warfarin maintenance plan:   10 mg (5 mg x 2) every Mon, Wed, Fri; 5 mg (5 mg x 1) all other days   Full warfarin instructions:   1/23: 7.5 mg; 1/30: 5 mg; Otherwise 10 mg every Mon, Wed, Fri; 5 mg all other days   Weekly warfarin total:   50 mg   Plan last modified:   Stefany Huynh RN (1/16/2019)   Next INR check:   1/30/2019   Target end date:   Indefinite    Indications    History of deep venous thrombosis [Z86.718]  History of pulmonary embolism [Z86.711]  Long-term (current) use of anticoagulants [Z79.01] (Resolved) [Z79.01]             Anticoagulation Episode Summary     INR check location:       Preferred lab:       Send INR reminders to:    ANTICOAG POOL    Comments:   Fernandez Church  visits W; phone        Anticoagulation Care Providers     Provider Role Specialty Phone number    Ayah Rojas NP [facility] [phone]            See the Encounter Report to view Anticoagulation Flowsheet and Dosing Calendar (Go to Encounters tab in chart  review, and find the Anticoagulation Therapy Visit)        Stefany Huynh RN

## 2019-01-30 ENCOUNTER — ANTICOAGULATION THERAPY VISIT (OUTPATIENT)
Dept: ANTICOAGULATION | Facility: OTHER | Age: 59
End: 2019-01-30

## 2019-01-30 ENCOUNTER — TELEPHONE (OUTPATIENT)
Dept: FAMILY MEDICINE | Facility: OTHER | Age: 59
End: 2019-01-30

## 2019-01-30 DIAGNOSIS — Z86.711 HISTORY OF PULMONARY EMBOLISM: ICD-10-CM

## 2019-01-30 DIAGNOSIS — Z86.718 HISTORY OF DEEP VENOUS THROMBOSIS: ICD-10-CM

## 2019-01-30 LAB — INR PPP: 2.8

## 2019-01-30 NOTE — TELEPHONE ENCOUNTER
3:06 PM    Reason for Call: Phone Call    Description: Fernandez ingram nurse from [facility] called to get a verbal order to discontinue all services.  Patient has completed PT.    Was an appointment offered for this call? No  If yes : Appointment type              Date    Preferred method for responding to this message: Telephone Call  What is your phone number ?    If we cannot reach you directly, may we leave a detailed response at the number you provided? Yes    Can this message wait until your PCP/provider returns, if available today? YES,     Ally Franco LPN

## 2019-02-12 ENCOUNTER — ANTICOAGULATION THERAPY VISIT (OUTPATIENT)
Dept: ANTICOAGULATION | Facility: OTHER | Age: 59
End: 2019-02-12
Attending: NURSE PRACTITIONER
Payer: MEDICARE

## 2019-02-12 DIAGNOSIS — I26.99 PULMONARY EMBOLISM AND INFARCTION (H): ICD-10-CM

## 2019-02-12 DIAGNOSIS — Z86.718 HISTORY OF DEEP VENOUS THROMBOSIS: ICD-10-CM

## 2019-02-12 DIAGNOSIS — F25.1 SCHIZOPHRENIA, SCHIZO-AFFECTIVE TYPE, DEPRESSED (H): Primary | ICD-10-CM

## 2019-02-12 DIAGNOSIS — Z79.01 LONG TERM CURRENT USE OF ANTICOAGULANT THERAPY: ICD-10-CM

## 2019-02-12 DIAGNOSIS — Z79.899 NEED FOR PROPHYLACTIC CHEMOTHERAPY: ICD-10-CM

## 2019-02-12 DIAGNOSIS — Z86.711 HISTORY OF PULMONARY EMBOLISM: ICD-10-CM

## 2019-02-12 LAB
ALBUMIN SERPL-MCNC: 3.6 G/DL (ref 3.4–5)
ALP SERPL-CCNC: 107 U/L (ref 40–150)
ALT SERPL W P-5'-P-CCNC: 21 U/L (ref 0–50)
ANION GAP SERPL CALCULATED.3IONS-SCNC: 8 MMOL/L (ref 3–14)
AST SERPL W P-5'-P-CCNC: 11 U/L (ref 0–45)
BASOPHILS # BLD AUTO: 0 10E9/L (ref 0–0.2)
BASOPHILS NFR BLD AUTO: 0.3 %
BILIRUB DIRECT SERPL-MCNC: 0.2 MG/DL (ref 0–0.2)
BILIRUB SERPL-MCNC: 0.4 MG/DL (ref 0.2–1.3)
BUN SERPL-MCNC: 12 MG/DL (ref 7–30)
CALCIUM SERPL-MCNC: 9.5 MG/DL (ref 8.5–10.1)
CHLORIDE SERPL-SCNC: 108 MMOL/L (ref 94–109)
CHOLEST SERPL-MCNC: 161 MG/DL
CO2 SERPL-SCNC: 26 MMOL/L (ref 20–32)
CREAT SERPL-MCNC: 0.69 MG/DL (ref 0.52–1.04)
DIFFERENTIAL METHOD BLD: ABNORMAL
EOSINOPHIL # BLD AUTO: 0 10E9/L (ref 0–0.7)
EOSINOPHIL NFR BLD AUTO: 0.3 %
ERYTHROCYTE [DISTWIDTH] IN BLOOD BY AUTOMATED COUNT: 17.2 % (ref 10–15)
EST. AVERAGE GLUCOSE BLD GHB EST-MCNC: 108 MG/DL
GFR SERPL CREATININE-BSD FRML MDRD: >90 ML/MIN/{1.73_M2}
GLUCOSE SERPL-MCNC: 121 MG/DL (ref 70–99)
HBA1C MFR BLD: 5.4 % (ref 0–5.6)
HCT VFR BLD AUTO: 43.9 % (ref 35–47)
HDLC SERPL-MCNC: 58 MG/DL
HGB BLD-MCNC: 13 G/DL (ref 11.7–15.7)
INR BLD: 2.2 (ref 0.86–1.14)
LDLC SERPL CALC-MCNC: 84 MG/DL
LITHIUM SERPL-SCNC: 0.82 MMOL/L (ref 0.6–1.2)
LYMPHOCYTES # BLD AUTO: 0.7 10E9/L (ref 0.8–5.3)
LYMPHOCYTES NFR BLD AUTO: 10.9 %
MCH RBC QN AUTO: 25.8 PG (ref 26.5–33)
MCHC RBC AUTO-ENTMCNC: 29.6 G/DL (ref 31.5–36.5)
MCV RBC AUTO: 87 FL (ref 78–100)
MONOCYTES # BLD AUTO: 0.2 10E9/L (ref 0–1.3)
MONOCYTES NFR BLD AUTO: 3.4 %
NEUTROPHILS # BLD AUTO: 5.8 10E9/L (ref 1.6–8.3)
NEUTROPHILS NFR BLD AUTO: 85.1 %
NONHDLC SERPL-MCNC: 103 MG/DL
PLATELET # BLD AUTO: 127 10E9/L (ref 150–450)
POTASSIUM SERPL-SCNC: 3.9 MMOL/L (ref 3.4–5.3)
PROT SERPL-MCNC: 7.5 G/DL (ref 6.8–8.8)
RBC # BLD AUTO: 5.04 10E12/L (ref 3.8–5.2)
SODIUM SERPL-SCNC: 142 MMOL/L (ref 133–144)
T3 SERPL-MCNC: 99 NG/DL (ref 60–181)
T4 FREE SERPL-MCNC: 0.96 NG/DL (ref 0.76–1.46)
TRIGL SERPL-MCNC: 93 MG/DL
TSH SERPL DL<=0.005 MIU/L-ACNC: 2.92 MU/L (ref 0.4–4)
WBC # BLD AUTO: 6.8 10E9/L (ref 4–11)

## 2019-02-12 PROCEDURE — 85610 PROTHROMBIN TIME: CPT | Mod: QW,ZL | Performed by: NURSE PRACTITIONER

## 2019-02-12 PROCEDURE — 85025 COMPLETE CBC W/AUTO DIFF WBC: CPT | Mod: ZL | Performed by: NURSE PRACTITIONER

## 2019-02-12 PROCEDURE — 40000788 ZZHCL STATISTIC ESTIMATED AVERAGE GLUCOSE: Mod: ZL | Performed by: NURSE PRACTITIONER

## 2019-02-12 PROCEDURE — 84443 ASSAY THYROID STIM HORMONE: CPT | Mod: ZL | Performed by: NURSE PRACTITIONER

## 2019-02-12 PROCEDURE — 99000 SPECIMEN HANDLING OFFICE-LAB: CPT | Performed by: NURSE PRACTITIONER

## 2019-02-12 PROCEDURE — 84439 ASSAY OF FREE THYROXINE: CPT | Mod: ZL | Performed by: NURSE PRACTITIONER

## 2019-02-12 PROCEDURE — 80048 BASIC METABOLIC PNL TOTAL CA: CPT | Mod: ZL | Performed by: NURSE PRACTITIONER

## 2019-02-12 PROCEDURE — 36416 COLLJ CAPILLARY BLOOD SPEC: CPT | Mod: ZL | Performed by: NURSE PRACTITIONER

## 2019-02-12 PROCEDURE — 83036 HEMOGLOBIN GLYCOSYLATED A1C: CPT | Mod: ZL | Performed by: NURSE PRACTITIONER

## 2019-02-12 PROCEDURE — 84480 ASSAY TRIIODOTHYRONINE (T3): CPT | Mod: ZL | Performed by: NURSE PRACTITIONER

## 2019-02-12 PROCEDURE — 80076 HEPATIC FUNCTION PANEL: CPT | Mod: ZL | Performed by: NURSE PRACTITIONER

## 2019-02-12 PROCEDURE — 80061 LIPID PANEL: CPT | Mod: ZL | Performed by: NURSE PRACTITIONER

## 2019-02-12 PROCEDURE — 80178 ASSAY OF LITHIUM: CPT | Mod: ZL | Performed by: NURSE PRACTITIONER

## 2019-02-12 NOTE — RESULT ENCOUNTER NOTE
Does she have an upcoming appt? Did I order these?    [facility]  [phone]

## 2019-02-12 NOTE — PROGRESS NOTES
ANTICOAGULATION FOLLOW-UP CLINIC VISIT    Patient Name:  Frances Corea  Date:  2019  Contact Type:  Telephone/ spoke to patient Frances silverio    SUBJECTIVE:     Patient Findings     Positives:   No Problem Findings    Comments:   INR done by lab. Call placed to patient Frances silverio and spoke to her re: INR result, warfarin dosing and INR recheck date. She verbalized understanding and has no questions. She states patient has no bleeding/bruising and no new changes in meds/activity. States patient is now back to eating her small salad daily.            OBJECTIVE    INR Point of Care   Date Value Ref Range Status   2019 2.2 (H) 0.86 - 1.14 Final     Comment:     This test is intended for monitoring Coumadin therapy.  Results are not   accurate in patients with prolonged INR due to factor deficiency.         ASSESSMENT / PLAN  INR assessment THER    Recheck INR In: 3 WEEKS    INR Location Clinic      Anticoagulation Summary  As of 2019    INR goal:   2.0-3.0   TTR:   38.5 % (1.1 y)   INR used for dosin.2 (2019)   Warfarin maintenance plan:   10 mg (5 mg x 2) every Mon, Wed, Fri; 5 mg (5 mg x 1) all other days   Full warfarin instructions:   10 mg every Mon, Wed, Fri; 5 mg all other days   Weekly warfarin total:   50 mg   No change documented:   Stefany Huynh RN   Plan last modified:   Stefany Huynh RN (2019)   Next INR check:   3/5/2019   Target end date:   Indefinite    Indications    History of deep venous thrombosis [Z86.718]  History of pulmonary embolism [Z86.711]  Long-term (current) use of anticoagulants [Z79.01] (Resolved) [Z79.01]             Anticoagulation Episode Summary     INR check location:       Preferred lab:       Send INR reminders to:    ANTICOAG POOL    Comments:   Fernandez - [facility] visits W; phone        Anticoagulation Care Providers     Provider Role Specialty Phone number    Ayah Rojas NP [facility] [phone]             See the Encounter Report to view Anticoagulation Flowsheet and Dosing Calendar (Go to Encounters tab in chart review, and find the Anticoagulation Therapy Visit)        Stefany Huynh RN

## 2019-02-14 DIAGNOSIS — M25.562 LEFT KNEE PAIN, UNSPECIFIED CHRONICITY: ICD-10-CM

## 2019-02-14 RX ORDER — TRAMADOL HYDROCHLORIDE 50 MG/1
TABLET ORAL
Qty: 60 TABLET | Refills: 0 | Status: SHIPPED | OUTPATIENT
Start: 2019-02-14 | End: 2019-03-20

## 2019-02-14 NOTE — TELEPHONE ENCOUNTER
traMADol (ULTRAM) 50 MG tablet      Last Written Prescription Date:  1/18/19  Last Fill Quantity: 60,   # refills: 0  Last Office Visit: 4/17/18  Future Office visit:    Next 5 appointments (look out 90 days)    Mar 05, 2019 10:00 AM CST  (Arrive by 9:45 AM)  Office Visit with Ayah Rojas NP  [facility] ([facility] [address]  [facility] [address]  [phone]           Routing refill request to provider for review/approval because:  Drug not on the [facility], [facility] or [facility] refill protocol or controlled substance

## 2019-02-27 ENCOUNTER — ANTICOAGULATION THERAPY VISIT (OUTPATIENT)
Dept: ANTICOAGULATION | Facility: OTHER | Age: 59
End: 2019-02-27
Attending: NURSE PRACTITIONER
Payer: MEDICARE

## 2019-02-27 DIAGNOSIS — Z86.711 HISTORY OF PULMONARY EMBOLISM: ICD-10-CM

## 2019-02-27 DIAGNOSIS — Z86.718 HISTORY OF DEEP VENOUS THROMBOSIS: ICD-10-CM

## 2019-02-27 DIAGNOSIS — I26.99 PULMONARY EMBOLISM AND INFARCTION (H): ICD-10-CM

## 2019-02-27 DIAGNOSIS — Z79.01 LONG TERM CURRENT USE OF ANTICOAGULANT THERAPY: ICD-10-CM

## 2019-02-27 LAB — INR BLD: 2 (ref 0.86–1.14)

## 2019-02-27 PROCEDURE — 36416 COLLJ CAPILLARY BLOOD SPEC: CPT | Mod: ZL | Performed by: NURSE PRACTITIONER

## 2019-02-27 PROCEDURE — 85610 PROTHROMBIN TIME: CPT | Mod: QW,ZL | Performed by: NURSE PRACTITIONER

## 2019-02-27 NOTE — PROGRESS NOTES
ANTICOAGULATION FOLLOW-UP CLINIC VISIT    Patient Name:  Frances Corea  Date:  2019  Contact Type:  Telephone/ spoke to patientFrances    SUBJECTIVE:     Patient Findings     Comments:   INR done by lab. Call placed to Frances re: INR result, warfarin dosing and INR recheck date. She repeated back instructions accurately and had no questions. She did say she is eating her small salad daily as she previously did.             OBJECTIVE    INR Point of Care   Date Value Ref Range Status   2019 2.0 (H) 0.86 - 1.14 Final     Comment:     This test is intended for monitoring Coumadin therapy.  Results are not   accurate in patients with prolonged INR due to factor deficiency.         ASSESSMENT / PLAN  INR assessment THER    Recheck INR In: 4 WEEKS    INR Location Clinic      Anticoagulation Summary  As of 2019    INR goal:   2.0-3.0   TTR:   40.7 % (1.1 y)   INR used for dosin.0 (2019)   Warfarin maintenance plan:   10 mg (5 mg x 2) every Mon, Wed, Fri; 5 mg (5 mg x 1) all other days   Full warfarin instructions:   10 mg every Mon, Wed, Fri; 5 mg all other days   Weekly warfarin total:   50 mg   No change documented:   Stefany Huynh RN   Plan last modified:   Stefany Huynh RN (2019)   Next INR check:   3/27/2019   Target end date:   Indefinite    Indications    History of deep venous thrombosis [Z86.718]  History of pulmonary embolism [Z86.711]  Long-term (current) use of anticoagulants [Z79.01] (Resolved) [Z79.01]             Anticoagulation Episode Summary     INR check location:       Preferred lab:       Send INR reminders to:   HC ANTICOAG POOL    Comments:   Fern[facility] Church  visits W; phone        Anticoagulation Care Providers     Provider Role Specialty Phone number    Ayah Rojas NP [facility] [phone]            See the Encounter Report to view Anticoagulation Flowsheet and Dosing Calendar (Go to Encounters tab in chart review, and find  the Anticoagulation Therapy Visit)        Stefany Huynh RN

## 2019-03-20 ENCOUNTER — OFFICE VISIT (OUTPATIENT)
Dept: FAMILY MEDICINE | Facility: OTHER | Age: 59
End: 2019-03-20
Attending: NURSE PRACTITIONER
Payer: COMMERCIAL

## 2019-03-20 ENCOUNTER — ANTICOAGULATION THERAPY VISIT (OUTPATIENT)
Dept: ANTICOAGULATION | Facility: OTHER | Age: 59
End: 2019-03-20
Attending: NURSE PRACTITIONER
Payer: MEDICARE

## 2019-03-20 VITALS
WEIGHT: 280 LBS | BODY MASS INDEX: 40.18 KG/M2 | OXYGEN SATURATION: 93 % | HEART RATE: 81 BPM | DIASTOLIC BLOOD PRESSURE: 74 MMHG | TEMPERATURE: 99 F | SYSTOLIC BLOOD PRESSURE: 118 MMHG

## 2019-03-20 DIAGNOSIS — Z79.01 LONG TERM CURRENT USE OF ANTICOAGULANT THERAPY: ICD-10-CM

## 2019-03-20 DIAGNOSIS — J98.9 REACTIVE AIRWAY DISEASE THAT IS NOT ASTHMA: Primary | ICD-10-CM

## 2019-03-20 DIAGNOSIS — Z86.718 HISTORY OF DEEP VENOUS THROMBOSIS: ICD-10-CM

## 2019-03-20 DIAGNOSIS — I10 BENIGN ESSENTIAL HYPERTENSION: ICD-10-CM

## 2019-03-20 DIAGNOSIS — Z86.711 HISTORY OF PULMONARY EMBOLISM: ICD-10-CM

## 2019-03-20 DIAGNOSIS — M25.562 LEFT KNEE PAIN, UNSPECIFIED CHRONICITY: ICD-10-CM

## 2019-03-20 DIAGNOSIS — F31.81 BIPOLAR 2 DISORDER (H): ICD-10-CM

## 2019-03-20 DIAGNOSIS — I26.99 PULMONARY EMBOLISM AND INFARCTION (H): ICD-10-CM

## 2019-03-20 DIAGNOSIS — E78.5 HYPERLIPIDEMIA WITH TARGET LDL LESS THAN 100: ICD-10-CM

## 2019-03-20 LAB — INR BLD: 1.8 (ref 0.86–1.14)

## 2019-03-20 PROCEDURE — 36416 COLLJ CAPILLARY BLOOD SPEC: CPT | Mod: ZL | Performed by: NURSE PRACTITIONER

## 2019-03-20 PROCEDURE — 99215 OFFICE O/P EST HI 40 MIN: CPT | Performed by: NURSE PRACTITIONER

## 2019-03-20 PROCEDURE — G0463 HOSPITAL OUTPT CLINIC VISIT: HCPCS

## 2019-03-20 PROCEDURE — 85610 PROTHROMBIN TIME: CPT | Mod: QW,ZL | Performed by: NURSE PRACTITIONER

## 2019-03-20 RX ORDER — SIMVASTATIN 40 MG
40 TABLET ORAL AT BEDTIME
Qty: 90 TABLET | Refills: 1 | Status: SHIPPED | OUTPATIENT
Start: 2019-03-20 | End: 2020-04-29

## 2019-03-20 RX ORDER — NITROGLYCERIN 0.4 MG/1
0.4 TABLET SUBLINGUAL PRN
COMMUNITY
Start: 2018-12-21

## 2019-03-20 RX ORDER — ZOLPIDEM TARTRATE 10 MG/1
10 TABLET ORAL AT BEDTIME
Refills: 2 | COMMUNITY
Start: 2019-02-21

## 2019-03-20 RX ORDER — OLANZAPINE 20 MG/1
1 TABLET ORAL 2 TIMES DAILY
Refills: 2 | COMMUNITY
Start: 2019-02-22

## 2019-03-20 RX ORDER — NYSTATIN 100000 [USP'U]/G
1 POWDER TOPICAL 2 TIMES DAILY
COMMUNITY
Start: 2018-09-04

## 2019-03-20 RX ORDER — TRAMADOL HYDROCHLORIDE 50 MG/1
TABLET ORAL
Qty: 60 TABLET | Refills: 5 | Status: SHIPPED | OUTPATIENT
Start: 2019-03-20 | End: 2019-07-07

## 2019-03-20 RX ORDER — FAMOTIDINE 20 MG/1
20 TABLET, FILM COATED ORAL DAILY
COMMUNITY
Start: 2018-12-21

## 2019-03-20 RX ORDER — ALBUTEROL SULFATE 0.83 MG/ML
2.5 SOLUTION RESPIRATORY (INHALATION) EVERY 6 HOURS PRN
Qty: 1 BOX | Refills: 3 | Status: SHIPPED | OUTPATIENT
Start: 2019-03-20 | End: 2019-10-07

## 2019-03-20 RX ORDER — FAMOTIDINE 20 MG
1 TABLET ORAL DAILY
COMMUNITY

## 2019-03-20 RX ORDER — PRAZOSIN HYDROCHLORIDE 1 MG/1
1 CAPSULE ORAL 2 TIMES DAILY
COMMUNITY
Start: 2018-12-21 | End: 2019-04-09

## 2019-03-20 ASSESSMENT — ANXIETY QUESTIONNAIRES
GAD7 TOTAL SCORE: 6
6. BECOMING EASILY ANNOYED OR IRRITABLE: NOT AT ALL
1. FEELING NERVOUS, ANXIOUS, OR ON EDGE: MORE THAN HALF THE DAYS
3. WORRYING TOO MUCH ABOUT DIFFERENT THINGS: SEVERAL DAYS
5. BEING SO RESTLESS THAT IT IS HARD TO SIT STILL: NOT AT ALL
7. FEELING AFRAID AS IF SOMETHING AWFUL MIGHT HAPPEN: SEVERAL DAYS
2. NOT BEING ABLE TO STOP OR CONTROL WORRYING: SEVERAL DAYS

## 2019-03-20 ASSESSMENT — PATIENT HEALTH QUESTIONNAIRE - PHQ9
SUM OF ALL RESPONSES TO PHQ QUESTIONS 1-9: 6
5. POOR APPETITE OR OVEREATING: SEVERAL DAYS

## 2019-03-20 ASSESSMENT — PAIN SCALES - GENERAL: PAINLEVEL: NO PAIN (0)

## 2019-03-20 NOTE — NURSING NOTE
"Chief Complaint   Patient presents with     [facility] F/U     Mental Health       Initial /74 (BP Location: Right arm, Patient Position: Sitting, Cuff Size: Adult Large)   Pulse 81   Temp 99  F (37.2  C) (Tympanic)   Wt 127 kg (280 lb)   SpO2 93%   BMI 40.18 kg/m   Estimated body mass index is 40.18 kg/m  as calculated from the following:    Height as of 12/26/17: 1.778 m (5' 10\").    Weight as of this encounter: 127 kg (280 lb).  Medication Reconciliation: complete    Niesha Mortensen LPN  "

## 2019-03-20 NOTE — PROGRESS NOTES
SUBJECTIVE:   Frances Corea is a 58 year old female who presents to clinic today for the following health issues:      Hospital Follow-up Visit:    Hospital/Nursing Home/ Rehab Facility: [facility]  Date of Admission: 12/13/19  Date of Discharge: 12/21/19  Reason(s) for Admission: Mental Health Concerns            Problems taking medications regularly:  None       Medication changes since discharge: Patient has all medications with today.       Problems adhering to non-medication therapy:  None    Summary of hospitalization:  Discharge summary reviewed  Diagnostic Tests/Treatments reviewed.  Follow up needed: none  Other Healthcare Providers Involved in Patient s Care:         Psychiatry  Update since discharge: improved.     Post Discharge Medication Reconciliation: discharge medications reconciled, continue medications without change.  Plan of care communicated with patient and family               Depression Follow-up - Bipolar II, schizophrenia    Status since last visit: stable after 4 month mental health commitment    See PHQ-9 for current symptoms.  Other associated symptoms: None    Complicating factors:   Significant life event:  No   Current substance abuse:  None  Anxiety or Panic symptoms:  No      4 moth inpatient psychiatric stay in [place], 23 ECT treatments.  She is home, needed some conditioning due to loss of strength after her long tern stay.   Had PT.  Is following with Delores Payan for mental health medication management        PHQ 12/26/2017 4/17/2018 3/20/2019   PHQ-9 Total Score 5 7 6   Q9: Suicide Ideation Not at all Not at all Not at all     PHQ-9  English  PHQ-9   Any Language  Suicide Assessment Five-step Evaluation and Treatment (SAFE-T)        Amount of exercise or physical activity: None    Problems taking medications regularly: No    Medication side effects: none    Diet: low fat/cholesterol        Hyperlipidemia Follow-Up    Rate your low fat/cholesterol  diet?: good    Taking statin?  Yes, no muscle aches from statin    Other lipid medications/supplements?:  none      Hypertension Follow-up    Outpatient blood pressures are not being checked.    Low Salt Diet: no added salt      Hypothyroidism Follow-up  Since last visit, patient describes the following symptoms: Weight stable, no hair loss, no skin changes, no constipation, no loose stools      Chronic Pain Follow-Up       Type / Location of Pain: Knees, back  Analgesia/pain control:       Recent changes:  same      Overall control: Tolerable with discomfort  Activity level/function:      Daily activities:  Can do most things most days, with some rest    Work:  not applicable  Adverse effects:  No  Adherance    Taking medication as directed?  Yes    Participating in other treatments: yes  Risk Factors:    Sleep:  Fair    Mood/anxiety:  controlled    Recent family or social stressors:  Mental health concerns    Other aggravating factors: none       PHQ-9 SCORE 8/8/2017 12/26/2017 4/17/2018   PHQ-9 Total Score 8 5 7     CHAVEZ-7 SCORE 12/30/2016 8/8/2017 4/17/2018   Total Score 0 7 7     Encounter-Level CSA:    There are no encounter-level csa.     Patient-Level CSA:    There are no patient-level csa.             Seasonal Allergies - uses a nebulizer treatment PRN and OTC allergy med - Zyrtec 10 mg daily      Problem list and histories reviewed & adjusted, as indicated.  Additional history: as documented    Patient Active Problem List   Diagnosis     Undifferentiated schizophrenia (H)     History of pulmonary embolism     Chronic anticoagulation     Hyperlipidemia with target LDL less than 100     Gastroesophageal reflux disease without esophagitis     Anxiety     Primary insomnia     Psoriasis     Bipolar 2 disorder (H)     ACP (advance care planning)     Benign essential hypertension     History of deep venous thrombosis     Morbid obesity (H)     Encounter for monitoring statin therapy     Aspirin contraindicated      Acquired hypothyroidism     BiPAP (biphasic positive airway pressure) dependence     On home oxygen therapy - 3L at hs     Vitamin D deficiency     Encounter for lithium monitoring     Chronic pain of both knees     Reactive airway disease that is not asthma     Chronic fatigue     RADHA on CPAP     Schizoaffective disorder, chronic condition with acute exacerbation (H)     Reactive airway disease     Pulmonary embolism (H)     Dysphagia as late effect of cerebrovascular disease     Borderline personality disorder (H)     Past Surgical History:   Procedure Laterality Date     GYN SURGERY      total hyst     HC ECP WITH CATARACT SURGERY      lt eye     HC OR OCULAR DEVICE INTRAOP DETACHED RETINA       HYSTERECTOMY, PAP NO LONGER INDICATED N/A      REPAIR LACERATION HAND  9/28/2013    Procedure: REPAIR LACERATION HAND;  repair left fifth finger laceration;  Surgeon: Miranda Xavier DO;  Location: [facility] OR       Social History     Tobacco Use     Smoking status: Never Smoker     Smokeless tobacco: Never Used   Substance Use Topics     Alcohol use: No     Family History   Problem Relation Age of Onset     Ovarian Cancer Mother      Cerebrovascular Disease Father          Current Outpatient Medications   Medication Sig Dispense Refill     Acetaminophen (TYLENOL PO) Take 500 mg by mouth       albuterol (PROVENTIL) (2.5 MG/3ML) 0.083% neb solution Take 1 vial (2.5 mg) by nebulization Twice daily 1 Box 3     famotidine (PEPCID) 20 MG tablet Take 20 mg by mouth daily       latanoprost (XALATAN) 0.005 % ophthalmic solution Place 1 drop into both eyes At Bedtime       lithium (ESKALITH) 600 MG capsule Take 600 mg by mouth       lithium 300 MG tablet Take 300 mg by mouth Take 1tab every  am and 2pm, 2 tabs at HS       LORAZEPAM PO Take 0.5 mg by mouth 1-2 twice daily as needed       MELATONIN PO Take 3 mg by mouth At Bedtime Pt takes at 8 PM       methimazole (TAPAZOLE) 5 MG tablet TK 1 T PO QD  5     mometasone (ELOCON) 0.1 %  cream APPLY SPARINGLY EXTERNALLY TO THE AFFECTED AREA TWICE DAILY AS NEEDED. DO NOT APPLY TO FACE 45 g 0     Multiple Vitamins-Minerals (MULTIVITAMIN & MINERAL PO) Take by mouth daily        nitroGLYcerin (NITROSTAT) 0.4 MG sublingual tablet Place 0.4 mg under the tongue as needed 1 tab SL every 5 minutes PRN for angina as directed by physician       nystatin (MYCOSTATIN) 724436 UNIT/GM external powder Apply 1 Dose topically 2 times daily       OLANZapine (ZYPREXA) 20 MG tablet Take 1 tablet by mouth At Bedtime  2     order for DME Equipment being ordered: Neb machine and tubing 1 Device 0     order for DME Equipment being ordered: Bipap and supplies - states it has been 5 years 1 Device 0     order for DME Wheeled walker 1 Device 0     prazosin (MINIPRESS) 1 MG capsule Take 1 mg by mouth 2 times daily       sennosides (SENOKOT) 8.6 MG tablet Take 2 tablets by mouth daily as needed for constipation (Patient taking differently: Take 3 tablets by mouth daily ) 120 tablet 1     simvastatin (ZOCOR) 40 MG tablet TAKE 1 TABLET BY MOUTH EVERY NIGHT AT BEDTIME 90 tablet 1     thioridazine (MELLARIL) 100 MG tablet Take 200 mg by mouth daily 2 (100mg) tabs every AM  2     traMADol (ULTRAM) 50 MG tablet TAKE 1 TO 2 TABLETS BY MOUTH EVERY 6 HOURS AS NEEDED FOR PAIN. MAX OF 6 TABLETS DAILY 60 tablet 0     UNABLE TO FIND Home nebulizer       Vitamin D, Cholecalciferol, 1000 units CAPS Take 1 capsule by mouth daily       warfarin (COUMADIN) 5 MG tablet Take 5mg daily or as directed by [facility] 90 tablet 3     zolpidem (AMBIEN) 10 MG tablet Take 10 mg by mouth At Bedtime  2     clobetasol (TEMOVATE) 0.05 % ointment APPLY TOPICALLY TWICE DAILY ON THE BACK OF THE NECK AND HAIRLINE (Patient not taking: Reported on 3/20/2019) 60 g 0     triamcinolone (KENALOG) 0.1 % external ointment APPLY TOPICALLY TWICE DAILY TO BACK OF NECK AND HAIRLINE 80 g 1     Allergies   Allergen Reactions     Depakote      Increased lfts and ammonia      Zyprexa Other (See Comments)     Increased lft and increased ammonia.     Adhesive Tape Rash     Levaquin      Recent Labs   Lab Test 02/12/19  1041 08/13/18  1340 04/24/18  1129 12/26/17  1508 08/25/17  1420   A1C 5.4  --   --  4.9 5.0   LDL 84  --  74 117* 112*   HDL 58  --  52 65 54   TRIG 93  --  130 153* 128   ALT 21 21 21 26 27   CR 0.69 0.77 0.53 0.56 0.53   GFRESTIMATED >90 77 >90 >90 >90   GFRESTBLACK >90 >90 >90 >90 >90   POTASSIUM 3.9 4.5 4.3 4.5 4.1   TSH 2.92  --  1.82  --  2.20      BP Readings from Last 3 Encounters:   03/20/19 118/74   08/14/18 122/68   04/17/18 104/70    Wt Readings from Last 3 Encounters:   03/20/19 127 kg (280 lb)   04/17/18 135.6 kg (299 lb)   12/26/17 134.3 kg (296 lb)                  Labs reviewed in EPIC    Reviewed and updated as needed this visit by clinical staff  Tobacco  Allergies  Meds  Med Hx  Surg Hx  Fam Hx  Soc Hx      Reviewed and updated as needed this visit by Provider         ROS:  Constitutional, HEENT, cardiovascular, pulmonary, GI, , musculoskeletal, neuro, skin, endocrine and psych systems are negative, except as otherwise noted.    OBJECTIVE:     /74 (BP Location: Right arm, Patient Position: Sitting, Cuff Size: Adult Large)   Pulse 81   Temp 99  F (37.2  C) (Tympanic)   Wt 127 kg (280 lb)   SpO2 93%   BMI 40.18 kg/m    Body mass index is 40.18 kg/m .     GENERAL: healthy, alert and no distress  NECK: no adenopathy, no asymmetry, masses, or scars and thyroid normal to palpation  RESP: lungs clear to auscultation - no rales, rhonchi or wheezes  CV: regular rate and rhythm, normal S1 S2, no S3 or S4, no murmur, click or rub, no peripheral edema and peripheral pulses strong  ABDOMEN: soft, nontender, no hepatosplenomegaly, no masses and bowel sounds normal  MS: back and knee stiffness, pain - usus a walker  SKIN: no suspicious lesions or rashes    Mental Status Assessment:  Constitutional: awake, alert, and no apparent  distress  Appearance:   Appropriate   Eye Contact:   Good   Psychomotor Behavior: Slowed since ECT  Attitude:   Cooperative   Orientation:   All  Speech   Rate / Production: Normal    Volume:  Normal   Mood:    baseline  Affect:    Mildly blunted   Thought Content:  Clear   Thought Form:  Coherent  Logical   Insight:    Good       Labs were completed in February elsewhere, record request has been made.         Visit time:     Over 40 minutes  spent with the patient.   Greater than 50% of our visit time was spent face to face and included patient education and counseling regarding current conditions.   Multiple changes with mental health med's - all are updated.  Reviewed circumstances of 4 month mental health commitment.   Memory loss from ECT treatments discussed, reassurance given  Lipids - low fat diet, Meds as directed  HTN - low salt diet, exercise  Medication management, and plan of care.    Detailed plan of care is provided, AVS printed  Visit Content:   Referrals: Continue Psychiatry care with Delores Payan  Diagnostic testing reviewed  Reviewed appropriate outside records  Lab testing reviewed  Any medication adjustment has been reviewed  Health Maintenance   Updated as appropriate.  Record review completed         ASSESSMENT/PLAN:       1. History of pulmonary embolism  - Follow-up with coumadin clinic as established  - simvastatin (ZOCOR) 40 MG tablet; Take 1 tablet (40 mg) by mouth At Bedtime  Dispense: 90 tablet; Refill: 1    2. Left knee pain, unspecified chronicity  - traMADol (ULTRAM) 50 MG tablet; TAKE 1 TO 2 TABLETS BY MOUTH EVERY 6 HOURS AS NEEDED FOR PAIN. MAX OF 6 TABLETS DAILY  Dispense: 60 tablet; Refill: 5    3. Reactive airway disease that is not asthma  - albuterol (PROVENTIL) (2.5 MG/3ML) 0.083% neb solution; Take 1 vial (2.5 mg) by nebulization every 6 hours as needed for shortness of breath / dyspnea or wheezing Twice daily  Dispense: 1 Box; Refill: 3    4. Hyperlipidemia with target LDL  less than 100  - Continue plan of care  - Zocor refilled  - Low fat diet    5. Benign essential hypertension  - Continue plan of care    6. Bipolar 2 disorder (H)  - Continue plan of care        Follow-up 6 months, sooner as needed      Aayh Rojas NP  [facility]

## 2019-03-20 NOTE — PATIENT INSTRUCTIONS
ASSESSMENT/PLAN:       1. History of pulmonary embolism  - Follow-up with coumadin clinic as established  - simvastatin (ZOCOR) 40 MG tablet; Take 1 tablet (40 mg) by mouth At Bedtime  Dispense: 90 tablet; Refill: 1    2. Left knee pain, unspecified chronicity  - traMADol (ULTRAM) 50 MG tablet; TAKE 1 TO 2 TABLETS BY MOUTH EVERY 6 HOURS AS NEEDED FOR PAIN. MAX OF 6 TABLETS DAILY  Dispense: 60 tablet; Refill: 5    3. Reactive airway disease that is not asthma  - albuterol (PROVENTIL) (2.5 MG/3ML) 0.083% neb solution; Take 1 vial (2.5 mg) by nebulization every 6 hours as needed for shortness of breath / dyspnea or wheezing Twice daily  Dispense: 1 Box; Refill: 3    4. Hyperlipidemia with target LDL less than 100  - Continue plan of care  - Zocor refilled  - Low fat diet    5. Benign essential hypertension  - Continue plan of care    6. Bipolar 2 disorder (H)  - Continue plan of care        Follow-up 6 months, sooner as needed      Ayah Rojas NP  [facility]

## 2019-03-20 NOTE — PROGRESS NOTES
ANTICOAGULATION FOLLOW-UP CLINIC VISIT    Patient Name:  Frances Corea  Date:  3/20/2019  Contact Type:  Telephone/ message left on Frances's (patient mother) voicemail    SUBJECTIVE:     Patient Findings     Comments:   INR done by lab. Call placed to patient mother, Frances, and message left re: INR result, wararin dosing and INR recheck date. Patient or her mother to call warfarin clinic if patient has any bleeding/bruising or changes in diet/meds/activity, or questions.            OBJECTIVE    INR Point of Care   Date Value Ref Range Status   2019 1.8 (H) 0.86 - 1.14 Final     Comment:     This test is intended for monitoring Coumadin therapy.  Results are not   accurate in patients with prolonged INR due to factor deficiency.         ASSESSMENT / PLAN  INR assessment SUB    Recheck INR In: 4 WEEKS    INR Location Clinic      Anticoagulation Summary  As of 3/20/2019    INR goal:   2.0-3.0   TTR:   38.7 % (1.2 y)   INR used for dosin.8! (3/20/2019)   Warfarin maintenance plan:   10 mg (5 mg x 2) every Mon, Wed, Fri; 5 mg (5 mg x 1) all other days   Full warfarin instructions:   3/20: 12.5 mg; Otherwise 10 mg every Mon, Wed, Fri; 5 mg all other days   Weekly warfarin total:   50 mg   Plan last modified:   Stefany Huynh RN (2019)   Next INR check:   2019   Target end date:   Indefinite    Indications    History of deep venous thrombosis [Z86.718]  History of pulmonary embolism [Z86.711]  Long-term (current) use of anticoagulants [Z79.01] (Resolved) [Z79.01]             Anticoagulation Episode Summary     INR check location:       Preferred lab:       Send INR reminders to:    ANTICOAG POOL    Comments:   [facility] visits W; phone        Anticoagulation Care Providers     Provider Role Specialty Phone number    Ayah Rojas NP [facility] [phone]            See the Encounter Report to view Anticoagulation Flowsheet and Dosing Calendar (Go to  Encounters tab in chart review, and find the Anticoagulation Therapy Visit)        Stefany Huynh RN

## 2019-03-21 DIAGNOSIS — R06.02 SOB (SHORTNESS OF BREATH): ICD-10-CM

## 2019-03-21 RX ORDER — ALBUTEROL SULFATE 0.83 MG/ML
SOLUTION RESPIRATORY (INHALATION)
Qty: 1080 ML | Refills: 0 | OUTPATIENT
Start: 2019-03-21

## 2019-03-21 ASSESSMENT — ANXIETY QUESTIONNAIRES: GAD7 TOTAL SCORE: 6

## 2019-04-09 DIAGNOSIS — G47.33 OSA (OBSTRUCTIVE SLEEP APNEA): ICD-10-CM

## 2019-04-09 RX ORDER — LISINOPRIL 10 MG/1
10 TABLET ORAL DAILY
Qty: 3 TABLET | Refills: 0 | COMMUNITY
Start: 2019-04-09 | End: 2020-05-15

## 2019-04-09 NOTE — TELEPHONE ENCOUNTER
Mother calls with update on medications per Delores Payan, discontinue Mellaril and increase Zyprexa to bid, discontinue Minipress and take Lisinopril 10mg daily.  Med list updated

## 2019-04-17 ENCOUNTER — TELEPHONE (OUTPATIENT)
Dept: FAMILY MEDICINE | Facility: OTHER | Age: 59
End: 2019-04-17

## 2019-04-17 ENCOUNTER — ANTICOAGULATION THERAPY VISIT (OUTPATIENT)
Dept: ANTICOAGULATION | Facility: OTHER | Age: 59
End: 2019-04-17
Attending: NURSE PRACTITIONER
Payer: MEDICARE

## 2019-04-17 DIAGNOSIS — G47.33 OSA (OBSTRUCTIVE SLEEP APNEA): ICD-10-CM

## 2019-04-17 DIAGNOSIS — Z86.711 HISTORY OF PULMONARY EMBOLISM: ICD-10-CM

## 2019-04-17 DIAGNOSIS — Z99.89 BIPAP (BIPHASIC POSITIVE AIRWAY PRESSURE) DEPENDENCE: Primary | ICD-10-CM

## 2019-04-17 DIAGNOSIS — Z86.718 HISTORY OF DEEP VENOUS THROMBOSIS: ICD-10-CM

## 2019-04-17 DIAGNOSIS — M25.562 LEFT KNEE PAIN, UNSPECIFIED CHRONICITY: ICD-10-CM

## 2019-04-17 DIAGNOSIS — I26.99 PULMONARY EMBOLISM AND INFARCTION (H): ICD-10-CM

## 2019-04-17 DIAGNOSIS — Z79.01 LONG TERM CURRENT USE OF ANTICOAGULANT THERAPY: ICD-10-CM

## 2019-04-17 LAB — INR BLD: 1.5 (ref 0.86–1.14)

## 2019-04-17 PROCEDURE — 36416 COLLJ CAPILLARY BLOOD SPEC: CPT | Mod: ZL | Performed by: NURSE PRACTITIONER

## 2019-04-17 PROCEDURE — 85610 PROTHROMBIN TIME: CPT | Mod: QW,ZL | Performed by: NURSE PRACTITIONER

## 2019-04-17 RX ORDER — TRAMADOL HYDROCHLORIDE 50 MG/1
TABLET ORAL
Qty: 60 TABLET | Refills: 5 | Status: CANCELLED | OUTPATIENT
Start: 2019-04-17

## 2019-04-17 NOTE — PROGRESS NOTES
ANTICOAGULATION FOLLOW-UP CLINIC VISIT    Patient Name:  Frances Corea  Date:  2019  Contact Type:  Telephone/ message left on patient mothers voicemail    SUBJECTIVE:     Patient Findings     Comments:   INR done by lab. Call placed to patient mother and message left re: INR result, warfarin dosing and INR recheck date. She is to call warfarin clinic if patient has any bleeding/bruising, changes in diet/meds/activity or questions           OBJECTIVE    INR Point of Care   Date Value Ref Range Status   2019 1.5 (H) 0.86 - 1.14 Final     Comment:     This test is intended for monitoring Coumadin therapy.  Results are not   accurate in patients with prolonged INR due to factor deficiency.         ASSESSMENT / PLAN  INR assessment SUB    Recheck INR In: 9 DAYS    INR Location Clinic      Anticoagulation Summary  As of 2019    INR goal:   2.0-3.0   TTR:   36.4 % (1.3 y)   INR used for dosin.5! (2019)   Warfarin maintenance plan:   10 mg (5 mg x 2) every Mon, Wed, Fri; 5 mg (5 mg x 1) all other days   Full warfarin instructions:   : 12.5 mg; : 8 mg; Otherwise 10 mg every Mon, Wed, Fri; 5 mg all other days   Weekly warfarin total:   50 mg   Plan last modified:   Stefany Huynh RN (2019)   Next INR check:   2019   Target end date:   Indefinite    Indications    History of deep venous thrombosis [Z86.718]  History of pulmonary embolism [Z86.711]  Long-term (current) use of anticoagulants [Z79.01] (Resolved) [Z79.01]             Anticoagulation Episode Summary     INR check location:       Preferred lab:       Send INR reminders to:    ANTICOAG POOL    Comments:   [facility] visits W; phone        Anticoagulation Care Providers     Provider Role Specialty Phone number    Ayah Rojas NP [facility] [phone]            See the Encounter Report to view Anticoagulation Flowsheet and Dosing Calendar (Go to Encounters tab in chart review,  and find the Anticoagulation Therapy Visit)        Stefany Huynh RN

## 2019-04-17 NOTE — TELEPHONE ENCOUNTER
3:15 PM    Cheri calling again and the visit notes that were sent do not mention Bipap in the notes and patient's benefit of Bipap and Medicare will not cover unless this is mentioned in the face to face note.      Please contact Cheri at [phone]

## 2019-04-17 NOTE — TELEPHONE ENCOUNTER
"Cheri with [facility] [employer] called to update provider that order for BiPap cannot be completed as written (order is missing setting in which the BiPap is to be set at) and due to patient having [employer], a Face to Face is required.  Please contact Cheri at [phone]    Niesha \"Erick\" KIM Mortensen    "

## 2019-04-24 NOTE — PROGRESS NOTES
SUBJECTIVE:   Frances Corea is a 58 year old female who presents to clinic today for the following   health issues:          RADHA - on Bi-pap  Due for supply renewal and face to face visit  Sleep study 1/2012    Duration: 5 years plus last was 2012    Description (location/character/radiation): uses at night     Intensity:  severe    Accompanying signs and symptoms: sleep apnea     History (similar episodes/previous evaluation): sleep study done fin 2012    Precipitating or alleviating factors: bi-pap    Therapies tried and outcome: bi-pap        Also needs her nebulizer renewed  Uses this for wheezing, reactive airway disease.      Amount of exercise or physical activity: None    Problems taking medications regularly: No    Medication side effects: none    Diet: regular (no restrictions)        Additional history: as documented    Reviewed  and updated as needed this visit by clinical staff  Tobacco  Allergies         Reviewed and updated as needed this visit by Provider         Patient Active Problem List   Diagnosis     Undifferentiated schizophrenia (H)     History of pulmonary embolism     Chronic anticoagulation     Hyperlipidemia with target LDL less than 100     Gastroesophageal reflux disease without esophagitis     Anxiety     Primary insomnia     Psoriasis     Bipolar 2 disorder (H)     ACP (advance care planning)     Benign essential hypertension     History of deep venous thrombosis     Morbid obesity (H)     Encounter for monitoring statin therapy     Aspirin contraindicated     Acquired hypothyroidism     BiPAP (biphasic positive airway pressure) dependence     On home oxygen therapy - 3L at hs     Vitamin D deficiency     Encounter for lithium monitoring     Chronic pain of both knees     Reactive airway disease that is not asthma     Chronic fatigue     RADHA on CPAP     Schizoaffective disorder, chronic condition with acute exacerbation (H)     Pulmonary embolism (H)     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     Borderline personality disorder (H)     Past Surgical History:   Procedure Laterality Date     GYN SURGERY      total hyst     HC ECP WITH CATARACT SURGERY      lt eye     HC OR OCULAR DEVICE INTRAOP DETACHED RETINA       HYSTERECTOMY, PAP NO LONGER INDICATED N/A      REPAIR LACERATION HAND  9/28/2013    Procedure: REPAIR LACERATION HAND;  repair left fifth finger laceration;  Surgeon: Miranda Xavier DO;  Location: [facility]       Social History     Tobacco Use     Smoking status: Never Smoker     Smokeless tobacco: Never Used   Substance Use Topics     Alcohol use: No     Family History   Problem Relation Age of Onset     Ovarian Cancer Mother      Cerebrovascular Disease Father          Current Outpatient Medications   Medication Sig Dispense Refill     Acetaminophen (TYLENOL PO) Take 500 mg by mouth       albuterol (PROVENTIL) (2.5 MG/3ML) 0.083% neb solution Take 1 vial (2.5 mg) by nebulization every 6 hours as needed for shortness of breath / dyspnea or wheezing Twice daily 1 Box 3     clobetasol (TEMOVATE) 0.05 % ointment APPLY TOPICALLY TWICE DAILY ON THE BACK OF THE NECK AND HAIRLINE 60 g 0     famotidine (PEPCID) 20 MG tablet Take 20 mg by mouth daily       latanoprost (XALATAN) 0.005 % ophthalmic solution Place 1 drop into both eyes At Bedtime       lisinopril (PRINIVIL/ZESTRIL) 10 MG tablet Take 1 tablet (10 mg) by mouth daily 3 tablet 0     lithium (ESKALITH) 600 MG capsule Take 600 mg by mouth       lithium 300 MG tablet Take 300 mg by mouth Take 1tab every  am and 2pm, 2 tabs at HS       LORAZEPAM PO Take 0.5 mg by mouth 1-2 twice daily as needed       MELATONIN PO Take 3 mg by mouth At Bedtime Pt takes at 8 PM       methimazole (TAPAZOLE) 5 MG tablet TK 1 T PO QD  5     mometasone (ELOCON) 0.1 % cream APPLY SPARINGLY EXTERNALLY TO THE AFFECTED AREA TWICE DAILY AS NEEDED. DO NOT APPLY TO FACE 45 g 0     Multiple Vitamins-Minerals (MULTIVITAMIN & MINERAL PO) Take by  mouth daily        nitroGLYcerin (NITROSTAT) 0.4 MG sublingual tablet Place 0.4 mg under the tongue as needed 1 tab SL every 5 minutes PRN for angina as directed by physician       nystatin (MYCOSTATIN) 691887 UNIT/GM external powder Apply 1 Dose topically 2 times daily       OLANZapine (ZYPREXA) 20 MG tablet Take 1 tablet by mouth 2 times daily  2     order for DME Equipment being ordered:    BiPAP machine and supplies  DX Z99.89        G47.33        R09.89 1 Device 0     order for DME Equipment being ordered: Bipap and supplies - states it has been 5 years    Cushion for Bipap machine 1 Device 3     order for DME Equipment being ordered: Neb machine and tubing 1 Device 0     order for DME Wheeled walker 1 Device 0     sennosides (SENOKOT) 8.6 MG tablet Take 2 tablets by mouth daily as needed for constipation (Patient taking differently: Take 3 tablets by mouth daily ) 120 tablet 1     simvastatin (ZOCOR) 40 MG tablet Take 1 tablet (40 mg) by mouth At Bedtime 90 tablet 1     traMADol (ULTRAM) 50 MG tablet TAKE 1 TO 2 TABLETS BY MOUTH EVERY 6 HOURS AS NEEDED FOR PAIN. MAX OF 6 TABLETS DAILY 60 tablet 5     triamcinolone (KENALOG) 0.1 % external ointment APPLY TOPICALLY TWICE DAILY TO BACK OF NECK AND HAIRLINE 80 g 1     UNABLE TO FIND Home nebulizer       Vitamin D, Cholecalciferol, 1000 units CAPS Take 1 capsule by mouth daily       warfarin (COUMADIN) 5 MG tablet Take 5mg daily or as directed by [facility] 90 tablet 3     zolpidem (AMBIEN) 10 MG tablet Take 10 mg by mouth At Bedtime  2     Allergies   Allergen Reactions     Depakote      Increased lfts and ammonia     Zyprexa Other (See Comments)     Increased lft and increased ammonia.     Adhesive Tape Rash     Levaquin      Recent Labs   Lab Test 02/12/19  1041 08/13/18  1340 04/24/18  1129 12/26/17  1508 08/25/17  1420   A1C 5.4  --   --  4.9 5.0   LDL 84  --  74 117* 112*   HDL 58  --  52 65 54   TRIG 93  --  130 153* 128   ALT 21 21 21 26 27   CR  "0.69 0.77 0.53 0.56 0.53   GFRESTIMATED >90 77 >90 >90 >90   GFRESTBLACK >90 >90 >90 >90 >90   POTASSIUM 3.9 4.5 4.3 4.5 4.1   TSH 2.92  --  1.82  --  2.20      BP Readings from Last 3 Encounters:   04/29/19 108/60   03/20/19 118/74   08/14/18 122/68    Wt Readings from Last 3 Encounters:   04/29/19 127 kg (280 lb)   03/20/19 127 kg (280 lb)   04/17/18 135.6 kg (299 lb)                  Labs reviewed in EPIC    ROS:  Constitutional, HEENT, cardiovascular, pulmonary, gi and gu systems are negative, except as otherwise noted.    OBJECTIVE:     /60 (BP Location: Left arm, Patient Position: Chair, Cuff Size: Adult Large)   Pulse 88   Resp 18   Ht 1.803 m (5' 11\")   Wt 127 kg (280 lb)   SpO2 94%   BMI 39.05 kg/m    Body mass index is 39.05 kg/m .       GENERAL: healthy, alert and no distress  NECK: no adenopathy, no asymmetry, masses, or scars and thyroid normal to palpation  RESP: lungs clear to auscultation - no rales, rhonchi or wheezes  CV: regular rate and rhythm, normal S1 S2, no S3 or S4, no murmur, click or rub, no peripheral edema and peripheral pulses strong  SKIN: no suspicious lesions or rashes      For Bipap and Neb  Face to face visit occurred 4/29/19  Length of need - lifetime  Dispense: 1 Device; Refill: 0        ASSESSMENT/PLAN:     1. BiPAP (biphasic positive airway pressure) dependence  - order for DME; Equipment being ordered:  BIPAP and supply renewal  Face to face visit occurred 4/29/19  Length of need - lifetime  Dispense: 1 Device; Refill: 0    2. RADHA (obstructive sleep apnea)  - order for DME; Equipment being ordered:  BIPAP and supply renewal  Face to face visit occurred 4/29/19  Length of need - lifetime  Dispense: 1 Device; Refill: 0    3. Reactive airway disease that is not asthma  - order for DME; Equipment being ordered: Neb machine and tubing  Face to face visit 4/29/19  Length of need - lifetime  Has been using nebs since 12/23/13  Dispense: 1 Device; Refill: 0    4. " Wheezing  - order for DME; Equipment being ordered: Neb machine and tubing  Face to face visit 4/29/19  Length of need - lifetime  Has been using nebs since 12/23/13  Dispense: 1 Device; Refill: 0    5. History of deep venous thrombosis  - warfarin (COUMADIN) 3 MG tablet; Take 1 tablet (3 mg) by mouth daily  Dispense: 30 tablet; Refill: 1          Ayah Rojas NP  [facility] [place] [facility]

## 2019-04-26 ENCOUNTER — ANTICOAGULATION THERAPY VISIT (OUTPATIENT)
Dept: ANTICOAGULATION | Facility: OTHER | Age: 59
End: 2019-04-26
Attending: NURSE PRACTITIONER
Payer: MEDICARE

## 2019-04-26 DIAGNOSIS — I26.99 PULMONARY EMBOLISM AND INFARCTION (H): ICD-10-CM

## 2019-04-26 DIAGNOSIS — Z86.718 HISTORY OF DEEP VENOUS THROMBOSIS: ICD-10-CM

## 2019-04-26 DIAGNOSIS — Z79.01 LONG TERM CURRENT USE OF ANTICOAGULANT THERAPY: ICD-10-CM

## 2019-04-26 DIAGNOSIS — Z86.711 HISTORY OF PULMONARY EMBOLISM: ICD-10-CM

## 2019-04-26 LAB — INR BLD: 1.2 (ref 0.86–1.14)

## 2019-04-26 PROCEDURE — 36416 COLLJ CAPILLARY BLOOD SPEC: CPT | Mod: ZL | Performed by: NURSE PRACTITIONER

## 2019-04-26 PROCEDURE — 85610 PROTHROMBIN TIME: CPT | Mod: QW,ZL | Performed by: NURSE PRACTITIONER

## 2019-04-26 NOTE — PROGRESS NOTES
ANTICOAGULATION FOLLOW-UP CLINIC VISIT    Patient Name:  Frances Corea  Date:  2019  Contact Type:  Telephone/ message left on mother, familia, voicemail    SUBJECTIVE:     Patient Findings     Comments:   INR done by lab. Call placed to patient mother and message left on voicemail re: INR result, warfarin dosing and INR recheck date.  Patient mother to call warfarin clinic if patient has any bleeding/bruising, changes in diet/meds/activity or questions. I requested she check the warfarin pills and make sure they are the peach colored 5mg size pills and to let me know if she has not been giving the correct dose.            OBJECTIVE    INR Point of Care   Date Value Ref Range Status   2019 1.2 (H) 0.86 - 1.14 Final     Comment:     This test is intended for monitoring Coumadin therapy.  Results are not   accurate in patients with prolonged INR due to factor deficiency.         ASSESSMENT / PLAN  INR assessment SUB    Recheck INR In: 5 DAYS    INR Location Clinic      Anticoagulation Summary  As of 2019    INR goal:   2.0-3.0   TTR:   35.7 % (1.3 y)   INR used for dosin.2! (2019)   Warfarin maintenance plan:   5 mg (5 mg x 1) every Mon, Fri; 10 mg (5 mg x 2) all other days   Full warfarin instructions:   : 15 mg; : 10 mg; Otherwise 5 mg every Mon, Fri; 10 mg all other days   Weekly warfarin total:   60 mg   Plan last modified:   Stefany Huynh RN (2019)   Next INR check:   2019   Target end date:   Indefinite    Indications    History of deep venous thrombosis [Z86.718]  History of pulmonary embolism [Z86.711]  Long-term (current) use of anticoagulants [Z79.01] (Resolved) [Z79.01]             Anticoagulation Episode Summary     INR check location:       Preferred lab:       Send INR reminders to:   HC ANTICOAG POOL    Comments:   [facility] W; phone        Anticoagulation Care Providers     Provider Role Specialty Phone number    Ayah Rojas  NP [facility] [phone]            See the Encounter Report to view Anticoagulation Flowsheet and Dosing Calendar (Go to Encounters tab in chart review, and find the Anticoagulation Therapy Visit)        Stefany Huynh RN

## 2019-04-26 NOTE — Clinical Note
Frances Corea had INR of 1.2 today. Not sure why. I did leave voicemail on the mother's phone and requested she check the warfarin pills to make sure she is giving the 5mg size pills and not the 3 mg size pills.  Will have INR rechecked in 5 days and increased warfarin dose.Stefany Huynh RNAnticoagulation clinic

## 2019-04-29 ENCOUNTER — PATIENT OUTREACH (OUTPATIENT)
Dept: CARE COORDINATION | Facility: OTHER | Age: 59
End: 2019-04-29

## 2019-04-29 ENCOUNTER — OFFICE VISIT (OUTPATIENT)
Dept: FAMILY MEDICINE | Facility: OTHER | Age: 59
End: 2019-04-29
Attending: NURSE PRACTITIONER
Payer: COMMERCIAL

## 2019-04-29 VITALS
DIASTOLIC BLOOD PRESSURE: 60 MMHG | HEART RATE: 88 BPM | SYSTOLIC BLOOD PRESSURE: 108 MMHG | BODY MASS INDEX: 39.2 KG/M2 | HEIGHT: 71 IN | WEIGHT: 280 LBS | RESPIRATION RATE: 18 BRPM | OXYGEN SATURATION: 94 %

## 2019-04-29 DIAGNOSIS — Z99.89 BIPAP (BIPHASIC POSITIVE AIRWAY PRESSURE) DEPENDENCE: Primary | ICD-10-CM

## 2019-04-29 DIAGNOSIS — Z86.718 HISTORY OF DEEP VENOUS THROMBOSIS: ICD-10-CM

## 2019-04-29 DIAGNOSIS — G47.33 OSA (OBSTRUCTIVE SLEEP APNEA): ICD-10-CM

## 2019-04-29 DIAGNOSIS — R06.2 WHEEZING: ICD-10-CM

## 2019-04-29 DIAGNOSIS — J98.9 REACTIVE AIRWAY DISEASE THAT IS NOT ASTHMA: ICD-10-CM

## 2019-04-29 PROBLEM — Z99.81 ON HOME OXYGEN THERAPY: Status: RESOLVED | Noted: 2017-12-26 | Resolved: 2019-04-29

## 2019-04-29 PROCEDURE — 99214 OFFICE O/P EST MOD 30 MIN: CPT | Performed by: NURSE PRACTITIONER

## 2019-04-29 PROCEDURE — G0463 HOSPITAL OUTPT CLINIC VISIT: HCPCS

## 2019-04-29 RX ORDER — WARFARIN SODIUM 3 MG/1
3 TABLET ORAL DAILY
Qty: 30 TABLET | Refills: 1 | Status: SHIPPED | OUTPATIENT
Start: 2019-04-29 | End: 2019-04-30

## 2019-04-29 ASSESSMENT — MIFFLIN-ST. JEOR: SCORE: 1936.2

## 2019-04-29 ASSESSMENT — PAIN SCALES - GENERAL: PAINLEVEL: NO PAIN (0)

## 2019-04-29 NOTE — NURSING NOTE
"Chief Complaint   Patient presents with     Clinic Care Coordination - Face To Face       Initial /60 (BP Location: Left arm, Patient Position: Chair, Cuff Size: Adult Large)   Pulse 88   Resp 18   Ht 1.803 m (5' 11\")   Wt 127 kg (280 lb)   SpO2 94%   BMI 39.05 kg/m   Estimated body mass index is 39.05 kg/m  as calculated from the following:    Height as of this encounter: 1.803 m (5' 11\").    Weight as of this encounter: 127 kg (280 lb).  Medication Reconciliation: complete    Pamela M. Lechevalier, KIM\    "

## 2019-04-29 NOTE — PATIENT INSTRUCTIONS
ASSESSMENT/PLAN:     1. BiPAP (biphasic positive airway pressure) dependence  - order for DME; Equipment being ordered:  BIPAP and supply renewal  Face to face visit occurred 4/29/19  Length of need - lifetime  Dispense: 1 Device; Refill: 0    2. RADHA (obstructive sleep apnea)  - order for DME; Equipment being ordered:  BIPAP and supply renewal  Face to face visit occurred 4/29/19  Length of need - lifetime  Dispense: 1 Device; Refill: 0    3. Reactive airway disease that is not asthma  - order for DME; Equipment being ordered: Neb machine and tubing  Face to face visit 4/29/19  Length of need - lifetime  Has been using nebs since 12/23/13  Dispense: 1 Device; Refill: 0    4. Wheezing  - order for DME; Equipment being ordered: Neb machine and tubing  Face to face visit 4/29/19  Length of need - lifetime  Has been using nebs since 12/23/13  Dispense: 1 Device; Refill: 0    5. History of deep venous thrombosis  - warfarin (COUMADIN) 3 MG tablet; Take 1 tablet (3 mg) by mouth daily  Dispense: 30 tablet; Refill: 1          Ayah Rojas NP  [facility]

## 2019-05-01 ENCOUNTER — ANTICOAGULATION THERAPY VISIT (OUTPATIENT)
Dept: ANTICOAGULATION | Facility: OTHER | Age: 59
End: 2019-05-01
Attending: NURSE PRACTITIONER
Payer: MEDICARE

## 2019-05-01 DIAGNOSIS — Z86.711 HISTORY OF PULMONARY EMBOLISM: ICD-10-CM

## 2019-05-01 DIAGNOSIS — Z79.01 LONG TERM CURRENT USE OF ANTICOAGULANT THERAPY: ICD-10-CM

## 2019-05-01 DIAGNOSIS — I26.99 PULMONARY EMBOLISM AND INFARCTION (H): ICD-10-CM

## 2019-05-01 DIAGNOSIS — Z86.718 HISTORY OF DEEP VENOUS THROMBOSIS: ICD-10-CM

## 2019-05-01 LAB — INR BLD: 1.7 (ref 0.86–1.14)

## 2019-05-01 PROCEDURE — 85610 PROTHROMBIN TIME: CPT | Mod: QW,ZL | Performed by: NURSE PRACTITIONER

## 2019-05-01 PROCEDURE — 36416 COLLJ CAPILLARY BLOOD SPEC: CPT | Mod: ZL | Performed by: NURSE PRACTITIONER

## 2019-05-01 NOTE — PROGRESS NOTES
ANTICOAGULATION FOLLOW-UP CLINIC VISIT    Patient Name:  Frances Corea  Date:  2019  Contact Type:  Telephone/ message left on mother, Frances, voicemail    SUBJECTIVE:     Patient Findings     Comments:   INR done by lab. Call placed to patient mother and message left on voicemail re: INR result, warfarin dosing and INR recheck date. Patient mother to notify warfarin clinic if patient has any bleeding/bruising, changes in diet/meds/activity or questions.            OBJECTIVE    INR Point of Care   Date Value Ref Range Status   2019 1.7 (H) 0.86 - 1.14 Final     Comment:     This test is intended for monitoring Coumadin therapy.  Results are not   accurate in patients with prolonged INR due to factor deficiency.         ASSESSMENT / PLAN  INR assessment SUB    Recheck INR In: 10 DAYS    INR Location Clinic      Anticoagulation Summary  As of 2019    INR goal:   2.0-3.0   TTR:   35.3 % (1.3 y)   INR used for dosin.7! (2019)   Warfarin maintenance plan:   5 mg (5 mg x 1) every Mon; 10 mg (5 mg x 2) all other days   Full warfarin instructions:   : 15 mg; Otherwise 5 mg every Mon; 10 mg all other days   Weekly warfarin total:   65 mg   Plan last modified:   Stefany Huynh RN (2019)   Next INR check:   5/10/2019   Target end date:   Indefinite    Indications    History of deep venous thrombosis [Z86.718]  History of pulmonary embolism [Z86.711]  Long-term (current) use of anticoagulants [Z79.01] (Resolved) [Z79.01]             Anticoagulation Episode Summary     INR check location:       Preferred lab:       Send INR reminders to:    ANTICOAG POOL    Comments:   [facility] W; phone        Anticoagulation Care Providers     Provider Role Specialty Phone number    Ayah Rojas NP [facility] [phone]            See the Encounter Report to view Anticoagulation Flowsheet and Dosing Calendar (Go to Encounters tab in chart review, and find the  Anticoagulation Therapy Visit)        Stefany Huynh RN

## 2019-05-10 ENCOUNTER — ANTICOAGULATION THERAPY VISIT (OUTPATIENT)
Dept: ANTICOAGULATION | Facility: OTHER | Age: 59
End: 2019-05-10
Attending: NURSE PRACTITIONER
Payer: MEDICARE

## 2019-05-10 DIAGNOSIS — Z86.718 HISTORY OF DEEP VENOUS THROMBOSIS: ICD-10-CM

## 2019-05-10 DIAGNOSIS — Z79.01 LONG TERM CURRENT USE OF ANTICOAGULANT THERAPY: ICD-10-CM

## 2019-05-10 DIAGNOSIS — Z86.711 HISTORY OF PULMONARY EMBOLISM: ICD-10-CM

## 2019-05-10 DIAGNOSIS — I26.99 PULMONARY EMBOLISM AND INFARCTION (H): ICD-10-CM

## 2019-05-10 LAB — INR BLD: 1.5 (ref 0.86–1.14)

## 2019-05-10 PROCEDURE — 36416 COLLJ CAPILLARY BLOOD SPEC: CPT | Mod: ZL | Performed by: NURSE PRACTITIONER

## 2019-05-10 PROCEDURE — 85610 PROTHROMBIN TIME: CPT | Mod: QW,ZL | Performed by: NURSE PRACTITIONER

## 2019-05-10 NOTE — PROGRESS NOTES
ANTICOAGULATION FOLLOW-UP CLINIC VISIT    Patient Name:  Frances Corea  Date:  5/10/2019  Contact Type:  Telephone/ message left on patient mother's voicemail    SUBJECTIVE:  Patient Findings     Comments:   INR done by lab. Call placed to patient mother, Frances and message left on home phone voicemail re: INR result, warfarin dosing and INR recheck date. Frances to notify warfarin clinic if Frances has any unusual bleeding/bruising, changes in diet/meds/activity or questions.         Clinical Outcomes     Negatives:   Major bleeding event, Thromboembolic event, Anticoagulation-related hospital admission, Anticoagulation-related ED visit, Anticoagulation-related fatality    Comments:   INR done by lab. Call placed to patient mother, Frances and message left on home phone voicemail re: INR result, warfarin dosing and INR recheck date. Frances to notify warfarin clinic if Frances has any unusual bleeding/bruising, changes in diet/meds/activity or questions.            OBJECTIVE    INR Point of Care   Date Value Ref Range Status   05/10/2019 1.5 (H) 0.86 - 1.14 Final     Comment:     This test is intended for monitoring Coumadin therapy.  Results are not   accurate in patients with prolonged INR due to factor deficiency.         ASSESSMENT / PLAN  INR assessment SUB    Recheck INR In: 1 WEEK    INR Location Clinic      Anticoagulation Summary  As of 5/10/2019    INR goal:   2.0-3.0   TTR:   34.7 % (1.3 y)   INR used for dosin.5! (5/10/2019)   Warfarin maintenance plan:   10 mg (5 mg x 2) every day   Full warfarin instructions:   5/10: 12.5 mg; Otherwise 10 mg every day   Weekly warfarin total:   70 mg   Plan last modified:   Stefany Huynh RN (5/10/2019)   Next INR check:   2019   Target end date:   Indefinite    Indications    History of deep venous thrombosis [Z86.718]  History of pulmonary embolism [Z86.711]  Long-term (current) use of anticoagulants [Z79.01] (Resolved) [Z79.01]             Anticoagulation  Episode Summary     INR check location:       Preferred lab:       Send INR reminders to:   HC ANTICOAG POOL    Comments:   [facility] W; phone        Anticoagulation Care Providers     Provider Role Specialty Phone number    Ayah Rojas NP [facility] [phone]            See the Encounter Report to view Anticoagulation Flowsheet and Dosing Calendar (Go to Encounters tab in chart review, and find the Anticoagulation Therapy Visit)        Stefany Huynh RN

## 2019-05-17 ENCOUNTER — ANTICOAGULATION THERAPY VISIT (OUTPATIENT)
Dept: ANTICOAGULATION | Facility: OTHER | Age: 59
End: 2019-05-17
Attending: NURSE PRACTITIONER
Payer: MEDICARE

## 2019-05-17 DIAGNOSIS — Z86.711 HISTORY OF PULMONARY EMBOLISM: ICD-10-CM

## 2019-05-17 DIAGNOSIS — Z79.01 LONG TERM CURRENT USE OF ANTICOAGULANT THERAPY: ICD-10-CM

## 2019-05-17 DIAGNOSIS — Z86.718 HISTORY OF DEEP VENOUS THROMBOSIS: ICD-10-CM

## 2019-05-17 DIAGNOSIS — I26.99 PULMONARY EMBOLISM AND INFARCTION (H): ICD-10-CM

## 2019-05-17 LAB — INR BLD: 2.5 (ref 0.86–1.14)

## 2019-05-17 PROCEDURE — 85610 PROTHROMBIN TIME: CPT | Mod: QW,ZL | Performed by: NURSE PRACTITIONER

## 2019-05-17 PROCEDURE — 36416 COLLJ CAPILLARY BLOOD SPEC: CPT | Mod: ZL | Performed by: NURSE PRACTITIONER

## 2019-05-17 NOTE — PROGRESS NOTES
ANTICOAGULATION FOLLOW-UP CLINIC VISIT    Patient Name:  Frances Corea  Date:  2019  Contact Type:  Telephone/ spoke to her mother, familia    SUBJECTIVE:  Patient Findings     Comments:   INR done by lab. Call placed to patient mother home and spoke to her mother re: INR result, warfarin dosing and INR recheck date. Her mother verbalized understanding and has no questions. She states that Frances is eating a salad every day.         Clinical Outcomes     Negatives:   Major bleeding event, Thromboembolic event, Anticoagulation-related hospital admission, Anticoagulation-related ED visit, Anticoagulation-related fatality    Comments:   INR done by lab. Call placed to patient mother home and spoke to her mother re: INR result, warfarin dosing and INR recheck date. Her mother verbalized understanding and has no questions. She states that Frances is eating a salad every day.            OBJECTIVE    INR Point of Care   Date Value Ref Range Status   2019 2.5 (H) 0.86 - 1.14 Final     Comment:     This test is intended for monitoring Coumadin therapy.  Results are not   accurate in patients with prolonged INR due to factor deficiency.         ASSESSMENT / PLAN  INR assessment THER    Recheck INR In: 2 WEEKS    INR Location Clinic      Anticoagulation Summary  As of 2019    INR goal:   2.0-3.0   TTR:   34.9 % (1.4 y)   INR used for dosin.5 (2019)   Warfarin maintenance plan:   10 mg (5 mg x 2) every day   Full warfarin instructions:   10 mg every day   Weekly warfarin total:   70 mg   No change documented:   Stefany Huynh RN   Plan last modified:   Stefany Huynh RN (5/10/2019)   Next INR check:   2019   Target end date:   Indefinite    Indications    History of deep venous thrombosis [Z86.718]  History of pulmonary embolism [Z86.711]  Long-term (current) use of anticoagulants [Z79.01] (Resolved) [Z79.01]             Anticoagulation Episode Summary     INR check location:       Preferred  lab:       Send INR reminders to:   HC ANTICOAG POOL    Comments:   [facility] W; phone        Anticoagulation Care Providers     Provider Role Specialty Phone number    Ayah Rojas NP [facility] [phone]            See the Encounter Report to view Anticoagulation Flowsheet and Dosing Calendar (Go to Encounters tab in chart review, and find the Anticoagulation Therapy Visit)        Stefany Huynh RN

## 2019-05-31 ENCOUNTER — ANTICOAGULATION THERAPY VISIT (OUTPATIENT)
Dept: ANTICOAGULATION | Facility: OTHER | Age: 59
End: 2019-05-31
Attending: NURSE PRACTITIONER
Payer: MEDICARE

## 2019-05-31 DIAGNOSIS — Z79.01 LONG TERM CURRENT USE OF ANTICOAGULANT THERAPY: ICD-10-CM

## 2019-05-31 DIAGNOSIS — I26.99 PULMONARY EMBOLISM AND INFARCTION (H): ICD-10-CM

## 2019-05-31 DIAGNOSIS — Z86.718 HISTORY OF DEEP VENOUS THROMBOSIS: ICD-10-CM

## 2019-05-31 DIAGNOSIS — Z86.711 HISTORY OF PULMONARY EMBOLISM: ICD-10-CM

## 2019-05-31 LAB — INR BLD: 2.8 (ref 0.86–1.14)

## 2019-05-31 PROCEDURE — 36416 COLLJ CAPILLARY BLOOD SPEC: CPT | Mod: ZL | Performed by: NURSE PRACTITIONER

## 2019-05-31 PROCEDURE — 85610 PROTHROMBIN TIME: CPT | Mod: QW,ZL | Performed by: NURSE PRACTITIONER

## 2019-05-31 NOTE — PROGRESS NOTES
ANTICOAGULATION FOLLOW-UP CLINIC VISIT    Patient Name:  Frances Corea  Date:  2019  Contact Type:  Telephone/ message left on her mom's voicemail    SUBJECTIVE:  Patient Findings     Comments:   INR done by lab. Call placed to patient mother and message left re: INR result, warfarin dosing and INR recheck date. She is to call warfarin clinic if she has any bleeding/bruising. Changes in diet/meds/activity or questions.         Clinical Outcomes     Negatives:   Major bleeding event, Thromboembolic event, Anticoagulation-related hospital admission, Anticoagulation-related ED visit, Anticoagulation-related fatality    Comments:   INR done by lab. Call placed to patient mother and message left re: INR result, warfarin dosing and INR recheck date. She is to call warfarin clinic if she has any bleeding/bruising. Changes in diet/meds/activity or questions.            OBJECTIVE    INR Point of Care   Date Value Ref Range Status   2019 2.8 (H) 0.86 - 1.14 Final     Comment:     This test is intended for monitoring Coumadin therapy.  Results are not   accurate in patients with prolonged INR due to factor deficiency.         ASSESSMENT / PLAN  INR assessment THER    Recheck INR In: 4 WEEKS    INR Location Clinic      Anticoagulation Summary  As of 2019    INR goal:   2.0-3.0   TTR:   36.7 % (1.4 y)   INR used for dosin.8 (2019)   Warfarin maintenance plan:   10 mg (5 mg x 2) every day   Full warfarin instructions:   10 mg every day   Weekly warfarin total:   70 mg   No change documented:   Stefany Huynh RN   Plan last modified:   Stefany Huynh RN (5/10/2019)   Next INR check:   2019   Target end date:   Indefinite    Indications    History of deep venous thrombosis [Z86.718]  History of pulmonary embolism [Z86.711]  Long-term (current) use of anticoagulants [Z79.01] (Resolved) [Z79.01]             Anticoagulation Episode Summary     INR check location:       Preferred lab:       Send INR  reminders to:   HC ANTICOAG POOL    Comments:   [facility] W; phone        Anticoagulation Care Providers     Provider Role Specialty Phone number    Ayah Rojas NP [facility] [phone]            See the Encounter Report to view Anticoagulation Flowsheet and Dosing Calendar (Go to Encounters tab in chart review, and find the Anticoagulation Therapy Visit)        Stefany Huynh RN

## 2019-06-18 DIAGNOSIS — I10 BENIGN ESSENTIAL HYPERTENSION: ICD-10-CM

## 2019-06-18 RX ORDER — LISINOPRIL 10 MG/1
TABLET ORAL
Qty: 30 TABLET | Refills: 0 | Status: SHIPPED | OUTPATIENT
Start: 2019-06-18 | End: 2019-06-18

## 2019-06-18 NOTE — TELEPHONE ENCOUNTER
Will need provider to sign initial order   Co signed by Ayah Rojas NP 4/9/2019   Can you please authorize this medication and we can fill future requests.

## 2019-06-18 NOTE — TELEPHONE ENCOUNTER
lisinopril (PRINIVIL/ZESTRIL) 10 MG tablet  Last Written Prescription Date:  historical medication   Last Fill Quantity: 0,   # refills: 0  Last Office Visit: 4/29/19  Future Office visit:

## 2019-06-21 DIAGNOSIS — Z86.718 HISTORY OF DEEP VENOUS THROMBOSIS: ICD-10-CM

## 2019-06-21 DIAGNOSIS — Z86.711 HISTORY OF PULMONARY EMBOLISM: ICD-10-CM

## 2019-06-21 DIAGNOSIS — Z79.01 LONG TERM CURRENT USE OF ANTICOAGULANT THERAPY: ICD-10-CM

## 2019-06-21 RX ORDER — WARFARIN SODIUM 5 MG/1
TABLET ORAL
Qty: 180 TABLET | Refills: 0 | Status: SHIPPED | OUTPATIENT
Start: 2019-06-21 | End: 2019-09-12

## 2019-06-27 DIAGNOSIS — Z79.01 LONG TERM CURRENT USE OF ANTICOAGULANT THERAPY: ICD-10-CM

## 2019-06-27 DIAGNOSIS — I26.99 PULMONARY EMBOLISM AND INFARCTION (H): ICD-10-CM

## 2019-06-27 LAB — INR BLD: 4 (ref 0.86–1.14)

## 2019-06-27 PROCEDURE — 85610 PROTHROMBIN TIME: CPT | Mod: QW,ZL | Performed by: NURSE PRACTITIONER

## 2019-06-27 PROCEDURE — 36416 COLLJ CAPILLARY BLOOD SPEC: CPT | Mod: ZL | Performed by: NURSE PRACTITIONER

## 2019-07-05 ENCOUNTER — ANTICOAGULATION THERAPY VISIT (OUTPATIENT)
Dept: ANTICOAGULATION | Facility: OTHER | Age: 59
End: 2019-07-05

## 2019-07-05 DIAGNOSIS — Z86.711 HISTORY OF PULMONARY EMBOLISM: ICD-10-CM

## 2019-07-05 DIAGNOSIS — Z86.718 HISTORY OF DEEP VENOUS THROMBOSIS: ICD-10-CM

## 2019-07-05 NOTE — PROGRESS NOTES
ANTICOAGULATION FOLLOW-UP CLINIC VISIT    Patient Name:  Frances Corea  Date:  7/5/2019  Contact Type:  Telephone/ received a call from Frances to reschedule, attempted X 3 to call back and nowhere to leave message    SUBJECTIVE:  Patient Findings     Comments:   Received a call from Frances patients mother reporting they couldn't come in today and would be in on Monday.  Rescheduled patient to Monday 7/8.  Frances left message that patient had the flu.  Call placed to patient's home X 3 and nowhere to leave a message.          Clinical Outcomes     Comments:   Received a call from Frances patients mother reporting they couldn't come in today and would be in on Monday.  Rescheduled patient to Monday 7/8.  Frances left message that patient had the flu.  Call placed to patient's home X 3 and nowhere to leave a message.             OBJECTIVE    INR Point of Care   Date Value Ref Range Status   06/27/2019 4.0 (H) 0.86 - 1.14 Final     Comment:     This test is intended for monitoring Coumadin therapy.  Results are not   accurate in patients with prolonged INR due to factor deficiency.         ASSESSMENT / PLAN  No question data found.  Anticoagulation Summary  As of 7/5/2019    INR goal:   2.0-3.0   TTR:   35.7 % (1.5 y)   INR used for dosing:   No new INR was available at the time of this encounter.   Warfarin maintenance plan:   10 mg (5 mg x 2) every day   Full warfarin instructions:   10 mg every day   Weekly warfarin total:   70 mg   No change documented:   Ivanna Martinez RN   Plan last modified:   Stefany Huynh RN (5/10/2019)   Next INR check:   7/8/2019   Target end date:   Indefinite    Indications    History of deep venous thrombosis [Z86.718]  History of pulmonary embolism [Z86.711]  Long-term (current) use of anticoagulants [Z79.01] (Resolved) [Z79.01]             Anticoagulation Episode Summary     INR check location:       Preferred lab:       Send INR reminders to:   HC ANTICOAG POOL    Comments:   [facility] visits W; phone        Anticoagulation Care Providers     Provider Role Specialty Phone number    Ayah Rojas NP [facility] [phone]            See the Encounter Report to view Anticoagulation Flowsheet and Dosing Calendar (Go to Encounters tab in chart review, and find the Anticoagulation Therapy Visit)      Ivanna Martinez RN

## 2019-07-07 DIAGNOSIS — M25.562 LEFT KNEE PAIN, UNSPECIFIED CHRONICITY: ICD-10-CM

## 2019-07-08 ENCOUNTER — ANTICOAGULATION THERAPY VISIT (OUTPATIENT)
Dept: ANTICOAGULATION | Facility: OTHER | Age: 59
End: 2019-07-08
Attending: NURSE PRACTITIONER
Payer: MEDICARE

## 2019-07-08 DIAGNOSIS — I26.99 PULMONARY EMBOLISM AND INFARCTION (H): ICD-10-CM

## 2019-07-08 DIAGNOSIS — Z86.711 HISTORY OF PULMONARY EMBOLISM: ICD-10-CM

## 2019-07-08 DIAGNOSIS — Z86.718 HISTORY OF DEEP VENOUS THROMBOSIS: ICD-10-CM

## 2019-07-08 DIAGNOSIS — Z79.01 LONG TERM CURRENT USE OF ANTICOAGULANT THERAPY: ICD-10-CM

## 2019-07-08 LAB — INR BLD: 2.3 (ref 0.86–1.14)

## 2019-07-08 PROCEDURE — 85610 PROTHROMBIN TIME: CPT | Mod: QW,ZL | Performed by: NURSE PRACTITIONER

## 2019-07-08 PROCEDURE — 36416 COLLJ CAPILLARY BLOOD SPEC: CPT | Mod: ZL | Performed by: NURSE PRACTITIONER

## 2019-07-08 RX ORDER — TRAMADOL HYDROCHLORIDE 50 MG/1
TABLET ORAL
Qty: 60 TABLET | Refills: 0 | Status: SHIPPED | OUTPATIENT
Start: 2019-07-08 | End: 2019-07-11

## 2019-07-08 NOTE — TELEPHONE ENCOUNTER
Not on protocol, please advise.    traMADol (ULTRAM) 50 MG tablet     Last Written Prescription Date:  3/20/19  Last Fill Quantity: 60,   # refills: 5  Last Office Visit: 4/29/19  Future Office visit:       Routing refill request to provider for review/approval because:  Drug not on the [facility], [facility] or [facility] refill protocol or controlled substance

## 2019-07-08 NOTE — PROGRESS NOTES
ANTICOAGULATION FOLLOW-UP CLINIC VISIT    Patient Name:  Frances Corea  Date:  2019  Contact Type:  Telephone/ message left on mother's home phone voicemail    SUBJECTIVE:  Patient Findings     Comments:   INR done by lab. Call placed to patient mother's home and message left re: INR result, warfarin dosing and INR recheck date. She is to call warfarin clinic if patient has any bleeding/bruising, changes in diet/meds/activity or questions.         Clinical Outcomes     Negatives:   Major bleeding event, Thromboembolic event, Anticoagulation-related hospital admission, Anticoagulation-related ED visit, Anticoagulation-related fatality    Comments:   INR done by lab. Call placed to patient mother's home and message left re: INR result, warfarin dosing and INR recheck date. She is to call warfarin clinic if patient has any bleeding/bruising, changes in diet/meds/activity or questions.            OBJECTIVE    INR Point of Care   Date Value Ref Range Status   2019 2.3 (H) 0.86 - 1.14 Final     Comment:     This test is intended for monitoring Coumadin therapy.  Results are not   accurate in patients with prolonged INR due to factor deficiency.         ASSESSMENT / PLAN  INR assessment THER    Recheck INR In: 2 WEEKS    INR Location Clinic      Anticoagulation Summary  As of 2019    INR goal:   2.0-3.0   TTR:   35.8 % (1.5 y)   INR used for dosin.3 (2019)   Warfarin maintenance plan:   7.5 mg (5 mg x 1.5) every Fri; 10 mg (5 mg x 2) all other days   Full warfarin instructions:   7.5 mg every Fri; 10 mg all other days   Weekly warfarin total:   67.5 mg   Plan last modified:   Stefany Huynh RN (2019)   Next INR check:   2019   Target end date:   Indefinite    Indications    History of deep venous thrombosis [Z86.718]  History of pulmonary embolism [Z86.711]  Long-term (current) use of anticoagulants [Z79.01] (Resolved) [Z79.01]             Anticoagulation Episode Summary     INR check  location:       Preferred lab:       Send INR reminders to:   HC ANTICOAG POOL    Comments:   [facility] - [facility] visits W; phone        Anticoagulation Care Providers     Provider Role Specialty Phone number    FlkhangyAah richards NP [facility] [phone]            See the Encounter Report to view Anticoagulation Flowsheet and Dosing Calendar (Go to Encounters tab in chart review, and find the Anticoagulation Therapy Visit)        Stefany Huynh RN

## 2019-07-11 DIAGNOSIS — M25.562 LEFT KNEE PAIN, UNSPECIFIED CHRONICITY: ICD-10-CM

## 2019-07-11 RX ORDER — TRAMADOL HYDROCHLORIDE 50 MG/1
TABLET ORAL
Qty: 60 TABLET | Refills: 0 | Status: SHIPPED | OUTPATIENT
Start: 2019-07-11 | End: 2019-08-08

## 2019-07-11 NOTE — TELEPHONE ENCOUNTER
traMADol (ULTRAM) 50 MG tablet  Last Written Prescription Date:  7/8/2019  Last Fill Quantity: 60,   # refills: 0  Last Office Visit: 4/29/2019    Future Office visit:       Routing refill request to provider for review/approval because:  Drug not on the [facility], [facility] or [facility] refill protocol or controlled substance

## 2019-07-26 ENCOUNTER — ANTICOAGULATION THERAPY VISIT (OUTPATIENT)
Dept: ANTICOAGULATION | Facility: OTHER | Age: 59
End: 2019-07-26
Attending: NURSE PRACTITIONER
Payer: MEDICARE

## 2019-07-26 DIAGNOSIS — Z86.718 HISTORY OF DEEP VENOUS THROMBOSIS: ICD-10-CM

## 2019-07-26 DIAGNOSIS — Z79.01 LONG TERM CURRENT USE OF ANTICOAGULANT THERAPY: ICD-10-CM

## 2019-07-26 DIAGNOSIS — Z86.711 HISTORY OF PULMONARY EMBOLISM: ICD-10-CM

## 2019-07-26 DIAGNOSIS — I26.99 PULMONARY EMBOLISM AND INFARCTION (H): ICD-10-CM

## 2019-07-26 LAB — INR BLD: 2.8 (ref 0.86–1.14)

## 2019-07-26 PROCEDURE — 85610 PROTHROMBIN TIME: CPT | Mod: QW,ZL | Performed by: NURSE PRACTITIONER

## 2019-07-26 PROCEDURE — 36416 COLLJ CAPILLARY BLOOD SPEC: CPT | Mod: ZL | Performed by: NURSE PRACTITIONER

## 2019-07-26 NOTE — PROGRESS NOTES
ANTICOAGULATION FOLLOW-UP CLINIC VISIT    Patient Name:  Frances Corea  Date:  2019  Contact Type:  Telephone/ message left on mother's home phone voicemail    SUBJECTIVE:  Patient Findings     Comments:   INR done by lab. Call placed to patient mother's home phone and message left re: INR result, warfarin dosing/INR recheck date. Patient mom to call warfarin clinic if patient has any unusual bleeding/bruising, changes in diet/meds/activity or questions. Also encouraged a slight increase in vit K intake.         Clinical Outcomes     Negatives:   Major bleeding event, Thromboembolic event, Anticoagulation-related hospital admission, Anticoagulation-related ED visit, Anticoagulation-related fatality    Comments:   INR done by lab. Call placed to patient mother's home phone and message left re: INR result, warfarin dosing/INR recheck date. Patient mom to call warfarin clinic if patient has any unusual bleeding/bruising, changes in diet/meds/activity or questions. Also encouraged a slight increase in vit K intake.            OBJECTIVE    INR Point of Care   Date Value Ref Range Status   2019 2.8 (H) 0.86 - 1.14 Final     Comment:     This test is intended for monitoring Coumadin therapy.  Results are not   accurate in patients with prolonged INR due to factor deficiency.         ASSESSMENT / PLAN  INR assessment THER    Recheck INR In: 4 WEEKS    INR Location Clinic      Anticoagulation Summary  As of 2019    INR goal:   2.0-3.0   TTR:   37.8 % (1.6 y)   INR used for dosin.8 (2019)   Warfarin maintenance plan:   7.5 mg (5 mg x 1.5) every Fri; 10 mg (5 mg x 2) all other days   Full warfarin instructions:   7.5 mg every Fri; 10 mg all other days   Weekly warfarin total:   67.5 mg   No change documented:   Stefany Huynh, RN   Plan last modified:   Stefayn Huynh RN (2019)   Next INR check:   2019   Target end date:   Indefinite    Indications    History of deep venous thrombosis  [Z86.718]  History of pulmonary embolism [Z86.711]  Long-term (current) use of anticoagulants [Z79.01] (Resolved) [Z79.01]             Anticoagulation Episode Summary     INR check location:       Preferred lab:       Send INR reminders to:    ANTICOAG POOL    Comments:   [facility] - [facility] visits W; phone        Anticoagulation Care Providers     Provider Role Specialty Phone number    Crystal JACK Poon [facility] [phone]            See the Encounter Report to view Anticoagulation Flowsheet and Dosing Calendar (Go to Encounters tab in chart review, and find the Anticoagulation Therapy Visit)        Stefany Huynh RN

## 2019-08-08 DIAGNOSIS — M25.562 LEFT KNEE PAIN, UNSPECIFIED CHRONICITY: ICD-10-CM

## 2019-08-08 RX ORDER — TRAMADOL HYDROCHLORIDE 50 MG/1
TABLET ORAL
Qty: 60 TABLET | Refills: 0 | Status: SHIPPED | OUTPATIENT
Start: 2019-08-08 | End: 2019-09-06

## 2019-08-08 NOTE — TELEPHONE ENCOUNTER
traMADol (ULTRAM) 50 MG tablet      Last Written Prescription Date:  7-11-9  Last Fill Quantity: 60,   # refills: 0  Last Office Visit: 4-29-19  Future Office visit:       Routing refill request to provider for review/approval because:  Drug not on the [facility], [facility] or [facility] refill protocol or controlled substance

## 2019-08-21 ENCOUNTER — ANTICOAGULATION THERAPY VISIT (OUTPATIENT)
Dept: ANTICOAGULATION | Facility: OTHER | Age: 59
End: 2019-08-21
Attending: NURSE PRACTITIONER
Payer: MEDICARE

## 2019-08-21 DIAGNOSIS — Z86.718 HISTORY OF DEEP VENOUS THROMBOSIS: ICD-10-CM

## 2019-08-21 DIAGNOSIS — I26.99 PULMONARY EMBOLISM AND INFARCTION (H): ICD-10-CM

## 2019-08-21 DIAGNOSIS — Z86.711 HISTORY OF PULMONARY EMBOLISM: ICD-10-CM

## 2019-08-21 DIAGNOSIS — Z79.01 LONG TERM CURRENT USE OF ANTICOAGULANT THERAPY: ICD-10-CM

## 2019-08-21 LAB — INR BLD: 4.2 (ref 0.86–1.14)

## 2019-08-21 PROCEDURE — 36416 COLLJ CAPILLARY BLOOD SPEC: CPT | Mod: ZL | Performed by: NURSE PRACTITIONER

## 2019-08-21 PROCEDURE — 85610 PROTHROMBIN TIME: CPT | Mod: QW,ZL | Performed by: NURSE PRACTITIONER

## 2019-08-21 NOTE — PROGRESS NOTES
ANTICOAGULATION FOLLOW-UP CLINIC VISIT    Patient Name:  Frances Corea  Date:  2019  Contact Type:  Telephone/ spoke to her mom, Frances    SUBJECTIVE:  Patient Findings     Comments:   INR done by lab. Call placed to patient and spoke to her mom re: INR result, warfarin dosing/INR recheck date. Her mom states she has no bleeding/bruising nor any new changes in diet/meds/activity        Clinical Outcomes     Negatives:   Major bleeding event, Thromboembolic event, Anticoagulation-related hospital admission, Anticoagulation-related ED visit, Anticoagulation-related fatality    Comments:   INR done by lab. Call placed to patient and spoke to her mom re: INR result, warfarin dosing/INR recheck date. Her mom states she has no bleeding/bruising nor any new changes in diet/meds/activity           OBJECTIVE    INR Point of Care   Date Value Ref Range Status   2019 4.2 (H) 0.86 - 1.14 Final     Comment:     This test is intended for monitoring Coumadin therapy.  Results are not   accurate in patients with prolonged INR due to factor deficiency.         ASSESSMENT / PLAN  INR assessment SUPRA slightly less vit K intake   Recheck INR In: 3 WEEKS    INR Location Clinic      Anticoagulation Summary  As of 2019    INR goal:   2.0-3.0   TTR:   36.8 % (1.6 y)   INR used for dosin.2! (2019)   Warfarin maintenance plan:   7.5 mg (5 mg x 1.5) every Fri; 10 mg (5 mg x 2) all other days   Full warfarin instructions:   : 5 mg; Otherwise 7.5 mg every Fri; 10 mg all other days   Weekly warfarin total:   67.5 mg   Plan last modified:   Stefany Huynh RN (2019)   Next INR check:   2019   Target end date:   Indefinite    Indications    History of deep venous thrombosis [Z86.718]  History of pulmonary embolism [Z86.711]  Long-term (current) use of anticoagulants [Z79.01] (Resolved) [Z79.01]             Anticoagulation Episode Summary     INR check location:       Preferred lab:       Send INR  reminders to:   HC ANTICOAG POOL    Comments:   [facility] - [facility] visits W; phone        Anticoagulation Care Providers     Provider Role Specialty Phone number    Ayah Rojas NP [facility] [phone]            See the Encounter Report to view Anticoagulation Flowsheet and Dosing Calendar (Go to Encounters tab in chart review, and find the Anticoagulation Therapy Visit)        Stefany Huynh RN

## 2019-08-23 DIAGNOSIS — F31.81 BIPOLAR 2 DISORDER (H): Primary | ICD-10-CM

## 2019-08-23 DIAGNOSIS — Z51.81 ENCOUNTER FOR MONITORING STATIN THERAPY: ICD-10-CM

## 2019-08-23 DIAGNOSIS — F25.9 SCHIZOAFFECTIVE DISORDER, CHRONIC CONDITION WITH ACUTE EXACERBATION (H): ICD-10-CM

## 2019-08-23 DIAGNOSIS — I10 BENIGN ESSENTIAL HYPERTENSION: ICD-10-CM

## 2019-08-23 DIAGNOSIS — E66.01 MORBID OBESITY (H): ICD-10-CM

## 2019-08-23 DIAGNOSIS — Z79.899 ENCOUNTER FOR MONITORING STATIN THERAPY: ICD-10-CM

## 2019-08-23 DIAGNOSIS — R53.82 CHRONIC FATIGUE: ICD-10-CM

## 2019-08-23 LAB
EST. AVERAGE GLUCOSE BLD GHB EST-MCNC: 100 MG/DL
HBA1C MFR BLD: 5.1 % (ref 0–5.6)
LITHIUM SERPL-SCNC: 0.93 MMOL/L (ref 0.6–1.2)

## 2019-08-23 PROCEDURE — 40000788 ZZHCL STATISTIC ESTIMATED AVERAGE GLUCOSE: Mod: ZL | Performed by: NURSE PRACTITIONER

## 2019-08-23 PROCEDURE — 36415 COLL VENOUS BLD VENIPUNCTURE: CPT | Mod: ZL | Performed by: NURSE PRACTITIONER

## 2019-08-23 PROCEDURE — 84480 ASSAY TRIIODOTHYRONINE (T3): CPT | Mod: ZL | Performed by: NURSE PRACTITIONER

## 2019-08-23 PROCEDURE — 80178 ASSAY OF LITHIUM: CPT | Mod: ZL | Performed by: NURSE PRACTITIONER

## 2019-08-23 PROCEDURE — 83036 HEMOGLOBIN GLYCOSYLATED A1C: CPT | Mod: ZL | Performed by: NURSE PRACTITIONER

## 2019-08-26 DIAGNOSIS — F31.81 BIPOLAR 2 DISORDER (H): ICD-10-CM

## 2019-08-26 DIAGNOSIS — E66.01 MORBID OBESITY (H): ICD-10-CM

## 2019-08-26 DIAGNOSIS — F25.9 SCHIZOAFFECTIVE DISORDER, CHRONIC CONDITION WITH ACUTE EXACERBATION (H): ICD-10-CM

## 2019-08-26 DIAGNOSIS — Z51.81 ENCOUNTER FOR MONITORING STATIN THERAPY: ICD-10-CM

## 2019-08-26 DIAGNOSIS — I10 BENIGN ESSENTIAL HYPERTENSION: ICD-10-CM

## 2019-08-26 DIAGNOSIS — R53.82 CHRONIC FATIGUE: ICD-10-CM

## 2019-08-26 DIAGNOSIS — Z79.899 ENCOUNTER FOR MONITORING STATIN THERAPY: ICD-10-CM

## 2019-08-26 LAB
ALBUMIN SERPL-MCNC: 4 G/DL (ref 3.4–5)
ALP SERPL-CCNC: 111 U/L (ref 40–150)
ALT SERPL W P-5'-P-CCNC: 34 U/L (ref 0–50)
ANION GAP SERPL CALCULATED.3IONS-SCNC: 5 MMOL/L (ref 3–14)
AST SERPL W P-5'-P-CCNC: 18 U/L (ref 0–45)
BILIRUB DIRECT SERPL-MCNC: 0.2 MG/DL (ref 0–0.2)
BILIRUB SERPL-MCNC: 0.9 MG/DL (ref 0.2–1.3)
BUN SERPL-MCNC: 11 MG/DL (ref 7–30)
CALCIUM SERPL-MCNC: 10.1 MG/DL (ref 8.5–10.1)
CHLORIDE SERPL-SCNC: 110 MMOL/L (ref 94–109)
CHOLEST SERPL-MCNC: 157 MG/DL
CO2 SERPL-SCNC: 26 MMOL/L (ref 20–32)
CREAT SERPL-MCNC: 0.6 MG/DL (ref 0.52–1.04)
GFR SERPL CREATININE-BSD FRML MDRD: >90 ML/MIN/{1.73_M2}
GLUCOSE SERPL-MCNC: 106 MG/DL (ref 70–99)
HDLC SERPL-MCNC: 63 MG/DL
LDLC SERPL CALC-MCNC: 73 MG/DL
NONHDLC SERPL-MCNC: 94 MG/DL
POTASSIUM SERPL-SCNC: 4.3 MMOL/L (ref 3.4–5.3)
PROT SERPL-MCNC: 7.8 G/DL (ref 6.8–8.8)
SODIUM SERPL-SCNC: 141 MMOL/L (ref 133–144)
T3 SERPL-MCNC: 111 NG/DL (ref 60–181)
T4 FREE SERPL-MCNC: 1.07 NG/DL (ref 0.76–1.46)
TRIGL SERPL-MCNC: 105 MG/DL
TSH SERPL DL<=0.005 MIU/L-ACNC: 1.66 MU/L (ref 0.4–4)

## 2019-08-26 PROCEDURE — 84443 ASSAY THYROID STIM HORMONE: CPT | Mod: ZL | Performed by: NURSE PRACTITIONER

## 2019-08-26 PROCEDURE — 80076 HEPATIC FUNCTION PANEL: CPT | Mod: ZL | Performed by: NURSE PRACTITIONER

## 2019-08-26 PROCEDURE — 84439 ASSAY OF FREE THYROXINE: CPT | Mod: ZL | Performed by: NURSE PRACTITIONER

## 2019-08-26 PROCEDURE — 80048 BASIC METABOLIC PNL TOTAL CA: CPT | Mod: ZL | Performed by: NURSE PRACTITIONER

## 2019-08-26 PROCEDURE — 80061 LIPID PANEL: CPT | Mod: ZL | Performed by: NURSE PRACTITIONER

## 2019-08-26 PROCEDURE — 36415 COLL VENOUS BLD VENIPUNCTURE: CPT | Mod: ZL | Performed by: NURSE PRACTITIONER

## 2019-09-05 DIAGNOSIS — M25.562 LEFT KNEE PAIN, UNSPECIFIED CHRONICITY: ICD-10-CM

## 2019-09-05 NOTE — TELEPHONE ENCOUNTER
traMADol (ULTRAM) 50 MG tablet  Last Written Prescription Date:  8/8/19  Last Fill Quantity: 60,   # refills: 0  Last Office Visit: 4/29/19  Future Office visit:

## 2019-09-06 RX ORDER — TRAMADOL HYDROCHLORIDE 50 MG/1
TABLET ORAL
Qty: 60 TABLET | Refills: 0 | Status: SHIPPED | OUTPATIENT
Start: 2019-09-06 | End: 2019-10-07

## 2019-09-10 ENCOUNTER — ANTICOAGULATION THERAPY VISIT (OUTPATIENT)
Dept: ANTICOAGULATION | Facility: OTHER | Age: 59
End: 2019-09-10
Attending: NURSE PRACTITIONER
Payer: MEDICARE

## 2019-09-10 DIAGNOSIS — Z86.718 HISTORY OF DEEP VENOUS THROMBOSIS: ICD-10-CM

## 2019-09-10 DIAGNOSIS — Z86.711 HISTORY OF PULMONARY EMBOLISM: ICD-10-CM

## 2019-09-10 DIAGNOSIS — Z79.01 LONG TERM CURRENT USE OF ANTICOAGULANT THERAPY: ICD-10-CM

## 2019-09-10 DIAGNOSIS — I26.99 PULMONARY EMBOLISM AND INFARCTION (H): ICD-10-CM

## 2019-09-10 LAB — INR BLD: 5.2 (ref 0.86–1.14)

## 2019-09-10 PROCEDURE — 85610 PROTHROMBIN TIME: CPT | Mod: QW,ZL | Performed by: NURSE PRACTITIONER

## 2019-09-10 PROCEDURE — 36416 COLLJ CAPILLARY BLOOD SPEC: CPT | Mod: ZL | Performed by: NURSE PRACTITIONER

## 2019-09-10 NOTE — PROGRESS NOTES
ANTICOAGULATION FOLLOW-UP CLINIC VISIT    Patient Name:  Frances Corea  Date:  9/10/2019  Contact Type:  Telephone/ call placed x 8 times to mother's home, no answer and no answering machine to leave message.    SUBJECTIVE:  Patient Findings     Comments:   INR done by lab and result called to warfarin clinic by Katarina.  INR is elevated. Call placed to patient mother's home phone but no answer and no answering machine avalable to leave a message.         Clinical Outcomes     Comments:   INR done by lab and result called to warfarin clinic by Katarina.  INR is elevated. Call placed to patient mother's home phone but no answer and no answering machine avalable to leave a message.            OBJECTIVE    INR Point of Care   Date Value Ref Range Status   09/10/2019 5.2 (HH) 0.86 - 1.14 Final     Comment:     Critical Value called to and read back by  PAT HUYNH AT 1418 BY [facility]  This test is intended for monitoring Coumadin therapy.  Results are not   accurate in patients with prolonged INR due to factor deficiency.         ASSESSMENT / PLAN  INR assessment SUPRA    Recheck INR In: 1 WEEK    INR Location Clinic      Anticoagulation Summary  As of 9/10/2019    INR goal:   2.0-3.0   TTR:   35.6 % (1.7 y)   INR used for dosin.2! (9/10/2019)   Warfarin maintenance plan:   7.5 mg (5 mg x 1.5) every Mon, Wed, Fri; 10 mg (5 mg x 2) all other days   Full warfarin instructions:   9/10: Hold; : 5 mg; Otherwise 7.5 mg every Mon, Wed, Fri; 10 mg all other days   Weekly warfarin total:   62.5 mg   Plan last modified:   Pat Huynh RN (9/10/2019)   Next INR check:   2019   Target end date:   Indefinite    Indications    History of deep venous thrombosis [Z86.718]  History of pulmonary embolism [Z86.711]  Long-term (current) use of anticoagulants [Z79.01] (Resolved) [Z79.01]             Anticoagulation Episode Summary     INR check location:       Preferred lab:       Send INR reminders to:   HC ANTICOAG POOL     Comments:   [facility] visits W; phone        Anticoagulation Care Providers     Provider Role Specialty Phone number    Crystal ELLEN Poon [facility] [phone]            See the Encounter Report to view Anticoagulation Flowsheet and Dosing Calendar (Go to Encounters tab in chart review, and find the Anticoagulation Therapy Visit)        Stefany Huynh RN

## 2019-09-11 ENCOUNTER — ANTICOAGULATION THERAPY VISIT (OUTPATIENT)
Dept: ANTICOAGULATION | Facility: OTHER | Age: 59
End: 2019-09-11

## 2019-09-11 DIAGNOSIS — Z86.718 HISTORY OF DEEP VENOUS THROMBOSIS: ICD-10-CM

## 2019-09-11 DIAGNOSIS — Z86.711 HISTORY OF PULMONARY EMBOLISM: ICD-10-CM

## 2019-09-11 NOTE — PROGRESS NOTES
ANTICOAGULATION FOLLOW-UP CLINIC VISIT    Patient Name:  Frances Corea  Date:  9/11/2019  Contact Type:  Telephone/ spoke to her mother    SUBJECTIVE:  Patient Findings     Comments:   Call from patient mother stating her phone was not working yesterday. She states she did not give Frances any warfarin last night. We discussed warfarin dosing/INR recheck date and her mom verbalized understanding and has no questions. Patient has no bleeding/bruising and no changes in diet/meds/activity.         Clinical Outcomes     Negatives:   Major bleeding event, Thromboembolic event, Anticoagulation-related hospital admission, Anticoagulation-related ED visit, Anticoagulation-related fatality    Comments:   Call from patient mother stating her phone was not working yesterday. She states she did not give Frances any warfarin last night. We discussed warfarin dosing/INR recheck date and her mom verbalized understanding and has no questions. Patient has no bleeding/bruising and no changes in diet/meds/activity.            OBJECTIVE    INR Point of Care   Date Value Ref Range Status   09/10/2019 5.2 (HH) 0.86 - 1.14 Final     Comment:     Critical Value called to and read back by  PAT HUYNH AT 1418 BY [facility]  This test is intended for monitoring Coumadin therapy.  Results are not   accurate in patients with prolonged INR due to factor deficiency.         ASSESSMENT / PLAN  No question data found.  Anticoagulation Summary  As of 9/11/2019    INR goal:   2.0-3.0   TTR:   35.6 % (1.7 y)   INR used for dosing:   No new INR was available at the time of this encounter.   Warfarin maintenance plan:   7.5 mg (5 mg x 1.5) every Mon, Wed, Fri; 10 mg (5 mg x 2) all other days   Full warfarin instructions:   9/11: 5 mg; Otherwise 7.5 mg every Mon, Wed, Fri; 10 mg all other days   Weekly warfarin total:   62.5 mg   No change documented:   Pat Huynh, RN   Plan last modified:   Pat Huynh RN (9/10/2019)   Next INR check:   9/17/2019    Target end date:   Indefinite    Indications    History of deep venous thrombosis [Z86.718]  History of pulmonary embolism [Z86.711]  Long-term (current) use of anticoagulants [Z79.01] (Resolved) [Z79.01]             Anticoagulation Episode Summary     INR check location:       Preferred lab:       Send INR reminders to:   HC ANTICOAG POOL    Comments:   [facility] visits W; phone        Anticoagulation Care Providers     Provider Role Specialty Phone number    Fljaz ELLEN Poon [facility] [phone]            See the Encounter Report to view Anticoagulation Flowsheet and Dosing Calendar (Go to Encounters tab in chart review, and find the Anticoagulation Therapy Visit)        Stefany Huynh RN

## 2019-09-12 DIAGNOSIS — I10 BENIGN ESSENTIAL HYPERTENSION: ICD-10-CM

## 2019-09-13 RX ORDER — LISINOPRIL 10 MG/1
TABLET ORAL
Qty: 90 TABLET | Refills: 1 | Status: SHIPPED | OUTPATIENT
Start: 2019-09-13 | End: 2020-03-11

## 2019-09-17 ENCOUNTER — ANTICOAGULATION THERAPY VISIT (OUTPATIENT)
Dept: ANTICOAGULATION | Facility: OTHER | Age: 59
End: 2019-09-17
Attending: NURSE PRACTITIONER
Payer: MEDICARE

## 2019-09-17 DIAGNOSIS — Z86.718 HISTORY OF DEEP VENOUS THROMBOSIS: ICD-10-CM

## 2019-09-17 DIAGNOSIS — I26.99 PULMONARY EMBOLISM AND INFARCTION (H): ICD-10-CM

## 2019-09-17 DIAGNOSIS — Z86.711 HISTORY OF PULMONARY EMBOLISM: ICD-10-CM

## 2019-09-17 DIAGNOSIS — Z79.01 LONG TERM CURRENT USE OF ANTICOAGULANT THERAPY: ICD-10-CM

## 2019-09-17 LAB — INR BLD: 2.8 (ref 0.86–1.14)

## 2019-09-17 PROCEDURE — 36416 COLLJ CAPILLARY BLOOD SPEC: CPT | Mod: ZL | Performed by: NURSE PRACTITIONER

## 2019-09-17 PROCEDURE — 85610 PROTHROMBIN TIME: CPT | Mod: QW,ZL | Performed by: NURSE PRACTITIONER

## 2019-09-17 NOTE — PROGRESS NOTES
ANTICOAGULATION FOLLOW-UP CLINIC VISIT    Patient Name:  Frances Corea  Date:  2019  Contact Type:  Telephone/ message left on mother, Frances, jannethil    SUBJECTIVE:  Patient Findings     Comments:   INR done by lab. Call placed to patient mother and message left re: INR result, warfarin dosing/INR recheck date. Patient mom to call warfarin clinic if patient has any bleeding/bruising, new changes in diet/meds/activity or questions.         Clinical Outcomes     Negatives:   Major bleeding event, Thromboembolic event, Anticoagulation-related hospital admission, Anticoagulation-related ED visit, Anticoagulation-related fatality    Comments:   INR done by lab. Call placed to patient mother and message left re: INR result, warfarin dosing/INR recheck date. Patient mom to call warfarin clinic if patient has any bleeding/bruising, new changes in diet/meds/activity or questions.            OBJECTIVE    INR Point of Care   Date Value Ref Range Status   2019 2.8 (H) 0.86 - 1.14 Final     Comment:     This test is intended for monitoring Coumadin therapy.  Results are not   accurate in patients with prolonged INR due to factor deficiency.         ASSESSMENT / PLAN  INR assessment THER    Recheck INR In: 2 WEEKS    INR Location Clinic      Anticoagulation Summary  As of 2019    INR goal:   2.0-3.0   TTR:   35.3 % (1.7 y)   INR used for dosin.8 (2019)   Warfarin maintenance plan:   7.5 mg (5 mg x 1.5) every Mon, Wed, Fri; 10 mg (5 mg x 2) all other days   Full warfarin instructions:   7.5 mg every Mon, Wed, Fri; 10 mg all other days   Weekly warfarin total:   62.5 mg   No change documented:   Stefany Huynh RN   Plan last modified:   Stefany Huynh RN (9/10/2019)   Next INR check:   10/1/2019   Target end date:   Indefinite    Indications    History of deep venous thrombosis [Z86.718]  History of pulmonary embolism [Z86.711]  Long-term (current) use of anticoagulants [Z79.01] (Resolved)  [Z79.01]             Anticoagulation Episode Summary     INR check location:       Preferred lab:       Send INR reminders to:   HC ANTICOAG POOL    Comments:   [facility] visits W; phone        Anticoagulation Care Providers     Provider Role Specialty Phone number    Ayah Rojas CNP [facility] [phone]            See the Encounter Report to view Anticoagulation Flowsheet and Dosing Calendar (Go to Encounters tab in chart review, and find the Anticoagulation Therapy Visit)        Stefany Huynh RN

## 2019-09-23 PROBLEM — F11.90 CHRONIC, CONTINUOUS USE OF OPIOIDS: Chronic | Status: ACTIVE | Noted: 2019-09-23

## 2019-10-03 ENCOUNTER — ANTICOAGULATION THERAPY VISIT (OUTPATIENT)
Dept: ANTICOAGULATION | Facility: OTHER | Age: 59
End: 2019-10-03
Attending: NURSE PRACTITIONER
Payer: MEDICARE

## 2019-10-03 DIAGNOSIS — Z79.01 LONG TERM CURRENT USE OF ANTICOAGULANT THERAPY: ICD-10-CM

## 2019-10-03 DIAGNOSIS — I26.99 PULMONARY EMBOLISM AND INFARCTION (H): ICD-10-CM

## 2019-10-03 DIAGNOSIS — Z86.711 HISTORY OF PULMONARY EMBOLISM: ICD-10-CM

## 2019-10-03 DIAGNOSIS — Z86.718 HISTORY OF DEEP VENOUS THROMBOSIS: ICD-10-CM

## 2019-10-03 LAB — INR BLD: 4 (ref 0.86–1.14)

## 2019-10-03 PROCEDURE — 36416 COLLJ CAPILLARY BLOOD SPEC: CPT | Mod: ZL | Performed by: NURSE PRACTITIONER

## 2019-10-03 PROCEDURE — 85610 PROTHROMBIN TIME: CPT | Mod: QW,ZL | Performed by: NURSE PRACTITIONER

## 2019-10-03 NOTE — PROGRESS NOTES
SUBJECTIVE:   CC: Frances Corea is an 59 year old woman who presents for preventive health visit.         Healthy Habits:    Do you get at least three servings of calcium containing foods daily (dairy, green leafy vegetables, etc.)? yes    Amount of exercise or daily activities, outside of work: 2-3 day(s) per week    Problems taking medications regularly No    Medication side effects: No    Have you had an eye exam in the past two years? yes    Do you see a dentist twice per year? yes    Do you have sleep apnea, excessive snoring or daytime drowsiness?yes      Hyperlipidemia Follow-Up    Are you having any of the following symptoms? (Select all that apply)  No complaints of shortness of breath, chest pain or pressure.  No increased sweating or nausea with activity.  No left-sided neck or arm pain.  No complaints of pain in calves when walking 1-2 blocks.    Are you regularly taking any medication or supplement to lower your cholesterol?   Yes- simvastatin    Are you having muscle aches or other side effects that you think could be caused by your cholesterol lowering medication?  No      Hypertension Follow-up    Do you check your blood pressure regularly outside of the clinic? Yes     Are you following a low salt diet? Yes    Are your blood pressures ever more than 140 on the top number (systolic) OR more   than 90 on the bottom number (diastolic), for example 140/90? Yes       Anxiety Follow-Up    How are you doing with your depression since your last visit? No change    How are you doing with your anxiety since your last visit?  No change    Are you having other symptoms that might be associated with depression or anxiety? No    Have you had a significant life event? No     Do you have any concerns with your use of alcohol or other drugs? No      Social History     Tobacco Use     Smoking status: Never Smoker     Smokeless tobacco: Never Used   Substance Use Topics     Alcohol use: No     Drug use: No        Suicide Assessment Five-step Evaluation and Treatment (SAFE-T)        PHQ-9 SCORE 4/17/2018 3/20/2019 10/7/2019   PHQ-9 Total Score 7 6 4     CHAVEZ-7 SCORE 4/17/2018 3/20/2019 10/7/2019   Total Score 7 6 4         Insomnia - on Ambien PRN        RADHA - uses CPAP - face to face visit updated today, length of need lifetime, Dx - RADHA      History of PE, History of DVT   Monitored by coumadin clinic, INR UTD, next Oct 17th        Hypothyroidism Follow-up    Since last visit, patient describes the following symptoms: Weight stable, no hair loss, no skin changes, no constipation, no loose stools      Abuse: Current or Past(Physical, Sexual or Emotional)- No  Do you feel safe in your environment? Yes      Social History     Tobacco Use     Smoking status: Never Smoker     Smokeless tobacco: Never Used   Substance Use Topics     Alcohol use: No       If you drink alcohol do you typically have >3 drinks per day or >7 drinks per week? Yes - AUDIT SCORE:     No flowsheet data found.                         Reviewed orders with patient.  Reviewed health maintenance and updated orders accordingly - Yes  Lab work is in process  Labs reviewed in EPIC  BP Readings from Last 3 Encounters:   10/07/19 112/72   04/29/19 108/60   03/20/19 118/74    Wt Readings from Last 3 Encounters:   10/07/19 127.7 kg (281 lb 8 oz)   04/29/19 127 kg (280 lb)   03/20/19 127 kg (280 lb)                  Patient Active Problem List   Diagnosis     Undifferentiated schizophrenia (H)     History of pulmonary embolism     Chronic anticoagulation     Hyperlipidemia with target LDL less than 100     Gastroesophageal reflux disease without esophagitis     Anxiety     Primary insomnia     Psoriasis     Bipolar 2 disorder (H)     ACP (advance care planning)     Benign essential hypertension     History of deep venous thrombosis     Morbid obesity (H)     Encounter for monitoring statin therapy     Aspirin contraindicated     Acquired hypothyroidism     BiPAP  (biphasic positive airway pressure) dependence     Vitamin D deficiency     Encounter for lithium monitoring     Chronic pain of both knees     Reactive airway disease that is not asthma     Chronic fatigue     RADHA on CPAP     Schizoaffective disorder, chronic condition with acute exacerbation (H)     Pulmonary embolism (H)     Dysphagia as late effect of cerebrovascular disease     Borderline personality disorder (H)     Wheezing     Chronic, continuous use of opioids     Past Surgical History:   Procedure Laterality Date     GYN SURGERY      total hyst     HC ECP WITH CATARACT SURGERY      lt eye     HC OR OCULAR DEVICE INTRAOP DETACHED RETINA       HYSTERECTOMY, PAP NO LONGER INDICATED N/A      REPAIR LACERATION HAND  9/28/2013    Procedure: REPAIR LACERATION HAND;  repair left fifth finger laceration;  Surgeon: Miranda Xavier DO;  Location: [facility]       Social History     Tobacco Use     Smoking status: Never Smoker     Smokeless tobacco: Never Used   Substance Use Topics     Alcohol use: No     Family History   Problem Relation Age of Onset     Ovarian Cancer Mother      Cerebrovascular Disease Father          Current Outpatient Medications   Medication Sig Dispense Refill     Acetaminophen (TYLENOL PO) Take 500 mg by mouth       albuterol (PROVENTIL) (2.5 MG/3ML) 0.083% neb solution Take 1 vial (2.5 mg) by nebulization every 6 hours as needed for shortness of breath / dyspnea or wheezing Twice daily 1 Box 3     clobetasol (TEMOVATE) 0.05 % ointment APPLY TOPICALLY TWICE DAILY ON THE BACK OF THE NECK AND HAIRLINE 60 g 0     famotidine (PEPCID) 20 MG tablet Take 20 mg by mouth daily       latanoprost (XALATAN) 0.005 % ophthalmic solution Place 1 drop into both eyes At Bedtime       lisinopril (PRINIVIL/ZESTRIL) 10 MG tablet TAKE 1 TABLET(10 MG) BY MOUTH DAILY 90 tablet 1     lisinopril (PRINIVIL/ZESTRIL) 10 MG tablet Take 1 tablet (10 mg) by mouth daily 3 tablet 0     lithium (ESKALITH) 600 MG capsule Take 600  mg by mouth       lithium 300 MG tablet Take 300 mg by mouth Take 1tab every  am and 2pm, 2 tabs at HS       LORAZEPAM PO Take 0.5 mg by mouth 1-2 twice daily as needed       MELATONIN PO Take 3 mg by mouth At Bedtime Pt takes at 8 PM       methimazole (TAPAZOLE) 5 MG tablet TK 1 T PO QD  5     mometasone (ELOCON) 0.1 % cream APPLY SPARINGLY EXTERNALLY TO THE AFFECTED AREA TWICE DAILY AS NEEDED. DO NOT APPLY TO FACE 45 g 0     Multiple Vitamins-Minerals (MULTIVITAMIN & MINERAL PO) Take by mouth daily        nitroGLYcerin (NITROSTAT) 0.4 MG sublingual tablet Place 0.4 mg under the tongue as needed 1 tab SL every 5 minutes PRN for angina as directed by physician       nystatin (MYCOSTATIN) 807383 UNIT/GM external powder Apply 1 Dose topically 2 times daily       OLANZapine (ZYPREXA) 20 MG tablet Take 1 tablet by mouth 2 times daily  2     order for DME Equipment being ordered:  BIPAP and supply renewal    Face to face visit occurred 4/29/19  Length of need - lifetime 1 Device 0     order for DME Equipment being ordered: Neb machine and tubing      Face to face visit 4/29/19  Length of need - lifetime  Has been using nebs since 12/23/13 1 Device 0     order for DME Equipment being ordered:    BiPAP machine and supplies  DX Z99.89        G47.33        R09.89 1 Device 0     order for DME Equipment being ordered: Bipap and supplies - states it has been 5 years    Cushion for Bipap machine 1 Device 3     order for DME Equipment being ordered: Neb machine and tubing 1 Device 0     order for DME Wheeled walker 1 Device 0     sennosides (SENOKOT) 8.6 MG tablet Take 2 tablets by mouth daily as needed for constipation (Patient taking differently: Take 3 tablets by mouth daily ) 120 tablet 1     simvastatin (ZOCOR) 40 MG tablet Take 1 tablet (40 mg) by mouth At Bedtime 90 tablet 1     traMADol (ULTRAM) 50 MG tablet TAKE 1 TO 2 TABLETS BY MOUTH EVERY 6 HOURS AS NEEDED FOR PAIN. MAX OF 6 TABLETS DAILY. 60 tablet 0      triamcinolone (KENALOG) 0.1 % external ointment APPLY TOPICALLY TWICE DAILY TO BACK OF NECK AND HAIRLINE 80 g 1     UNABLE TO FIND Home nebulizer       Vitamin D, Cholecalciferol, 1000 units CAPS Take 1 capsule by mouth daily       warfarin ANTICOAGULANT (COUMADIN) 5 MG tablet 7.5mg (1 1/2 pills) Mon,Wed,Fri and 10mg (2 pills) all other days or as directed by warfarin clinic 180 tablet 3     zolpidem (AMBIEN) 10 MG tablet Take 10 mg by mouth At Bedtime  2     Allergies   Allergen Reactions     Depakote      Increased lfts and ammonia     Zyprexa Other (See Comments)     Increased lft and increased ammonia.     Adhesive Tape Rash     Levaquin          Recent Labs   Lab Test 08/26/19  1305 08/23/19  1334 02/12/19  1041 08/13/18  1340 04/24/18  1129 12/26/17  1508   A1C  --  5.1 5.4  --   --  4.9   LDL 73  --  84  --  74 117*   HDL 63  --  58  --  52 65   TRIG 105  --  93  --  130 153*   ALT 34  --  21 21 21 26   CR 0.60  --  0.69 0.77 0.53 0.56   GFRESTIMATED >90  --  >90 77 >90 >90   GFRESTBLACK >90  --  >90 >90 >90 >90   POTASSIUM 4.3  --  3.9 4.5 4.3 4.5   TSH 1.66  --  2.92  --  1.82  --             Pertinent mammograms are reviewed under the imaging tab.      History of abnormal Pap smear: NO - age 30-65 PAP every 5 years with negative HPV co-testing recommended     Reviewed and updated as needed this visit by clinical staff  Tobacco  Allergies  Meds  Med Hx  Surg Hx  Fam Hx  Soc Hx        Reviewed and updated as needed this visit by Provider        Past Medical History:   Diagnosis Date     Acquired hypothyroidism 12/26/2017     Anxiety 3/17/2015     Aspirin contraindicated 8/8/2017     Benign essential hypertension 12/30/2016     BiPAP (biphasic positive airway pressure) dependence 12/26/2017     Bipolar 2 disorder (H) 5/2/2016     Bipolar depression (H)      Chronic anticoagulation 2010     Chronic constipation 8/2/2016     Chronic deep vein thrombosis (DVT) of left lower extremity (H) 8/2/2016      Chronic fatigue 4/17/2018     Chronic pain of both knees 4/16/2018     COB (chronic obstructive bronchitis) (H)      Dyslipidemia      Encounter for lithium monitoring 4/16/2018     Encounter for monitoring statin therapy 8/8/2017     Esophageal reflux 3/17/2015     History of deep venous thrombosis 12/30/2016     History of psychiatric hospitalization     Multiple     Hyperlipidemia LDL goal < 100 3/17/2015     Insomnia 3/17/2015     Morbid obesity with BMI of 45.0-49.9, adult (H)      Obesity 3/17/2015     RADHA (obstructive sleep apnea)     Bipap 12-18/8-12, 4L O2     RADHA (obstructive sleep apnea) 10/5/2013     PE (pulmonary embolism) 2010    has been on chronic anticoagulation therapy.      Psoriasis 3/17/2015     RAD (reactive airway disease)     mild, intermittent     Reactive airway disease that is not asthma 4/17/2018     Schizoaffective disorder (H)      Sleep apnea      Vitamin D deficiency      Vitamin D deficiency 12/26/2017     Wheezing 4/29/2019          Past Surgical History:   Procedure Laterality Date     GYN SURGERY      total hyst     HC ECP WITH CATARACT SURGERY      lt eye     HC OR OCULAR DEVICE INTRAOP DETACHED RETINA       HYSTERECTOMY, PAP NO LONGER INDICATED N/A      REPAIR LACERATION HAND  9/28/2013    Procedure: REPAIR LACERATION HAND;  repair left fifth finger laceration;  Surgeon: Miranda Xavier DO;  Location: [facility]         OB History   No obstetric history on file.       ROS:  CONSTITUTIONAL: NEGATIVE for fever, chills, change in weight  INTEGUMENTARY/SKIN: NEGATIVE for worrisome rashes, moles or lesions  EYES: NEGATIVE for vision changes or irritation  ENT: NEGATIVE for ear, mouth and throat problems  RESP: NEGATIVE for significant cough or SOB  BREAST: NEGATIVE for masses, tenderness or discharge  CV: NEGATIVE for chest pain, palpitations or peripheral edema  GI: NEGATIVE for nausea, abdominal pain, heartburn, or change in bowel habits  : NEGATIVE for unusual urinary or vaginal  symptoms. No vaginal bleeding.  NEURO: NEGATIVE for weakness, dizziness or paresthesias  PSYCHIATRIC: NEGATIVE for changes in mood or affect     OBJECTIVE:   /72 (BP Location: Left arm, Patient Position: Sitting, Cuff Size: Adult Regular)   Pulse 83   Temp 99.5  F (37.5  C) (Tympanic)   Wt 127.7 kg (281 lb 8 oz)   SpO2 93%   BMI 39.26 kg/m         EXAM:  GENERAL: healthy, alert and no distress  EYES: Eyes grossly normal to inspection, PERRL and conjunctivae and sclerae normal  HENT: ear canals and TM's normal, nose and mouth without ulcers or lesions  NECK: no adenopathy, no asymmetry, masses, or scars and thyroid normal to palpation  RESP: lungs clear to auscultation - no rales, rhonchi or wheezes  CV: regular rate and rhythm, normal S1 S2, no S3 or S4, no murmur, click or rub, no peripheral edema and peripheral pulses strong  SKIN: no suspicious lesions or rashes  PSYCH: mentation appears normal, affect normal/bright      Diagnostic Test Results:  Results for orders placed or performed in visit on 10/03/19   INR point of care ( [facility] clinic ONLY)   Result Value Ref Range    INR Point of Care 4.0 (H) 0.86 - 1.14       ASSESSMENT/PLAN:   1. Routine general medical examination at a health care facility  - Annual physical 1 year    2. Acquired hypothyroidism  - Continue plan of care    3. Hyperlipidemia with target LDL less than 100  - Continue plan of care    4. Encounter for monitoring statin therapy  - Lab UTD    5. Gastroesophageal reflux disease without esophagitis  - Continue plan of care    6. History of deep venous thrombosis  - Continue plan of care    7. History of pulmonary embolism  - Continue plan of care    8. Benign essential hypertension  - Continue plan of care    9. Anxiety  - Continue plan of care    10. Primary insomnia  - Continue plan of care    11. RADHA on CPAP  - Face to face visit today    12. Screening for breast cancer  - MA SCREENING DIGITAL BILATERAL ([place]); Future    13. Atopic  "dermatitis, unspecified type  - mometasone (ELOCON) 0.1 % external cream; APPLY SPARINGLY EXTERNALLY TO THE AFFECTED AREA TWICE DAILY AS NEEDED. DO NOT APPLY TO FACE  Dispense: 45 g; Refill: 5    14. Reactive airway disease that is not asthma  - albuterol (PROVENTIL) (2.5 MG/3ML) 0.083% neb solution; Take 1 vial (2.5 mg) by nebulization every 6 hours as needed for shortness of breath / dyspnea or wheezing Twice daily  Dispense: 1 Box; Refill: 3    15. Left knee pain, unspecified chronicity  - traMADol (ULTRAM) 50 MG tablet; TAKE 1 TO 2 TABLETS BY MOUTH EVERY 6 HOURS AS NEEDED FOR PAIN. MAX OF 6 TABLETS DAILY.  Dispense: 60 tablet; Refill: 0    16. Encounter for Medicare annual wellness exam  - MA SCREENING DIGITAL BILATERAL ([place]); Future    17. Fibrocystic breast changes, unspecified laterality  - Mammogram ordered    18. Encounter for screening mammogram for malignant neoplasm of breast   - MA SCREENING DIGITAL BILATERAL ([place]); Future        COUNSELING:   Reviewed preventive health counseling, as reflected in patient instructions       Regular exercise       Healthy diet/nutrition    Estimated body mass index is 39.26 kg/m  as calculated from the following:    Height as of 4/29/19: 1.803 m (5' 11\").    Weight as of this encounter: 127.7 kg (281 lb 8 oz).         reports that she has never smoked. She has never used smokeless tobacco.      Counseling Resources:  ATP IV Guidelines  Pooled Cohorts Equation Calculator  Breast Cancer Risk Calculator  FRAX Risk Assessment  ICSI Preventive Guidelines  Dietary Guidelines for Americans, 2010  USDA's MyPlate  ASA Prophylaxis  Lung CA Screening    Ayah Rojas CNP  [facility] [place] [facility]  "

## 2019-10-03 NOTE — PROGRESS NOTES
ANTICOAGULATION FOLLOW-UP CLINIC VISIT    Patient Name:  Frances Corea  Date:  10/3/2019  Contact Type:  Telephone/ message left on voicemail    SUBJECTIVE:  Patient Findings     Comments:   INR done by lab. Call placed to patient mother as instructed and message left re: INR result, warfarin dosing/INR recheck date. Requested notification of warfarin clinic if patient has any unusual bleeding/bruising or new changes in diet/meds/activity.         Clinical Outcomes     Negatives:   Major bleeding event, Thromboembolic event, Anticoagulation-related hospital admission, Anticoagulation-related ED visit, Anticoagulation-related fatality    Comments:   INR done by lab. Call placed to patient mother as instructed and message left re: INR result, warfarin dosing/INR recheck date. Requested notification of warfarin clinic if patient has any unusual bleeding/bruising or new changes in diet/meds/activity.            OBJECTIVE    INR Point of Care   Date Value Ref Range Status   10/03/2019 4.0 (H) 0.86 - 1.14 Final     Comment:     This test is intended for monitoring Coumadin therapy.  Results are not   accurate in patients with prolonged INR due to factor deficiency.         ASSESSMENT / PLAN  INR assessment SUPRA    Recheck INR In: 2 WEEKS    INR Location Clinic      Anticoagulation Summary  As of 10/3/2019    INR goal:   2.0-3.0   TTR:   34.8 % (1.7 y)   INR used for dosin.0! (10/3/2019)   Warfarin maintenance plan:   10 mg (5 mg x 2) every Mon, Fri; 7.5 mg (5 mg x 1.5) all other days   Full warfarin instructions:   10/3: 2.5 mg; 10/4: 7.5 mg; Otherwise 10 mg every Mon, Fri; 7.5 mg all other days   Weekly warfarin total:   57.5 mg   Plan last modified:   Stefany Huynh RN (10/3/2019)   Next INR check:   10/17/2019   Target end date:   Indefinite    Indications    History of deep venous thrombosis [Z86.718]  History of pulmonary embolism [Z86.711]  Long-term (current) use of anticoagulants [Z79.01] (Resolved)  [Z79.01]             Anticoagulation Episode Summary     INR check location:       Preferred lab:       Send INR reminders to:   HC ANTICOAG POOL    Comments:   [facility] visits W; phone        Anticoagulation Care Providers     Provider Role Specialty Phone number    Ayah Rojas CNP [facility] [phone]            See the Encounter Report to view Anticoagulation Flowsheet and Dosing Calendar (Go to Encounters tab in chart review, and find the Anticoagulation Therapy Visit)        Stefany Huynh RN

## 2019-10-07 ENCOUNTER — OFFICE VISIT (OUTPATIENT)
Dept: FAMILY MEDICINE | Facility: OTHER | Age: 59
End: 2019-10-07
Attending: NURSE PRACTITIONER
Payer: COMMERCIAL

## 2019-10-07 VITALS
HEART RATE: 83 BPM | OXYGEN SATURATION: 93 % | SYSTOLIC BLOOD PRESSURE: 112 MMHG | BODY MASS INDEX: 39.26 KG/M2 | DIASTOLIC BLOOD PRESSURE: 72 MMHG | TEMPERATURE: 99.5 F | WEIGHT: 281.5 LBS

## 2019-10-07 DIAGNOSIS — M25.562 LEFT KNEE PAIN, UNSPECIFIED CHRONICITY: ICD-10-CM

## 2019-10-07 DIAGNOSIS — F51.01 PRIMARY INSOMNIA: ICD-10-CM

## 2019-10-07 DIAGNOSIS — Z12.39 SCREENING FOR BREAST CANCER: ICD-10-CM

## 2019-10-07 DIAGNOSIS — Z79.899 ENCOUNTER FOR MONITORING STATIN THERAPY: ICD-10-CM

## 2019-10-07 DIAGNOSIS — J98.9 REACTIVE AIRWAY DISEASE THAT IS NOT ASTHMA: ICD-10-CM

## 2019-10-07 DIAGNOSIS — Z86.718 HISTORY OF DEEP VENOUS THROMBOSIS: ICD-10-CM

## 2019-10-07 DIAGNOSIS — G47.33 OSA ON CPAP: ICD-10-CM

## 2019-10-07 DIAGNOSIS — I10 BENIGN ESSENTIAL HYPERTENSION: ICD-10-CM

## 2019-10-07 DIAGNOSIS — L20.9 ATOPIC DERMATITIS, UNSPECIFIED TYPE: ICD-10-CM

## 2019-10-07 DIAGNOSIS — Z86.711 HISTORY OF PULMONARY EMBOLISM: ICD-10-CM

## 2019-10-07 DIAGNOSIS — Z00.00 ROUTINE GENERAL MEDICAL EXAMINATION AT A HEALTH CARE FACILITY: Primary | ICD-10-CM

## 2019-10-07 DIAGNOSIS — N60.19 FIBROCYSTIC BREAST CHANGES, UNSPECIFIED LATERALITY: ICD-10-CM

## 2019-10-07 DIAGNOSIS — F41.9 ANXIETY: ICD-10-CM

## 2019-10-07 DIAGNOSIS — Z12.31 ENCOUNTER FOR SCREENING MAMMOGRAM FOR MALIGNANT NEOPLASM OF BREAST: ICD-10-CM

## 2019-10-07 DIAGNOSIS — Z00.00 ENCOUNTER FOR MEDICARE ANNUAL WELLNESS EXAM: ICD-10-CM

## 2019-10-07 DIAGNOSIS — E03.9 ACQUIRED HYPOTHYROIDISM: ICD-10-CM

## 2019-10-07 DIAGNOSIS — Z51.81 ENCOUNTER FOR MONITORING STATIN THERAPY: ICD-10-CM

## 2019-10-07 DIAGNOSIS — K21.9 GASTROESOPHAGEAL REFLUX DISEASE WITHOUT ESOPHAGITIS: ICD-10-CM

## 2019-10-07 DIAGNOSIS — E78.5 HYPERLIPIDEMIA WITH TARGET LDL LESS THAN 100: ICD-10-CM

## 2019-10-07 PROCEDURE — 99396 PREV VISIT EST AGE 40-64: CPT | Performed by: NURSE PRACTITIONER

## 2019-10-07 RX ORDER — MOMETASONE FUROATE 1 MG/G
CREAM TOPICAL
Qty: 45 G | Refills: 5 | Status: SHIPPED | OUTPATIENT
Start: 2019-10-07

## 2019-10-07 RX ORDER — TRAMADOL HYDROCHLORIDE 50 MG/1
TABLET ORAL
Qty: 60 TABLET | Refills: 0 | Status: SHIPPED | OUTPATIENT
Start: 2019-10-07 | End: 2019-11-06

## 2019-10-07 RX ORDER — ALBUTEROL SULFATE 0.83 MG/ML
2.5 SOLUTION RESPIRATORY (INHALATION) EVERY 6 HOURS PRN
Qty: 1 BOX | Refills: 3 | Status: SHIPPED | OUTPATIENT
Start: 2019-10-07 | End: 2020-05-26

## 2019-10-07 ASSESSMENT — ANXIETY QUESTIONNAIRES
6. BECOMING EASILY ANNOYED OR IRRITABLE: NOT AT ALL
GAD7 TOTAL SCORE: 4
7. FEELING AFRAID AS IF SOMETHING AWFUL MIGHT HAPPEN: NOT AT ALL
3. WORRYING TOO MUCH ABOUT DIFFERENT THINGS: SEVERAL DAYS
2. NOT BEING ABLE TO STOP OR CONTROL WORRYING: SEVERAL DAYS
5. BEING SO RESTLESS THAT IT IS HARD TO SIT STILL: NOT AT ALL
IF YOU CHECKED OFF ANY PROBLEMS ON THIS QUESTIONNAIRE, HOW DIFFICULT HAVE THESE PROBLEMS MADE IT FOR YOU TO DO YOUR WORK, TAKE CARE OF THINGS AT HOME, OR GET ALONG WITH OTHER PEOPLE: SOMEWHAT DIFFICULT
4. TROUBLE RELAXING: SEVERAL DAYS
1. FEELING NERVOUS, ANXIOUS, OR ON EDGE: SEVERAL DAYS

## 2019-10-07 ASSESSMENT — PATIENT HEALTH QUESTIONNAIRE - PHQ9: SUM OF ALL RESPONSES TO PHQ QUESTIONS 1-9: 4

## 2019-10-07 ASSESSMENT — PAIN SCALES - GENERAL: PAINLEVEL: NO PAIN (0)

## 2019-10-07 NOTE — NURSING NOTE
"Chief Complaint   Patient presents with     Physical       Initial /72 (BP Location: Left arm, Patient Position: Sitting, Cuff Size: Adult Regular)   Pulse 83   Temp 99.5  F (37.5  C) (Tympanic)   Wt 127.7 kg (281 lb 8 oz)   SpO2 93%   BMI 39.26 kg/m   Estimated body mass index is 39.26 kg/m  as calculated from the following:    Height as of 4/29/19: 1.803 m (5' 11\").    Weight as of this encounter: 127.7 kg (281 lb 8 oz).  Medication Reconciliation: complete  Ashley A. Lechevalier, LPN  "

## 2019-10-08 ASSESSMENT — ANXIETY QUESTIONNAIRES: GAD7 TOTAL SCORE: 4

## 2019-10-17 ENCOUNTER — ANTICOAGULATION THERAPY VISIT (OUTPATIENT)
Dept: ANTICOAGULATION | Facility: OTHER | Age: 59
End: 2019-10-17
Attending: NURSE PRACTITIONER
Payer: MEDICARE

## 2019-10-17 DIAGNOSIS — Z79.01 LONG TERM CURRENT USE OF ANTICOAGULANT THERAPY: ICD-10-CM

## 2019-10-17 DIAGNOSIS — Z86.718 HISTORY OF DEEP VENOUS THROMBOSIS: ICD-10-CM

## 2019-10-17 DIAGNOSIS — I26.99 PULMONARY EMBOLISM AND INFARCTION (H): ICD-10-CM

## 2019-10-17 DIAGNOSIS — Z86.711 HISTORY OF PULMONARY EMBOLISM: ICD-10-CM

## 2019-10-17 LAB — INR BLD: 2.8 (ref 0.86–1.14)

## 2019-10-17 PROCEDURE — 85610 PROTHROMBIN TIME: CPT | Mod: QW,ZL | Performed by: NURSE PRACTITIONER

## 2019-10-17 PROCEDURE — 36416 COLLJ CAPILLARY BLOOD SPEC: CPT | Mod: ZL | Performed by: NURSE PRACTITIONER

## 2019-10-17 NOTE — PROGRESS NOTES
ANTICOAGULATION FOLLOW-UP CLINIC VISIT    Patient Name:  Frances Corea  Date:  10/17/2019  Contact Type:  Telephone/ message left on her mom, Frances (guardian) voicemail    SUBJECTIVE:  Patient Findings     Comments:   INR done by lab. Call placed to patient mother (guardian) and message left on voicemail re: INR result, warfarin dosing/INR recheck date. Her mom is to call warfarin clinic if patient has any unusual bleeding/bruising or new changes in diet/meds/activity or questions.         Clinical Outcomes     Negatives:   Major bleeding event, Thromboembolic event, Anticoagulation-related hospital admission, Anticoagulation-related ED visit, Anticoagulation-related fatality    Comments:   INR done by lab. Call placed to patient mother (guardian) and message left on voicemail re: INR result, warfarin dosing/INR recheck date. Her mom is to call warfarin clinic if patient has any unusual bleeding/bruising or new changes in diet/meds/activity or questions.            OBJECTIVE    INR Point of Care   Date Value Ref Range Status   10/17/2019 2.8 (H) 0.86 - 1.14 Final     Comment:     This test is intended for monitoring Coumadin therapy.  Results are not   accurate in patients with prolonged INR due to factor deficiency.         ASSESSMENT / PLAN  INR assessment THER    Recheck INR In: 5 WEEKS    INR Location Clinic      Anticoagulation Summary  As of 10/17/2019    INR goal:   2.0-3.0   TTR:   34.4 % (1.8 y)   INR used for dosin.8 (10/17/2019)   Warfarin maintenance plan:   10 mg (5 mg x 2) every Mon, Fri; 7.5 mg (5 mg x 1.5) all other days   Full warfarin instructions:   10 mg every Mon, Fri; 7.5 mg all other days   Weekly warfarin total:   57.5 mg   No change documented:   Stefany Huynh, RN   Plan last modified:   Stefany Huynh RN (10/3/2019)   Next INR check:   2019   Target end date:   Indefinite    Indications    History of deep venous thrombosis [Z86.718]  History of pulmonary embolism  [Z86.711]  Long-term (current) use of anticoagulants [Z79.01] (Resolved) [Z79.01]             Anticoagulation Episode Summary     INR check location:       Preferred lab:       Send INR reminders to:   HC ANTICOAG POOL    Comments:   [facility] visits W; phone        Anticoagulation Care Providers     Provider Role Specialty Phone number    Ayah Rojas CNP [facility] [phone]            See the Encounter Report to view Anticoagulation Flowsheet and Dosing Calendar (Go to Encounters tab in chart review, and find the Anticoagulation Therapy Visit)        Stefany Huynh RN

## 2019-11-05 ENCOUNTER — ANCILLARY PROCEDURE (OUTPATIENT)
Dept: MAMMOGRAPHY | Facility: OTHER | Age: 59
End: 2019-11-05
Attending: NURSE PRACTITIONER
Payer: MEDICARE

## 2019-11-05 DIAGNOSIS — Z00.00 ENCOUNTER FOR MEDICARE ANNUAL WELLNESS EXAM: ICD-10-CM

## 2019-11-05 DIAGNOSIS — Z12.39 SCREENING FOR BREAST CANCER: ICD-10-CM

## 2019-11-05 DIAGNOSIS — Z12.31 ENCOUNTER FOR SCREENING MAMMOGRAM FOR MALIGNANT NEOPLASM OF BREAST: ICD-10-CM

## 2019-11-05 PROCEDURE — 77063 BREAST TOMOSYNTHESIS BI: CPT | Mod: TC

## 2019-11-06 DIAGNOSIS — M25.562 LEFT KNEE PAIN, UNSPECIFIED CHRONICITY: ICD-10-CM

## 2019-11-06 RX ORDER — TRAMADOL HYDROCHLORIDE 50 MG/1
TABLET ORAL
Qty: 60 TABLET | Refills: 0 | Status: SHIPPED | OUTPATIENT
Start: 2019-11-06 | End: 2019-12-06

## 2019-11-06 NOTE — TELEPHONE ENCOUNTER
Tramadol   Last Written Prescription Date:  10/7/29762  Last Fill Quantity: 60,   # refills: 0  Last Office Visit: 10/7/2019  Future Office visit:       Routing refill request to provider for review/approval because:  Drug not on the [facility], [facility] or [facility] refill protocol or controlled substance

## 2019-11-22 ENCOUNTER — ANTICOAGULATION THERAPY VISIT (OUTPATIENT)
Dept: ANTICOAGULATION | Facility: OTHER | Age: 59
End: 2019-11-22
Attending: NURSE PRACTITIONER
Payer: MEDICARE

## 2019-11-22 DIAGNOSIS — Z86.711 HISTORY OF PULMONARY EMBOLISM: ICD-10-CM

## 2019-11-22 DIAGNOSIS — I26.99 PULMONARY EMBOLISM AND INFARCTION (H): ICD-10-CM

## 2019-11-22 DIAGNOSIS — Z79.01 LONG TERM CURRENT USE OF ANTICOAGULANT THERAPY: ICD-10-CM

## 2019-11-22 DIAGNOSIS — Z86.718 HISTORY OF DEEP VENOUS THROMBOSIS: ICD-10-CM

## 2019-11-22 LAB
CAPILLARY BLOOD COLLECTION: NORMAL
INR BLD: 2.7 (ref 0.86–1.14)

## 2019-11-22 PROCEDURE — 85610 PROTHROMBIN TIME: CPT | Mod: QW,ZL | Performed by: NURSE PRACTITIONER

## 2019-11-22 PROCEDURE — 36416 COLLJ CAPILLARY BLOOD SPEC: CPT | Mod: ZL | Performed by: NURSE PRACTITIONER

## 2019-11-22 NOTE — PROGRESS NOTES
ANTICOAGULATION FOLLOW-UP CLINIC VISIT    Patient Name:  Frances Corea  Date:  2019  Contact Type:  Telephone/ message left on home phone re: warfarin dosing/INR recheck date    SUBJECTIVE:  Patient Findings     Comments:   Received INR results from lab. Call placed to patient and message left on home voicemail re: INR results, warfarin dosing/INR recheck date. Patient to return call to warfarin clinic with changes in bruising/bleeding, new changes in diet/meds/activity or questions.          Clinical Outcomes     Negatives:   Major bleeding event, Thromboembolic event, Anticoagulation-related hospital admission, Anticoagulation-related ED visit, Anticoagulation-related fatality    Comments:   Received INR results from lab. Call placed to patient and message left on home voicemail re: INR results, warfarin dosing/INR recheck date. Patient to return call to warfarin clinic with changes in bruising/bleeding, new changes in diet/meds/activity or questions.             OBJECTIVE    INR Point of Care   Date Value Ref Range Status   2019 2.7 (H) 0.86 - 1.14 Final     Comment:     This test is intended for monitoring Coumadin therapy.  Results are not   accurate in patients with prolonged INR due to factor deficiency.         ASSESSMENT / PLAN  INR assessment THER    Recheck INR In: 6 WEEKS    INR Location Clinic      Anticoagulation Summary  As of 2019    INR goal:   2.0-3.0   TTR:   39.1 % (11 mo)   INR used for dosin.7 (2019)   Warfarin maintenance plan:   10 mg (5 mg x 2) every Mon, Fri; 7.5 mg (5 mg x 1.5) all other days   Full warfarin instructions:   10 mg every Mon, Fri; 7.5 mg all other days   Weekly warfarin total:   57.5 mg   No change documented:   Jane, Sabrina, RN   Plan last modified:   Stefany Huynh RN (10/3/2019)   Next INR check:   1/3/2020   Target end date:   Indefinite    Indications    History of deep venous thrombosis [Z86.718]  History of pulmonary embolism  [Z86.711]  Long-term (current) use of anticoagulants [Z79.01] (Resolved) [Z79.01]             Anticoagulation Episode Summary     INR check location:       Preferred lab:       Send INR reminders to:   HC ANTICOAG POOL    Comments:   [facility] W; phone        Anticoagulation Care Providers     Provider Role Specialty Phone number    Crystal ELLEN Poon [facility] [phone]            See the Encounter Report to view Anticoagulation Flowsheet and Dosing Calendar (Go to Encounters tab in chart review, and find the Anticoagulation Therapy Visit)        Sabrina Lara RN

## 2019-12-04 DIAGNOSIS — M25.562 LEFT KNEE PAIN, UNSPECIFIED CHRONICITY: ICD-10-CM

## 2019-12-04 NOTE — TELEPHONE ENCOUNTER
traMADol (ULTRAM) 50 MG tablet  Last visit date with prescribing provider: 10-7-2019  Last refill date: 11-6-2019  Quantity: 60, Refills: 0    Doris Huerta LPN

## 2019-12-06 ENCOUNTER — TRANSFERRED RECORDS (OUTPATIENT)
Dept: HEALTH INFORMATION MANAGEMENT | Facility: CLINIC | Age: 59
End: 2019-12-06

## 2019-12-06 RX ORDER — TRAMADOL HYDROCHLORIDE 50 MG/1
TABLET ORAL
Qty: 60 TABLET | Refills: 0 | Status: SHIPPED | OUTPATIENT
Start: 2019-12-06 | End: 2020-01-03

## 2019-12-13 DIAGNOSIS — Z86.711 HISTORY OF PULMONARY EMBOLISM: ICD-10-CM

## 2019-12-16 NOTE — TELEPHONE ENCOUNTER
Zocor       Last Written Prescription Date:  3/20/2019*  Last Fill Quantity: 90,   # refills: 1  Last Office Visit: 10/07/2019  Future Office visit:

## 2019-12-17 RX ORDER — SIMVASTATIN 40 MG
TABLET ORAL
Qty: 90 TABLET | Refills: 1 | Status: SHIPPED | OUTPATIENT
Start: 2019-12-17 | End: 2020-05-15

## 2019-12-31 DIAGNOSIS — M25.562 LEFT KNEE PAIN, UNSPECIFIED CHRONICITY: ICD-10-CM

## 2020-01-03 ENCOUNTER — ANTICOAGULATION THERAPY VISIT (OUTPATIENT)
Dept: ANTICOAGULATION | Facility: OTHER | Age: 60
End: 2020-01-03
Attending: NURSE PRACTITIONER
Payer: MEDICARE

## 2020-01-03 DIAGNOSIS — Z86.718 HISTORY OF DEEP VENOUS THROMBOSIS: ICD-10-CM

## 2020-01-03 DIAGNOSIS — Z79.01 LONG TERM CURRENT USE OF ANTICOAGULANT THERAPY: ICD-10-CM

## 2020-01-03 DIAGNOSIS — I26.99 PULMONARY EMBOLISM AND INFARCTION (H): ICD-10-CM

## 2020-01-03 DIAGNOSIS — Z86.711 HISTORY OF PULMONARY EMBOLISM: ICD-10-CM

## 2020-01-03 LAB
CAPILLARY BLOOD COLLECTION: NORMAL
INR BLD: 1.7 (ref 0.86–1.14)

## 2020-01-03 PROCEDURE — 85610 PROTHROMBIN TIME: CPT | Mod: QW,ZL | Performed by: NURSE PRACTITIONER

## 2020-01-03 PROCEDURE — 36416 COLLJ CAPILLARY BLOOD SPEC: CPT | Mod: ZL | Performed by: NURSE PRACTITIONER

## 2020-01-03 RX ORDER — TRAMADOL HYDROCHLORIDE 50 MG/1
TABLET ORAL
Qty: 60 TABLET | Refills: 1 | Status: SHIPPED | OUTPATIENT
Start: 2020-01-03 | End: 2020-02-28

## 2020-01-03 NOTE — TELEPHONE ENCOUNTER
Patient called again regarding refill request for tramadol. States they will be at the pharmacy later today and is hoping this can be filled by then. Ashley A. Lechevalier, LPN on 1/3/2020 at 11:30 AM

## 2020-01-03 NOTE — PROGRESS NOTES
ANTICOAGULATION FOLLOW-UP CLINIC VISIT    Patient Name:  Frances Corea  Date:  1/3/2020  Contact Type:  Telephone/ message left on home voicemail re: warfarin dosing/INR recheck date    SUBJECTIVE:  Patient Findings     Comments:   Received INR results from lab. Call placed to patient and message left on home voicemail re: INR results, warfarin dosing/INR recheck date. Patient to return call to warfarin clinic with changes in bruising/bleeding, new changes in diet/meds/activity or questions.          Clinical Outcomes     Comments:   Received INR results from lab. Call placed to patient and message left on home voicemail re: INR results, warfarin dosing/INR recheck date. Patient to return call to warfarin clinic with changes in bruising/bleeding, new changes in diet/meds/activity or questions.             OBJECTIVE    INR Point of Care   Date Value Ref Range Status   2020 1.7 (H) 0.86 - 1.14 Final     Comment:     This test is intended for monitoring Coumadin therapy.  Results are not   accurate in patients with prolonged INR due to factor deficiency.         ASSESSMENT / PLAN  INR assessment SUB    Recheck INR In: 3 WEEKS    INR Location Clinic      Anticoagulation Summary  As of 1/3/2020    INR goal:   2.0-3.0   TTR:   42.4 % (1 y)   INR used for dosin.7! (1/3/2020)   Warfarin maintenance plan:   10 mg (5 mg x 2) every Mon, Fri; 7.5 mg (5 mg x 1.5) all other days   Full warfarin instructions:   1/3: 15 mg; Otherwise 10 mg every Mon, Fri; 7.5 mg all other days   Weekly warfarin total:   57.5 mg   Plan last modified:   Stefany Huynh RN (10/3/2019)   Next INR check:   2020   Priority:   Maintenance   Target end date:   Indefinite    Indications    History of deep venous thrombosis [Z86.718]  History of pulmonary embolism [Z86.711]  Long-term (current) use of anticoagulants [Z79.01] (Resolved) [Z79.01]             Anticoagulation Episode Summary     INR check location:       Preferred lab:        Send INR reminders to:   HC ANTICOAG POOL    Comments:   [facility] W; phone        Anticoagulation Care Providers     Provider Role Specialty Phone number    Ayah Rojas CNP Referring [facility] [phone]            See the Encounter Report to view Anticoagulation Flowsheet and Dosing Calendar (Go to Encounters tab in chart review, and find the Anticoagulation Therapy Visit)        Sabrina Lara RN

## 2020-01-24 ENCOUNTER — ANTICOAGULATION THERAPY VISIT (OUTPATIENT)
Dept: ANTICOAGULATION | Facility: OTHER | Age: 60
End: 2020-01-24
Attending: NURSE PRACTITIONER
Payer: MEDICARE

## 2020-01-24 DIAGNOSIS — Z86.718 HISTORY OF DEEP VENOUS THROMBOSIS: ICD-10-CM

## 2020-01-24 DIAGNOSIS — Z86.711 HISTORY OF PULMONARY EMBOLISM: ICD-10-CM

## 2020-01-24 DIAGNOSIS — I26.99 PULMONARY EMBOLISM AND INFARCTION (H): ICD-10-CM

## 2020-01-24 DIAGNOSIS — Z79.01 LONG TERM CURRENT USE OF ANTICOAGULANT THERAPY: ICD-10-CM

## 2020-01-24 LAB
CAPILLARY BLOOD COLLECTION: NORMAL
INR BLD: 5 (ref 0.86–1.14)

## 2020-01-24 PROCEDURE — 85610 PROTHROMBIN TIME: CPT | Mod: QW,ZL | Performed by: NURSE PRACTITIONER

## 2020-01-24 PROCEDURE — 36416 COLLJ CAPILLARY BLOOD SPEC: CPT | Mod: ZL | Performed by: NURSE PRACTITIONER

## 2020-01-24 NOTE — PROGRESS NOTES
ANTICOAGULATION FOLLOW-UP CLINIC VISIT    Patient Name:  Frances Corea  Date:  2020  Contact Type:  Telephone/ message left on home voicemail RE: warfarin dosing/INR recheck date and what to do with uncontrolled bleeding.     SUBJECTIVE:  Patient Findings     Comments:   Received INR results from lab. Call placed to patient and message left on home voicemail re: INR results, warfarin dosing/INR recheck date. Patient advised to go to ER with any uncontrolled bleeding. Patient to return call to warfarin clinic with changes in bruising/bleeding, new changes in diet/meds/activity or questions.          Clinical Outcomes     Negatives:   Major bleeding event, Thromboembolic event, Anticoagulation-related hospital admission, Anticoagulation-related ED visit, Anticoagulation-related fatality    Comments:   Received INR results from lab. Call placed to patient and message left on home voicemail re: INR results, warfarin dosing/INR recheck date. Patient advised to go to ER with any uncontrolled bleeding. Patient to return call to warfarin clinic with changes in bruising/bleeding, new changes in diet/meds/activity or questions.             OBJECTIVE    INR Point of Care   Date Value Ref Range Status   2020 5.0 (H) 0.86 - 1.14 Final     Comment:     Critical Value called to and read back by  PAT MCDONOUGH AT  AT 1324 BY [facility]  This test is intended for monitoring Coumadin therapy.  Results are not   accurate in patients with prolonged INR due to factor deficiency.         ASSESSMENT / PLAN  INR assessment SUPRA    Recheck INR In: 4 DAYS    INR Location Clinic      Anticoagulation Summary  As of 2020    INR goal:   2.0-3.0   TTR:   42.9 % (1 y)   INR used for dosin.0! (2020)   Warfarin maintenance plan:   10 mg (5 mg x 2) every Mon, Fri; 7.5 mg (5 mg x 1.5) all other days   Full warfarin instructions:   : Hold; Otherwise 10 mg every Mon, Fri; 7.5 mg all other days   Weekly warfarin total:   57.5 mg    Plan last modified:   Stefany Huynh RN (10/3/2019)   Next INR check:   1/28/2020   Priority:   Maintenance   Target end date:   Indefinite    Indications    History of deep venous thrombosis [Z86.718]  History of pulmonary embolism [Z86.711]  Long-term (current) use of anticoagulants [Z79.01] (Resolved) [Z79.01]             Anticoagulation Episode Summary     INR check location:       Preferred lab:       Send INR reminders to:    ANTICOAG POOL    Comments:   [facility] W; phone        Anticoagulation Care Providers     Provider Role Specialty Phone number    Crystal Ayah, CNP Referring [facility] [phone]            See the Encounter Report to view Anticoagulation Flowsheet and Dosing Calendar (Go to Encounters tab in chart review, and find the Anticoagulation Therapy Visit)        Sabrina Lara RN

## 2020-01-24 NOTE — Clinical Note
Patient had 5.0 INR result. Patient to hold coumadin today. Patient to recheck INR on Tuesday. Patient advised to increase vit k intake and to go to ER with any signs of bleeding.

## 2020-01-28 ENCOUNTER — ANTICOAGULATION THERAPY VISIT (OUTPATIENT)
Dept: ANTICOAGULATION | Facility: OTHER | Age: 60
End: 2020-01-28
Attending: NURSE PRACTITIONER
Payer: MEDICARE

## 2020-01-28 DIAGNOSIS — Z86.718 HISTORY OF DEEP VENOUS THROMBOSIS: ICD-10-CM

## 2020-01-28 DIAGNOSIS — I26.99 PULMONARY EMBOLISM AND INFARCTION (H): ICD-10-CM

## 2020-01-28 DIAGNOSIS — Z86.711 HISTORY OF PULMONARY EMBOLISM: ICD-10-CM

## 2020-01-28 DIAGNOSIS — Z79.01 LONG TERM CURRENT USE OF ANTICOAGULANT THERAPY: ICD-10-CM

## 2020-01-28 LAB
CAPILLARY BLOOD COLLECTION: NORMAL
INR BLD: 2.9 (ref 0.86–1.14)

## 2020-01-28 PROCEDURE — 85610 PROTHROMBIN TIME: CPT | Mod: QW,ZL | Performed by: NURSE PRACTITIONER

## 2020-01-28 PROCEDURE — 36416 COLLJ CAPILLARY BLOOD SPEC: CPT | Mod: ZL | Performed by: NURSE PRACTITIONER

## 2020-01-28 NOTE — PROGRESS NOTES
ANTICOAGULATION FOLLOW-UP CLINIC VISIT    Patient Name:  Frances Corea  Date:  2020  Contact Type:  Telephone/ message left on home voicemail RE: warfarin dosing/INR recheck date    SUBJECTIVE:  Patient Findings     Comments:   Received INR results from lab. Call placed to patient and message left on home voicemail re: INR results, warfarin dosing/INR recheck date. Patient to return call to warfarin clinic with changes in bruising/bleeding, new changes in diet/meds/activity or questions.          Clinical Outcomes     Negatives:   Major bleeding event, Thromboembolic event, Anticoagulation-related hospital admission, Anticoagulation-related ED visit, Anticoagulation-related fatality    Comments:   Received INR results from lab. Call placed to patient and message left on home voicemail re: INR results, warfarin dosing/INR recheck date. Patient to return call to warfarin clinic with changes in bruising/bleeding, new changes in diet/meds/activity or questions.             OBJECTIVE    INR Point of Care   Date Value Ref Range Status   2020 2.9 (H) 0.86 - 1.14 Final     Comment:     This test is intended for monitoring Coumadin therapy.  Results are not   accurate in patients with prolonged INR due to factor deficiency.         ASSESSMENT / PLAN  INR assessment THER    Recheck INR In: 6 WEEKS    INR Location Clinic      Anticoagulation Summary  As of 2020    INR goal:   2.0-3.0   TTR:   42.8 % (1 y)   INR used for dosin.9 (2020)   Warfarin maintenance plan:   10 mg (5 mg x 2) every Mon, Fri; 7.5 mg (5 mg x 1.5) all other days   Full warfarin instructions:   10 mg every Mon, Fri; 7.5 mg all other days   Weekly warfarin total:   57.5 mg   No change documented:   Jane, Sabrina, RN   Plan last modified:   Stefany Huynh RN (10/3/2019)   Next INR check:   3/10/2020   Priority:   Maintenance   Target end date:   Indefinite    Indications    History of deep venous thrombosis [Z86.718]  History of  pulmonary embolism [Z86.711]  Long-term (current) use of anticoagulants [Z79.01] (Resolved) [Z79.01]             Anticoagulation Episode Summary     INR check location:       Preferred lab:       Send INR reminders to:   HC ANTICOAG POOL    Comments:   [facility] W; phone        Anticoagulation Care Providers     Provider Role Specialty Phone number    Crystal ELLEN Poon Referring [facility] [phone]            See the Encounter Report to view Anticoagulation Flowsheet and Dosing Calendar (Go to Encounters tab in chart review, and find the Anticoagulation Therapy Visit)        Sabrina Lara RN

## 2020-02-27 DIAGNOSIS — M25.562 LEFT KNEE PAIN, UNSPECIFIED CHRONICITY: ICD-10-CM

## 2020-02-28 RX ORDER — TRAMADOL HYDROCHLORIDE 50 MG/1
TABLET ORAL
Qty: 60 TABLET | Refills: 0 | Status: SHIPPED | OUTPATIENT
Start: 2020-02-28 | End: 2020-03-30

## 2020-02-28 NOTE — TELEPHONE ENCOUNTER
traMADol (ULTRAM) 50 MG tablet      Last Written Prescription Date:  1/3/20  Last Fill Quantity: 60,   # refills: 1  Last Office Visit: 10/7/19  Future Office visit:       Routing refill request to provider for review/approval because:  Drug not on the [facility], [facility] or [facility] refill protocol or controlled substance  /

## 2020-03-09 ENCOUNTER — ANTICOAGULATION THERAPY VISIT (OUTPATIENT)
Dept: ANTICOAGULATION | Facility: OTHER | Age: 60
End: 2020-03-09
Attending: NURSE PRACTITIONER
Payer: MEDICARE

## 2020-03-09 DIAGNOSIS — E66.01 MORBID OBESITY (H): ICD-10-CM

## 2020-03-09 DIAGNOSIS — Z51.81 ENCOUNTER FOR MONITORING STATIN THERAPY: ICD-10-CM

## 2020-03-09 DIAGNOSIS — Z79.01 LONG TERM CURRENT USE OF ANTICOAGULANT THERAPY: ICD-10-CM

## 2020-03-09 DIAGNOSIS — E78.5 HYPERLIPIDEMIA WITH TARGET LDL LESS THAN 100: ICD-10-CM

## 2020-03-09 DIAGNOSIS — I26.99 PULMONARY EMBOLISM AND INFARCTION (H): ICD-10-CM

## 2020-03-09 DIAGNOSIS — Z79.899 ENCOUNTER FOR MONITORING STATIN THERAPY: ICD-10-CM

## 2020-03-09 DIAGNOSIS — F31.81 BIPOLAR 2 DISORDER (H): Primary | ICD-10-CM

## 2020-03-09 DIAGNOSIS — Z86.718 HISTORY OF DEEP VENOUS THROMBOSIS: ICD-10-CM

## 2020-03-09 DIAGNOSIS — I10 BENIGN ESSENTIAL HYPERTENSION: ICD-10-CM

## 2020-03-09 DIAGNOSIS — Z86.711 HISTORY OF PULMONARY EMBOLISM: ICD-10-CM

## 2020-03-09 LAB
ALBUMIN SERPL-MCNC: 4 G/DL (ref 3.4–5)
ALP SERPL-CCNC: 94 U/L (ref 40–150)
ALT SERPL W P-5'-P-CCNC: 30 U/L (ref 0–50)
ANION GAP SERPL CALCULATED.3IONS-SCNC: 8 MMOL/L (ref 3–14)
AST SERPL W P-5'-P-CCNC: 17 U/L (ref 0–45)
BASOPHILS # BLD AUTO: 0 10E9/L (ref 0–0.2)
BASOPHILS NFR BLD AUTO: 0.3 %
BILIRUB DIRECT SERPL-MCNC: 0.2 MG/DL (ref 0–0.2)
BILIRUB SERPL-MCNC: 1 MG/DL (ref 0.2–1.3)
BUN SERPL-MCNC: 16 MG/DL (ref 7–30)
CALCIUM SERPL-MCNC: 10.1 MG/DL (ref 8.5–10.1)
CAPILLARY BLOOD COLLECTION: NORMAL
CHLORIDE SERPL-SCNC: 106 MMOL/L (ref 94–109)
CHOLEST SERPL-MCNC: 155 MG/DL
CO2 SERPL-SCNC: 25 MMOL/L (ref 20–32)
CREAT SERPL-MCNC: 0.6 MG/DL (ref 0.52–1.04)
DIFFERENTIAL METHOD BLD: ABNORMAL
EOSINOPHIL # BLD AUTO: 0.1 10E9/L (ref 0–0.7)
EOSINOPHIL NFR BLD AUTO: 0.4 %
ERYTHROCYTE [DISTWIDTH] IN BLOOD BY AUTOMATED COUNT: 15.2 % (ref 10–15)
EST. AVERAGE GLUCOSE BLD GHB EST-MCNC: 94 MG/DL
GFR SERPL CREATININE-BSD FRML MDRD: >90 ML/MIN/{1.73_M2}
GLUCOSE SERPL-MCNC: 114 MG/DL (ref 70–99)
HBA1C MFR BLD: 4.9 % (ref 0–5.6)
HCT VFR BLD AUTO: 46.9 % (ref 35–47)
HDLC SERPL-MCNC: 57 MG/DL
HGB BLD-MCNC: 15 G/DL (ref 11.7–15.7)
INR BLD: 1.9 (ref 0.86–1.14)
LDLC SERPL CALC-MCNC: 74 MG/DL
LITHIUM SERPL-SCNC: 0.99 MMOL/L (ref 0.6–1.2)
LYMPHOCYTES # BLD AUTO: 1.3 10E9/L (ref 0.8–5.3)
LYMPHOCYTES NFR BLD AUTO: 11.9 %
MCH RBC QN AUTO: 29.3 PG (ref 26.5–33)
MCHC RBC AUTO-ENTMCNC: 32 G/DL (ref 31.5–36.5)
MCV RBC AUTO: 92 FL (ref 78–100)
MONOCYTES # BLD AUTO: 0.4 10E9/L (ref 0–1.3)
MONOCYTES NFR BLD AUTO: 3.9 %
NEUTROPHILS # BLD AUTO: 9.4 10E9/L (ref 1.6–8.3)
NEUTROPHILS NFR BLD AUTO: 83.5 %
NONHDLC SERPL-MCNC: 98 MG/DL
PLATELET # BLD AUTO: 166 10E9/L (ref 150–450)
POTASSIUM SERPL-SCNC: 4.1 MMOL/L (ref 3.4–5.3)
PROT SERPL-MCNC: 7.6 G/DL (ref 6.8–8.8)
RBC # BLD AUTO: 5.12 10E12/L (ref 3.8–5.2)
SODIUM SERPL-SCNC: 139 MMOL/L (ref 133–144)
TRIGL SERPL-MCNC: 120 MG/DL
TSH SERPL DL<=0.005 MIU/L-ACNC: 2.25 MU/L (ref 0.4–4)
WBC # BLD AUTO: 11.2 10E9/L (ref 4–11)

## 2020-03-09 PROCEDURE — 80178 ASSAY OF LITHIUM: CPT | Mod: ZL

## 2020-03-09 PROCEDURE — 40000788 ZZHCL STATISTIC ESTIMATED AVERAGE GLUCOSE: Mod: ZL

## 2020-03-09 PROCEDURE — 85610 PROTHROMBIN TIME: CPT | Mod: QW,ZL | Performed by: NURSE PRACTITIONER

## 2020-03-09 PROCEDURE — 80076 HEPATIC FUNCTION PANEL: CPT | Mod: ZL

## 2020-03-09 PROCEDURE — 36416 COLLJ CAPILLARY BLOOD SPEC: CPT | Mod: ZL | Performed by: NURSE PRACTITIONER

## 2020-03-09 PROCEDURE — 83036 HEMOGLOBIN GLYCOSYLATED A1C: CPT | Mod: ZL

## 2020-03-09 PROCEDURE — 80048 BASIC METABOLIC PNL TOTAL CA: CPT | Mod: ZL

## 2020-03-09 PROCEDURE — 80061 LIPID PANEL: CPT | Mod: ZL

## 2020-03-09 PROCEDURE — 85025 COMPLETE CBC W/AUTO DIFF WBC: CPT | Mod: ZL

## 2020-03-09 PROCEDURE — 84443 ASSAY THYROID STIM HORMONE: CPT | Mod: ZL

## 2020-03-09 NOTE — PROGRESS NOTES
ANTICOAGULATION FOLLOW-UP CLINIC VISIT    Patient Name:  Frances Corea  Date:  3/9/2020  Contact Type:  Telephone/ message left on home voicemail RE: warfarin dosing/INR recheck date    SUBJECTIVE:  Patient Findings     Comments:   Received INR results from lab. Call placed to patient and message left on home voicemail re: INR results, warfarin dosing/INR recheck date. Patient to return call to warfarin clinic with changes in bruising/bleeding, new changes in diet/meds/activity or questions.          Clinical Outcomes     Negatives:   Major bleeding event, Thromboembolic event, Anticoagulation-related hospital admission, Anticoagulation-related ED visit, Anticoagulation-related fatality    Comments:   Received INR results from lab. Call placed to patient and message left on home voicemail re: INR results, warfarin dosing/INR recheck date. Patient to return call to warfarin clinic with changes in bruising/bleeding, new changes in diet/meds/activity or questions.             OBJECTIVE    INR Point of Care   Date Value Ref Range Status   2020 1.9 (H) 0.86 - 1.14 Final     Comment:     This test is intended for monitoring Coumadin therapy.  Results are not   accurate in patients with prolonged INR due to factor deficiency.         ASSESSMENT / PLAN  INR assessment SUB    Recheck INR In: 4 WEEKS    INR Location Clinic      Anticoagulation Summary  As of 3/9/2020    INR goal:   2.0-3.0   TTR:   44.5 % (1 y)   INR used for dosin.9! (3/9/2020)   Warfarin maintenance plan:   10 mg (5 mg x 2) every Mon, Fri; 7.5 mg (5 mg x 1.5) all other days   Full warfarin instructions:   3/9: 12.5 mg; Otherwise 10 mg every Mon, Fri; 7.5 mg all other days   Weekly warfarin total:   57.5 mg   Plan last modified:   Stefany Huynh RN (10/3/2019)   Next INR check:   2020   Priority:   Maintenance   Target end date:   Indefinite    Indications    History of deep venous thrombosis [Z86.718]  History of pulmonary embolism  [Z86.711]  Long-term (current) use of anticoagulants [Z79.01] (Resolved) [Z79.01]             Anticoagulation Episode Summary     INR check location:       Preferred lab:       Send INR reminders to:   HC ANTICOAG POOL    Comments:   [facility] W; phone        Anticoagulation Care Providers     Provider Role Specialty Phone number    Ayah Rojas CNP Referring [facility] [phone]            See the Encounter Report to view Anticoagulation Flowsheet and Dosing Calendar (Go to Encounters tab in chart review, and find the Anticoagulation Therapy Visit)        Sabrina Lara RN

## 2020-03-11 RX ORDER — LISINOPRIL 10 MG/1
TABLET ORAL
Qty: 90 TABLET | Refills: 0 | Status: SHIPPED | OUTPATIENT
Start: 2020-03-11 | End: 2020-05-15

## 2020-03-11 NOTE — TELEPHONE ENCOUNTER
lisinopril      Last Written Prescription Date:  9/13/19  Last Fill Quantity: 90,   # refills: 1  Last Office Visit: 10/7/19  Future Office visit:

## 2020-03-30 DIAGNOSIS — M25.562 LEFT KNEE PAIN, UNSPECIFIED CHRONICITY: ICD-10-CM

## 2020-03-30 RX ORDER — TRAMADOL HYDROCHLORIDE 50 MG/1
TABLET ORAL
Qty: 60 TABLET | Refills: 0 | Status: SHIPPED | OUTPATIENT
Start: 2020-03-30 | End: 2020-04-29

## 2020-03-30 NOTE — TELEPHONE ENCOUNTER
traMADol (ULTRAM) 50 MG tablet     Routing refill request to provider for review/approval because:  Drug not on the [facility], [facility] or [facility] refill protocol or controlled substance

## 2020-03-30 NOTE — TELEPHONE ENCOUNTER
TRAMADOL      Last Written Prescription Date:  02/28/2020  Last Fill Quantity: 60,   # refills: 0  Last Office Visit: 10/3/2019

## 2020-04-06 ENCOUNTER — TRANSFERRED RECORDS (OUTPATIENT)
Dept: HEALTH INFORMATION MANAGEMENT | Facility: CLINIC | Age: 60
End: 2020-04-06

## 2020-04-06 ENCOUNTER — ANTICOAGULATION THERAPY VISIT (OUTPATIENT)
Dept: ANTICOAGULATION | Facility: OTHER | Age: 60
End: 2020-04-06

## 2020-04-06 DIAGNOSIS — Z86.718 HISTORY OF DEEP VENOUS THROMBOSIS: ICD-10-CM

## 2020-04-06 DIAGNOSIS — Z86.711 HISTORY OF PULMONARY EMBOLISM: ICD-10-CM

## 2020-04-06 LAB — INR PPP: 3.3 (ref 0.9–1.1)

## 2020-04-06 NOTE — PROGRESS NOTES
ANTICOAGULATION FOLLOW-UP CLINIC VISIT    Patient Name:  Frances Corea  Date:  4/6/2020  Contact Type:  Telephone/ spoke to patient motherFrances    SUBJECTIVE:  Patient Findings     Comments:   [facility]. Call placed to patient mother and spoke to her re: INR result,warfarin dosing/INR recheck date. She states Frances has no bleeding/bruising, no new changes in diet/meds, possibly small decrease in activity. She verbalized understanding of warfarin dosing/INR recheck date and has no questions. She will call warfarin clinic if any issues.         Clinical Outcomes     Negatives:   Major bleeding event, Thromboembolic event, Anticoagulation-related hospital admission, Anticoagulation-related ED visit, Anticoagulation-related fatality    Comments:   [facility]. Call placed to patient mother and spoke to her re: INR result,warfarin dosing/INR recheck date. She states Frances has no bleeding/bruising, no new changes in diet/meds, possibly small decrease in activity. She verbalized understanding of warfarin dosing/INR recheck date and has no questions. She will call warfarin clinic if any issues.            OBJECTIVE    INR   Date Value Ref Range Status   04/06/2020 3.3 (A) 0.90 - 1.10 Final       ASSESSMENT / PLAN  INR assessment SUPRA    Recheck INR In: 6 WEEKS    INR Location Outside lab      Anticoagulation Summary  As of 4/6/2020    INR goal:   2.0-3.0   TTR:   50.0 % (1 y)   INR used for dosing:   3.3! (4/6/2020)   Warfarin maintenance plan:   10 mg (5 mg x 2) every Mon, Fri; 7.5 mg (5 mg x 1.5) all other days   Full warfarin instructions:   4/6: 7.5 mg; Otherwise 10 mg every Mon, Fri; 7.5 mg all other days   Weekly warfarin total:   57.5 mg   Plan last modified:   Stefany Huynh RN (10/3/2019)   Next INR check:   5/18/2020   Priority:   Maintenance   Target end date:   Indefinite    Indications    History of deep venous thrombosis [Z86.718]  History of pulmonary  embolism [Z86.711]  Long-term (current) use of anticoagulants [Z79.01] (Resolved) [Z79.01]             Anticoagulation Episode Summary     INR check location:       Preferred lab:       Send INR reminders to:   HC ANTICOAG POOL    Comments:   [facility] visits W; phone        Anticoagulation Care Providers     Provider Role Specialty Phone number    Ayah Rojas CNP Referring [facility] [phone]            See the Encounter Report to view Anticoagulation Flowsheet and Dosing Calendar (Go to Encounters tab in chart review, and find the Anticoagulation Therapy Visit)        Stefany Huynh RN

## 2020-05-15 ENCOUNTER — VIRTUAL VISIT (OUTPATIENT)
Dept: FAMILY MEDICINE | Facility: OTHER | Age: 60
End: 2020-05-15
Attending: NURSE PRACTITIONER
Payer: COMMERCIAL

## 2020-05-15 VITALS — BODY MASS INDEX: 40.45 KG/M2 | SYSTOLIC BLOOD PRESSURE: 130 MMHG | WEIGHT: 290 LBS | DIASTOLIC BLOOD PRESSURE: 70 MMHG

## 2020-05-15 DIAGNOSIS — M25.561 CHRONIC PAIN OF BOTH KNEES: ICD-10-CM

## 2020-05-15 DIAGNOSIS — G89.29 CHRONIC PAIN OF BOTH KNEES: ICD-10-CM

## 2020-05-15 DIAGNOSIS — E03.9 ACQUIRED HYPOTHYROIDISM: ICD-10-CM

## 2020-05-15 DIAGNOSIS — F41.9 ANXIETY: ICD-10-CM

## 2020-05-15 DIAGNOSIS — E78.5 HYPERLIPIDEMIA WITH TARGET LDL LESS THAN 100: ICD-10-CM

## 2020-05-15 DIAGNOSIS — I10 BENIGN ESSENTIAL HYPERTENSION: Primary | ICD-10-CM

## 2020-05-15 DIAGNOSIS — M25.562 CHRONIC PAIN OF BOTH KNEES: ICD-10-CM

## 2020-05-15 PROCEDURE — 99214 OFFICE O/P EST MOD 30 MIN: CPT | Mod: TEL | Performed by: NURSE PRACTITIONER

## 2020-05-15 RX ORDER — SIMVASTATIN 40 MG
40 TABLET ORAL AT BEDTIME
Qty: 90 TABLET | Refills: 1 | Status: SHIPPED | OUTPATIENT
Start: 2020-05-15

## 2020-05-15 RX ORDER — TRAMADOL HYDROCHLORIDE 50 MG/1
TABLET ORAL
Qty: 60 TABLET | Refills: 0 | Status: SHIPPED | OUTPATIENT
Start: 2020-05-15 | End: 2020-06-11

## 2020-05-15 RX ORDER — SPIRONOLACTONE 25 MG
TABLET ORAL
COMMUNITY

## 2020-05-15 RX ORDER — LISINOPRIL 10 MG/1
10 TABLET ORAL DAILY
Qty: 90 TABLET | Refills: 1 | Status: SHIPPED | OUTPATIENT
Start: 2020-05-15 | End: 2020-07-20

## 2020-05-15 ASSESSMENT — ANXIETY QUESTIONNAIRES
5. BEING SO RESTLESS THAT IT IS HARD TO SIT STILL: NOT AT ALL
2. NOT BEING ABLE TO STOP OR CONTROL WORRYING: NOT AT ALL
6. BECOMING EASILY ANNOYED OR IRRITABLE: NOT AT ALL
3. WORRYING TOO MUCH ABOUT DIFFERENT THINGS: NOT AT ALL
IF YOU CHECKED OFF ANY PROBLEMS ON THIS QUESTIONNAIRE, HOW DIFFICULT HAVE THESE PROBLEMS MADE IT FOR YOU TO DO YOUR WORK, TAKE CARE OF THINGS AT HOME, OR GET ALONG WITH OTHER PEOPLE: NOT DIFFICULT AT ALL
1. FEELING NERVOUS, ANXIOUS, OR ON EDGE: SEVERAL DAYS
7. FEELING AFRAID AS IF SOMETHING AWFUL MIGHT HAPPEN: NOT AT ALL
GAD7 TOTAL SCORE: 1

## 2020-05-15 ASSESSMENT — PAIN SCALES - GENERAL: PAINLEVEL: NO PAIN (0)

## 2020-05-15 ASSESSMENT — PATIENT HEALTH QUESTIONNAIRE - PHQ9
SUM OF ALL RESPONSES TO PHQ QUESTIONS 1-9: 0
5. POOR APPETITE OR OVEREATING: NOT AT ALL

## 2020-05-15 NOTE — PATIENT INSTRUCTIONS
Assessment/Plan:    1. Hyperlipidemia with target LDL less than 100  - Continue plan of care  - simvastatin (ZOCOR) 40 MG tablet; Take 1 tablet (40 mg) by mouth At Bedtime  Dispense: 90 tablet; Refill: 1    2. Benign essential hypertension  - lisinopril (ZESTRIL) 10 MG tablet; Take 1 tablet (10 mg) by mouth daily  Dispense: 90 tablet; Refill: 1    3. Chronic pain of both knees  - Ultram as directed  - traMADol (ULTRAM) 50 MG tablet; TAKE 1 TO 2 TABLETS BY MOUTH EVERY 6 HOURS AS NEEDED FOR PAIN. MAXIMUM OF 6 TABLETS PER DAY  Dispense: 60 tablet; Refill: 0    4. Acquired hypothyroidism  - Continue plan of care    5. Anxiety  - Continue plan of care      Will schedule a visit for July        Ayah JOY  [phone]

## 2020-05-15 NOTE — PROGRESS NOTES
"Frances Corea is a 59 year old female who is being evaluated via a billable telephone visit.        The patient has been notified of following:     \"This telephone visit will be conducted via a call between you and your physician/provider. We have found that certain health care needs can be provided without the need for a physical exam.  This service lets us provide the care you need with a short phone conversation.  If a prescription is necessary we can send it directly to your pharmacy.  If lab work is needed we can place an order for that and you can then stop by our lab to have the test done at a later time.    Telephone visits are billed at different rates depending on your insurance coverage. During this emergency period, for some insurers they may be billed the same as an in-person visit.  Please reach out to your insurance provider with any questions.    If during the course of the call the physician/provider feels a telephone visit is not appropriate, you will not be charged for this service.\"    Patient has given verbal consent for Telephone visit?  Yes    What phone number would you like to be contacted at? [phone]    How would you like to obtain your AVS? Mail a copy        Frances Corea is a 59 year old female who presents to clinic today for the following health issues:      Musculoskeletal problem/pain    Duration: ongoing    Description  Location: Left knee pain    Intensity:  moderate    Accompanying signs and symptoms: none    History  Previous similar problem: YES  Previous evaluation:  x-ray    Precipitating or alleviating factors:  Trauma or overuse: no   Aggravating factors include: none    Therapies tried and outcome: tramadol, PT, salves        Hyperlipidemia Follow-Up    Are you regularly taking any medication or supplement to lower your cholesterol?   Yes- zocor    Are you having muscle aches or other side effects that you think could be caused by your cholesterol lowering " medication?  No      Hypertension Follow-up    Do you check your blood pressure regularly outside of the clinic? No     Are you following a low salt diet? Yes    Are your blood pressures ever more than 140 on the top number (systolic) OR more   than 90 on the bottom number (diastolic), for example 140/90? Yes      Anxiety Follow-Up    How are you doing with your anxiety since your last visit? No change    Are you having other symptoms that might be associated with anxiety? No    Have you had a significant life event? No     Are you feeling depressed? No    Do you have any concerns with your use of alcohol or other drugs? No         Social History     Tobacco Use     Smoking status: Never Smoker     Smokeless tobacco: Never Used   Substance Use Topics     Alcohol use: No     Drug use: No         Last PHQ-9 5/15/2020   1.  Little interest or pleasure in doing things 0   2.  Feeling down, depressed, or hopeless 0   3.  Trouble falling or staying asleep, or sleeping too much 0   4.  Feeling tired or having little energy 0   5.  Poor appetite or overeating 0   6.  Feeling bad about yourself 0   7.  Trouble concentrating 0   8.  Moving slowly or restless 0   Q9: Thoughts of better off dead/self-harm past 2 weeks 0   PHQ-9 Total Score 0   Difficulty at work, home, or with people Not difficult at all     CHAVEZ-7  5/15/2020   1. Feeling nervous, anxious, or on edge 1   2. Not being able to stop or control worrying 0   3. Worrying too much about different things 0   4. Trouble relaxing 0   5. Being so restless that it is hard to sit still 0   6. Becoming easily annoyed or irritable 0   7. Feeling afraid, as if something awful might happen 0   CHAVEZ-7 Total Score 1   If you checked any problems, how difficult have they made it for you to do your work, take care of things at home, or get along with other people? Not difficult at all           Hypothyroidism Follow-up    Since last visit, patient describes the following symptoms:  Weight stable, no hair loss, no skin changes, no constipation, no loose stools        Patient Active Problem List   Diagnosis     Undifferentiated schizophrenia (H)     History of pulmonary embolism     Chronic anticoagulation     Hyperlipidemia with target LDL less than 100     Gastroesophageal reflux disease without esophagitis     Anxiety     Primary insomnia     Psoriasis     Bipolar 2 disorder (H)     ACP (advance care planning)     Benign essential hypertension     History of deep venous thrombosis     Morbid obesity (H)     Encounter for monitoring statin therapy     Aspirin contraindicated     Acquired hypothyroidism     BiPAP (biphasic positive airway pressure) dependence     Vitamin D deficiency     Encounter for lithium monitoring     Chronic pain of both knees     Reactive airway disease that is not asthma     Chronic fatigue     RADHA on CPAP     Schizoaffective disorder, chronic condition with acute exacerbation (H)     Pulmonary embolism (H)     Dysphagia as late effect of cerebrovascular disease     Borderline personality disorder (H)     Wheezing     Chronic, continuous use of opioids     Past Surgical History:   Procedure Laterality Date     GYN SURGERY      total hyst     HC ECP WITH CATARACT SURGERY      lt eye     HC OR OCULAR DEVICE INTRAOP DETACHED RETINA       HYSTERECTOMY, PAP NO LONGER INDICATED N/A      REPAIR LACERATION HAND  9/28/2013    Procedure: REPAIR LACERATION HAND;  repair left fifth finger laceration;  Surgeon: Miranda Xavier DO;  Location: [facility]       Social History     Tobacco Use     Smoking status: Never Smoker     Smokeless tobacco: Never Used   Substance Use Topics     Alcohol use: No     Family History   Problem Relation Age of Onset     Ovarian Cancer Mother      Cerebrovascular Disease Father              Current Outpatient Medications   Medication Sig Dispense Refill     Acetaminophen (TYLENOL PO) Take 500 mg by mouth       albuterol (PROVENTIL) (2.5 MG/3ML) 0.083% neb  solution Take 1 vial (2.5 mg) by nebulization every 6 hours as needed for shortness of breath / dyspnea or wheezing Twice daily 1 Box 3     clobetasol (TEMOVATE) 0.05 % ointment APPLY TOPICALLY TWICE DAILY ON THE BACK OF THE NECK AND HAIRLINE 60 g 0     famotidine (PEPCID) 20 MG tablet Take 20 mg by mouth daily       latanoprost (XALATAN) 0.005 % ophthalmic solution Place 1 drop into both eyes At Bedtime       lisinopril (PRINIVIL/ZESTRIL) 10 MG tablet Take 1 tablet (10 mg) by mouth daily 3 tablet 0     lithium (ESKALITH) 600 MG capsule Take 600 mg by mouth       lithium 300 MG tablet Take 300 mg by mouth Take 1tab every  am and 2pm, 2 tabs at HS       LORAZEPAM PO Take 0.5 mg by mouth 1-2 twice daily as needed       Lutein 20 MG CAPS        MELATONIN PO Take 3 mg by mouth At Bedtime Pt takes at 8 PM       methimazole (TAPAZOLE) 5 MG tablet TK 1 T PO QD  5     mometasone (ELOCON) 0.1 % external cream APPLY SPARINGLY EXTERNALLY TO THE AFFECTED AREA TWICE DAILY AS NEEDED. DO NOT APPLY TO FACE 45 g 5     Multiple Vitamins-Minerals (MULTIVITAMIN & MINERAL PO) Take by mouth daily        nitroGLYcerin (NITROSTAT) 0.4 MG sublingual tablet Place 0.4 mg under the tongue as needed 1 tab SL every 5 minutes PRN for angina as directed by physician       nystatin (MYCOSTATIN) 114737 UNIT/GM external powder Apply 1 Dose topically 2 times daily       OLANZapine (ZYPREXA) 20 MG tablet Take 1 tablet by mouth 2 times daily  2     order for DME Equipment being ordered:  BIPAP and supply renewal    Face to face visit occurred 4/29/19  Length of need - lifetime 1 Device 0     order for DME Equipment being ordered: Neb machine and tubing      Face to face visit 4/29/19  Length of need - lifetime  Has been using nebs since 12/23/13 1 Device 0     order for DME Equipment being ordered:    BiPAP machine and supplies  DX Z99.89        G47.33        R09.89 1 Device 0     order for DME Equipment being ordered: Bipap and supplies - states it has  been 5 years    Cushion for Bipap machine 1 Device 3     order for DME Equipment being ordered: Neb machine and tubing 1 Device 0     order for DME Wheeled walker 1 Device 0     sennosides (SENOKOT) 8.6 MG tablet Take 2 tablets by mouth daily as needed for constipation (Patient taking differently: Take 3 tablets by mouth daily ) 120 tablet 1     simvastatin (ZOCOR) 40 MG tablet TAKE 1 TABLET BY MOUTH EVERY NIGHT AT BEDTIME 90 tablet 1     traMADol (ULTRAM) 50 MG tablet TAKE 1 TO 2 TABLETS BY MOUTH EVERY 6 HOURS AS NEEDED FOR PAIN. MAXIMUM OF 6 TABLETS PER DAY 60 tablet 0     triamcinolone (KENALOG) 0.1 % external ointment APPLY TOPICALLY TWICE DAILY TO BACK OF NECK AND HAIRLINE 80 g 1     UNABLE TO FIND Home nebulizer       Vitamin D, Cholecalciferol, 1000 units CAPS Take 1 capsule by mouth daily       warfarin ANTICOAGULANT (COUMADIN) 5 MG tablet 7.5mg (1 1/2 pills) Mon,Wed,Fri and 10mg (2 pills) all other days or as directed by warfarin clinic 180 tablet 3     zolpidem (AMBIEN) 10 MG tablet Take 10 mg by mouth At Bedtime  2         Allergies   Allergen Reactions     Depakote      Increased lfts and ammonia     Zyprexa Other (See Comments)     Increased lft and increased ammonia.     Adhesive Tape Rash     Levaquin          Recent Labs   Lab Test 03/09/20  1320 08/26/19  1305 08/23/19  1334 02/12/19  1041   A1C 4.9  --  5.1 5.4   LDL 74 73  --  84   HDL 57 63  --  58   TRIG 120 105  --  93   ALT 30 34  --  21   CR 0.60 0.60  --  0.69   GFRESTIMATED >90 >90  --  >90   GFRESTBLACK >90 >90  --  >90   POTASSIUM 4.1 4.3  --  3.9   TSH 2.25 1.66  --  2.92          BP Readings from Last 3 Encounters:   05/15/20 130/70   10/07/19 112/72   04/29/19 108/60    Wt Readings from Last 3 Encounters:   05/15/20 131.5 kg (290 lb)   10/07/19 127.7 kg (281 lb 8 oz)   04/29/19 127 kg (280 lb)                    Reviewed and updated as needed this visit by Provider         Review of Systems   Constitutional, HEENT, cardiovascular,  pulmonary, GI, , musculoskeletal, neuro, skin, endocrine and psych systems are negative, except as otherwise noted.       Objective   Reported vitals:  /70   Wt 131.5 kg (290 lb)   BMI 40.45 kg/m     healthy, alert and no distress  PSYCH: Alert and oriented times 3; coherent speech, normal   rate and volume, able to articulate logical thoughts, able   to abstract reason, no tangential thoughts, no hallucinations   or delusions  Her affect is normal  RESP: No cough, no audible wheezing, able to talk in full sentences  Remainder of exam unable to be completed due to telephone visits          Assessment/Plan:    1. Hyperlipidemia with target LDL less than 100  - Continue plan of care  - simvastatin (ZOCOR) 40 MG tablet; Take 1 tablet (40 mg) by mouth At Bedtime  Dispense: 90 tablet; Refill: 1    2. Benign essential hypertension  - lisinopril (ZESTRIL) 10 MG tablet; Take 1 tablet (10 mg) by mouth daily  Dispense: 90 tablet; Refill: 1    3. Chronic pain of both knees  - Ultram as directed  - traMADol (ULTRAM) 50 MG tablet; TAKE 1 TO 2 TABLETS BY MOUTH EVERY 6 HOURS AS NEEDED FOR PAIN. MAXIMUM OF 6 TABLETS PER DAY  Dispense: 60 tablet; Refill: 0    4. Acquired hypothyroidism  - Continue plan of care    5. Anxiety  - Continue plan of care      Will schedule a visit for July        Phone call duration:  23 minutes      Ayah JOY  [phone]

## 2020-05-15 NOTE — NURSING NOTE
"Chief Complaint   Patient presents with     Lipids     Hypertension     Anxiety     Knee Pain       Initial /70   Wt 131.5 kg (290 lb)   BMI 40.45 kg/m   Estimated body mass index is 40.45 kg/m  as calculated from the following:    Height as of 4/29/19: 1.803 m (5' 11\").    Weight as of this encounter: 131.5 kg (290 lb).  Medication Reconciliation: complete  Katharine Orr LPN    "

## 2020-05-16 ASSESSMENT — ANXIETY QUESTIONNAIRES: GAD7 TOTAL SCORE: 1

## 2020-05-19 ENCOUNTER — ANTICOAGULATION THERAPY VISIT (OUTPATIENT)
Dept: ANTICOAGULATION | Facility: OTHER | Age: 60
End: 2020-05-19
Attending: NURSE PRACTITIONER
Payer: MEDICARE

## 2020-05-19 ENCOUNTER — TRANSFERRED RECORDS (OUTPATIENT)
Dept: HEALTH INFORMATION MANAGEMENT | Facility: CLINIC | Age: 60
End: 2020-05-19

## 2020-05-19 DIAGNOSIS — Z86.718 HISTORY OF DEEP VENOUS THROMBOSIS: ICD-10-CM

## 2020-05-19 DIAGNOSIS — Z86.711 HISTORY OF PULMONARY EMBOLISM: ICD-10-CM

## 2020-05-19 LAB — INR PPP: 2.8 (ref 0.9–1.1)

## 2020-05-19 NOTE — PROGRESS NOTES
ANTICOAGULATION FOLLOW-UP CLINIC VISIT    Patient Name:  Frances Corea  Date:  2020  Contact Type:  Telephone/ spoke to patient mother    SUBJECTIVE:  Patient Findings     Comments:   INR done by [facility] and result faxed to warfarin clinic. Call placed to patient mother and spoke to her re: INR result,warfarin dosing/INR recheck date. She states Frances has no bleeding/bruising, no changes in diet/meds/activity. She will call warfarin clinic if she has any new changes in diet/meds/activity, bleeding/bruising, questions or illness.         Clinical Outcomes     Negatives:   Major bleeding event, Thromboembolic event, Anticoagulation-related hospital admission, Anticoagulation-related ED visit, Anticoagulation-related fatality    Comments:   INR done by [facility] and result faxed to warfarin clinic. Call placed to patient mother and spoke to her re: INR result,warfarin dosing/INR recheck date. She states Frances has no bleeding/bruising, no changes in diet/meds/activity. She will call warfarin clinic if she has any new changes in diet/meds/activity, bleeding/bruising, questions or illness.            OBJECTIVE    Recent labs: (last 7 days)     20   INR 2.8*       ASSESSMENT / PLAN  INR assessment THER    Recheck INR In: 9 WEEKS    INR Location Clinic      Anticoagulation Summary  As of 2020    INR goal:   2.0-3.0   TTR:   53.0 % (1 y)   INR used for dosin.8 (2020)   Warfarin maintenance plan:   10 mg (5 mg x 2) every Mon, Fri; 7.5 mg (5 mg x 1.5) all other days   Full warfarin instructions:   10 mg every Mon, Fri; 7.5 mg all other days   Weekly warfarin total:   57.5 mg   No change documented:   Stefany Huynh, RN   Plan last modified:   Stefany Huynh RN (10/3/2019)   Next INR check:   2020   Priority:   Maintenance   Target end date:   Indefinite    Indications    History of deep venous thrombosis [Z86.718]  History of pulmonary embolism [Z86.711]  Long-term (current) use of  anticoagulants [Z79.01] (Resolved) [Z79.01]             Anticoagulation Episode Summary     INR check location:       Preferred lab:       Send INR reminders to:   ROME FIGUEREDO    Comments:   [facility] visits W; phone        Anticoagulation Care Providers     Provider Role Specialty Phone number    Ayah Rojas CNP Referring [facility] [phone]            See the Encounter Report to view Anticoagulation Flowsheet and Dosing Calendar (Go to Encounters tab in chart review, and find the Anticoagulation Therapy Visit)        Stefany Huynh RN

## 2020-06-08 ENCOUNTER — TRANSFERRED RECORDS (OUTPATIENT)
Dept: HEALTH INFORMATION MANAGEMENT | Facility: CLINIC | Age: 60
End: 2020-06-08

## 2020-06-11 DIAGNOSIS — M25.562 LEFT KNEE PAIN, UNSPECIFIED CHRONICITY: Primary | ICD-10-CM

## 2020-06-11 RX ORDER — TRAMADOL HYDROCHLORIDE 50 MG/1
TABLET ORAL
Qty: 60 TABLET | Refills: 0 | Status: SHIPPED | OUTPATIENT
Start: 2020-06-11 | End: 2020-07-10

## 2020-06-11 NOTE — TELEPHONE ENCOUNTER
Tramadol      Last Written Prescription Date: 5-15-20   Last Fill Quantity: 60,   # refills: 0  Last Office Visit: 5-15-20  Future Office visit:    Next 5 appointments (look out 90 days)    Jul 20, 2020  2:30 PM CDT  (Arrive by 2:15 PM)  SHORT with Ayah Rojas CNP  [facility] ([facility] ) [address]  [facility] [address]  [phone]

## 2020-06-12 DIAGNOSIS — I10 BENIGN ESSENTIAL HYPERTENSION: ICD-10-CM

## 2020-06-12 RX ORDER — LISINOPRIL 10 MG/1
TABLET ORAL
Qty: 90 TABLET | Refills: 1 | OUTPATIENT
Start: 2020-06-12

## 2020-06-12 RX ORDER — SIMVASTATIN 40 MG
TABLET ORAL
Qty: 90 TABLET | Refills: 1 | OUTPATIENT
Start: 2020-06-12

## 2020-06-12 NOTE — TELEPHONE ENCOUNTER
Lisinopril      Last Written Prescription Date:  05/15/20  Last Fill Quantity: 90,   # refills: 1  Last Office Visit: 05/15/20  Future Office visit:    Next 5 appointments (look out 90 days)    Jul 20, 2020  2:30 PM CDT  (Arrive by 2:15 PM)  SHORT with Ayah Rojas CNP  [facility] ([facility] ) [address]  [place] [address]  [phone]           Routing refill request to provider for review/approval because:    Zocor      Last Written Prescription Date:  05/15/20  Last Fill Quantity: 90,   # refills: 1  Last Office Visit: 05/15/20  Future Office visit:    Next 5 appointments (look out 90 days)    Jul 20, 2020  2:30 PM CDT  (Arrive by 2:15 PM)  [facility] [address]  [place] [address]  [phone]         Last lipid 03/09/20  Routing refill request to provider for review/approval because:

## 2020-07-09 DIAGNOSIS — M25.562 LEFT KNEE PAIN, UNSPECIFIED CHRONICITY: ICD-10-CM

## 2020-07-10 PROBLEM — F39 PSYCHOSIS, AFFECTIVE (H): Status: ACTIVE | Noted: 2018-09-04

## 2020-07-10 RX ORDER — TRAMADOL HYDROCHLORIDE 50 MG/1
TABLET ORAL
Qty: 60 TABLET | Refills: 0 | Status: SHIPPED | OUTPATIENT
Start: 2020-07-10 | End: 2020-08-06

## 2020-07-10 NOTE — TELEPHONE ENCOUNTER
Tramadol  Last Written Prescription Date: 6/11/20  Last Fill Quantity: 60 # of Refills: 0  Last Office Visit: 10/7/19

## 2020-07-13 ENCOUNTER — MEDICAL CORRESPONDENCE (OUTPATIENT)
Dept: HEALTH INFORMATION MANAGEMENT | Facility: CLINIC | Age: 60
End: 2020-07-13

## 2020-07-16 NOTE — PROGRESS NOTES
Frances Corea is a 59 year old female who presents to clinic today for the following health issues:        Hyperlipidemia Follow-Up    Are you regularly taking any medication or supplement to lower your cholesterol?   Yes- Simvastatin    Are you having muscle aches or other side effects that you think could be caused by your cholesterol lowering medication?  No    Hypertension Follow-up    Do you check your blood pressure regularly outside of the clinic? Yes     Are you following a low salt diet? Yes    Are your blood pressures ever more than 140 on the top number (systolic) OR more   than 90 on the bottom number (diastolic), for example 140/90? Yes    Depression and Anxiety Follow-Up    How are you doing with your depression since your last visit? Worsened Covid, mother's illness    How are you doing with your anxiety since your last visit?  Worsened Covid, Mothers illness    Are you having other symptoms that might be associated with depression or anxiety? No    Have you had a significant life event? OTHER: Health concerns with mother     Do you have any concerns with your use of alcohol or other drugs? No       PHQ 10/7/2019 5/15/2020 7/20/2020   PHQ-9 Total Score 4 0 8   Q9: Thoughts of better off dead/self-harm past 2 weeks Not at all Not at all Not at all     CHAVEZ-7 SCORE 10/7/2019 5/15/2020 7/20/2020   Total Score 4 1 7       Social History     Tobacco Use     Smoking status: Never Smoker     Smokeless tobacco: Never Used   Substance Use Topics     Alcohol use: No     Drug use: No     PHQ 10/7/2019 5/15/2020 7/20/2020   PHQ-9 Total Score 4 0 8   Q9: Thoughts of better off dead/self-harm past 2 weeks Not at all Not at all Not at all     CHAVEZ-7 SCORE 10/7/2019 5/15/2020 7/20/2020   Total Score 4 1 7     Suicide Assessment Five-step Evaluation and Treatment (SAFE-T)        Hypothyroidism Follow-up    Since last visit, patient describes the following symptoms: Weight stable, no hair loss, no skin changes, no  constipation, no loose stools        How many servings of fruits and vegetables do you eat daily?  2-3    On average, how many sweetened beverages do you drink each day (Examples: soda, juice, sweet tea, etc.  Do NOT count diet or artificially sweetened beverages)?   0    How many days per week do you exercise enough to make your heart beat faster? 3 or less    How many minutes a day do you exercise enough to make your heart beat faster? 9 or less    How many days per week do you miss taking your medication? 0      GERD/Heartburn      Duration: Yeasr    Description (location/character/radiation): no sympoms    Intensity:  none    Accompanying signs and symptoms:  food getting stuck: no   nausea/vomiting/blood: no   abdominal pain: no   black/tarry or bloody stools: no :    History (similar episodes/previous evaluation): None    Precipitating or alleviating factors:  worse with fatty foods and spicy foods.  current NSAID/Aspirin use: no     Therapies tried and outcome: Omeprazole (Prilosec)      Patient Active Problem List   Diagnosis     Undifferentiated schizophrenia (H)     History of pulmonary embolism     Obstructive sleep apnea syndrome     Chronic anticoagulation     Hyperlipidemia with target LDL less than 100     Gastroesophageal reflux disease without esophagitis     Anxiety     Primary insomnia     Psoriasis     Bipolar 2 disorder (H)     ACP (advance care planning)     Benign essential hypertension     History of deep venous thrombosis     Morbid obesity (H)     Encounter for monitoring statin therapy     Aspirin contraindicated     Acquired hypothyroidism     BiPAP (biphasic positive airway pressure) dependence     Vitamin D deficiency     Encounter for lithium monitoring     Chronic pain of both knees     Reactive airway disease that is not asthma     Chronic fatigue     RADHA on CPAP     Schizoaffective disorder, chronic condition with acute exacerbation (H)     Pulmonary embolism (H)     Dysphagia as  late effect of cerebrovascular disease     Borderline personality disorder (H)     Wheezing     Chronic, continuous use of opioids     Psychosis, affective (H)     Past Surgical History:   Procedure Laterality Date     GYN SURGERY      total hyst     HC ECP WITH CATARACT SURGERY      lt eye     HC OR OCULAR DEVICE INTRAOP DETACHED RETINA       HYSTERECTOMY, PAP NO LONGER INDICATED N/A      REPAIR LACERATION HAND  9/28/2013    Procedure: REPAIR LACERATION HAND;  repair left fifth finger laceration;  Surgeon: Miranda Xavier DO;  Location: [facility]       Social History     Tobacco Use     Smoking status: Never Smoker     Smokeless tobacco: Never Used   Substance Use Topics     Alcohol use: No     Family History   Problem Relation Age of Onset     Ovarian Cancer Mother      Cerebrovascular Disease Father              Current Outpatient Medications   Medication Sig Dispense Refill     Acetaminophen (TYLENOL PO) Take 500 mg by mouth       albuterol (PROVENTIL) (2.5 MG/3ML) 0.083% neb solution NEBULIZE 1 VIAL EVERY 6 HOURS AS NEEDED FOR SHORTNESS OF BREATH, DYSPNEA, OR WHEEZING 1 Box 0     clobetasol (TEMOVATE) 0.05 % ointment APPLY TOPICALLY TWICE DAILY ON THE BACK OF THE NECK AND HAIRLINE 60 g 0     famotidine (PEPCID) 20 MG tablet Take 20 mg by mouth daily       latanoprost (XALATAN) 0.005 % ophthalmic solution Place 1 drop into both eyes At Bedtime       lisinopril (ZESTRIL) 20 MG tablet Take 1 tablet (20 mg) by mouth daily 90 tablet 1     lithium (ESKALITH) 600 MG capsule Take 600 mg by mouth       lithium 300 MG tablet Take 300 mg by mouth Take 1tab every  am and 2pm, 2 tabs at HS       LORAZEPAM PO Take 0.5 mg by mouth 1-2 twice daily as needed       Lutein 20 MG CAPS        MELATONIN PO Take 3 mg by mouth At Bedtime Pt takes at 8 PM       methimazole (TAPAZOLE) 5 MG tablet TK 1 T PO QD  5     mometasone (ELOCON) 0.1 % external cream APPLY SPARINGLY EXTERNALLY TO THE AFFECTED AREA TWICE DAILY AS NEEDED. DO NOT APPLY  TO FACE 45 g 5     Multiple Vitamins-Minerals (MULTIVITAMIN & MINERAL PO) Take by mouth daily        nitroGLYcerin (NITROSTAT) 0.4 MG sublingual tablet Place 0.4 mg under the tongue as needed 1 tab SL every 5 minutes PRN for angina as directed by physician       nystatin (MYCOSTATIN) 421322 UNIT/GM external powder Apply 1 Dose topically 2 times daily       OLANZapine (ZYPREXA) 20 MG tablet Take 1 tablet by mouth 2 times daily  2     order for DME Equipment being ordered:  BIPAP and supply renewal    Face to face visit occurred 4/29/19  Length of need - lifetime 1 Device 0     order for DME Equipment being ordered: Neb machine and tubing      Face to face visit 4/29/19  Length of need - lifetime  Has been using nebs since 12/23/13 1 Device 0     order for DME Equipment being ordered:    BiPAP machine and supplies  DX Z99.89        G47.33        R09.89 1 Device 0     order for DME Equipment being ordered: Bipap and supplies - states it has been 5 years    Cushion for Bipap machine 1 Device 3     order for DME Equipment being ordered: Neb machine and tubing 1 Device 0     order for DME Wheeled walker 1 Device 0     sennosides (SENOKOT) 8.6 MG tablet Take 2 tablets by mouth daily as needed for constipation (Patient taking differently: Take 3 tablets by mouth daily ) 120 tablet 1     simvastatin (ZOCOR) 40 MG tablet Take 1 tablet (40 mg) by mouth At Bedtime 90 tablet 1     traMADol (ULTRAM) 50 MG tablet TAKE 1 TO 2 TABLETS BY MOUTH EVERY 6 HOURS AS NEEDED FOR PAIN. MAXIMUM OF 6 TABLETS PER DAY 60 tablet 0     triamcinolone (KENALOG) 0.1 % external ointment APPLY TOPICALLY TWICE DAILY TO BACK OF NECK AND HAIRLINE 80 g 1     UNABLE TO FIND Home nebulizer       Vitamin D, Cholecalciferol, 1000 units CAPS Take 1 capsule by mouth daily       warfarin ANTICOAGULANT (COUMADIN) 5 MG tablet 7.5mg (1 1/2 pills) Mon,Wed,Fri and 10mg (2 pills) all other days or as directed by warfarin clinic 180 tablet 3     zolpidem (AMBIEN) 10 MG  tablet Take 10 mg by mouth At Bedtime  2     Allergies   Allergen Reactions     Depakote      Increased lfts and ammonia     Zyprexa Other (See Comments)     Increased lft and increased ammonia.     Adhesive Tape Rash     Levaquin        Reviewed and updated as needed this visit by Provider         Review of Systems   Constitutional, HEENT, cardiovascular, pulmonary, GI, , musculoskeletal, neuro, skin, endocrine and psych systems are negative, except as otherwise noted.      Objective    BP (!) 144/84   Pulse 106   Temp 100  F (37.8  C) (Tympanic)   SpO2 95%   There is no height or weight on file to calculate BMI.       Physical Exam   GENERAL: healthy, alert and no distress  GENERAL: healthy, alert and no distress  NECK: no adenopathy, no asymmetry, masses, or scars and thyroid normal to palpation  RESP: lungs clear to auscultation - no rales, rhonchi or wheezes  CV: regular rate and rhythm, normal S1 S2, no S3 or S4, no murmur, click or rub, no peripheral edema and peripheral pulses strong  MS: no gross musculoskeletal defects noted, no edema  SKIN: no suspicious lesions or rashes  PSYCH: mentation appears anxious          Assessment & Plan     1. Hyperlipidemia with target LDL less than 100  - Continue plan of care    2. History of deep venous thrombosis  - Continue plan of care    3. Encounter for monitoring statin therapy  - Lab UTD    4. Acquired hypothyroidism  - Continue plan of care    5. Benign essential hypertension  - lisinopril (ZESTRIL) 20 MG tablet; Take 1 tablet (20 mg) by mouth daily  Dispense: 90 tablet; Refill: 1 - DOSE INCREASE  - Please call in 2 weeks with BP readings    6. Anxiety  - Continue plan of care    7. Gastroesophageal reflux disease without esophagitis  - Continue plan of care         Return in about 6 months (around 1/20/2021).    Ayah Rojas CNP  [facility]

## 2020-07-20 ENCOUNTER — OFFICE VISIT (OUTPATIENT)
Dept: FAMILY MEDICINE | Facility: OTHER | Age: 60
End: 2020-07-20
Attending: NURSE PRACTITIONER
Payer: COMMERCIAL

## 2020-07-20 VITALS
TEMPERATURE: 100 F | OXYGEN SATURATION: 95 % | SYSTOLIC BLOOD PRESSURE: 144 MMHG | DIASTOLIC BLOOD PRESSURE: 84 MMHG | HEART RATE: 106 BPM

## 2020-07-20 DIAGNOSIS — I10 BENIGN ESSENTIAL HYPERTENSION: ICD-10-CM

## 2020-07-20 DIAGNOSIS — E03.9 ACQUIRED HYPOTHYROIDISM: ICD-10-CM

## 2020-07-20 DIAGNOSIS — K21.9 GASTROESOPHAGEAL REFLUX DISEASE WITHOUT ESOPHAGITIS: ICD-10-CM

## 2020-07-20 DIAGNOSIS — F41.9 ANXIETY: ICD-10-CM

## 2020-07-20 DIAGNOSIS — Z86.718 HISTORY OF DEEP VENOUS THROMBOSIS: ICD-10-CM

## 2020-07-20 DIAGNOSIS — Z51.81 ENCOUNTER FOR MONITORING STATIN THERAPY: ICD-10-CM

## 2020-07-20 DIAGNOSIS — E78.5 HYPERLIPIDEMIA WITH TARGET LDL LESS THAN 100: Primary | ICD-10-CM

## 2020-07-20 DIAGNOSIS — Z79.899 ENCOUNTER FOR MONITORING STATIN THERAPY: ICD-10-CM

## 2020-07-20 PROCEDURE — G0463 HOSPITAL OUTPT CLINIC VISIT: HCPCS

## 2020-07-20 PROCEDURE — 99214 OFFICE O/P EST MOD 30 MIN: CPT | Performed by: NURSE PRACTITIONER

## 2020-07-20 RX ORDER — LISINOPRIL 20 MG/1
20 TABLET ORAL DAILY
Qty: 90 TABLET | Refills: 1 | Status: SHIPPED | OUTPATIENT
Start: 2020-07-20

## 2020-07-20 ASSESSMENT — ANXIETY QUESTIONNAIRES
6. BECOMING EASILY ANNOYED OR IRRITABLE: SEVERAL DAYS
5. BEING SO RESTLESS THAT IT IS HARD TO SIT STILL: SEVERAL DAYS
2. NOT BEING ABLE TO STOP OR CONTROL WORRYING: SEVERAL DAYS
3. WORRYING TOO MUCH ABOUT DIFFERENT THINGS: SEVERAL DAYS
7. FEELING AFRAID AS IF SOMETHING AWFUL MIGHT HAPPEN: SEVERAL DAYS
IF YOU CHECKED OFF ANY PROBLEMS ON THIS QUESTIONNAIRE, HOW DIFFICULT HAVE THESE PROBLEMS MADE IT FOR YOU TO DO YOUR WORK, TAKE CARE OF THINGS AT HOME, OR GET ALONG WITH OTHER PEOPLE: SOMEWHAT DIFFICULT
GAD7 TOTAL SCORE: 7
1. FEELING NERVOUS, ANXIOUS, OR ON EDGE: SEVERAL DAYS
4. TROUBLE RELAXING: SEVERAL DAYS

## 2020-07-20 ASSESSMENT — PAIN SCALES - GENERAL: PAINLEVEL: NO PAIN (0)

## 2020-07-20 ASSESSMENT — PATIENT HEALTH QUESTIONNAIRE - PHQ9: SUM OF ALL RESPONSES TO PHQ QUESTIONS 1-9: 8

## 2020-07-20 NOTE — NURSING NOTE
"Chief Complaint   Patient presents with     Lipids     Hypertension     Thyroid Problem       Initial BP (!) 162/84 (BP Location: Other (Comment), Patient Position: Sitting, Cuff Size: Adult Regular)   Pulse 106   Temp 100  F (37.8  C) (Tympanic)   SpO2 95%  Estimated body mass index is 40.45 kg/m  as calculated from the following:    Height as of 4/29/19: 1.803 m (5' 11\").    Weight as of 5/15/20: 131.5 kg (290 lb).  Medication Reconciliation: complete  Ashley A. Lechevalier, LPN  "

## 2020-07-20 NOTE — PATIENT INSTRUCTIONS
Assessment & Plan     1. Hyperlipidemia with target LDL less than 100  - Continue plan of care    2. History of deep venous thrombosis  - Continue plan of care    3. Encounter for monitoring statin therapy  - Lab UTD    4. Acquired hypothyroidism  - Continue plan of care    5. Benign essential hypertension  - lisinopril (ZESTRIL) 20 MG tablet; Take 1 tablet (20 mg) by mouth daily  Dispense: 90 tablet; Refill: 1 - DOSE INCREASE  - Please call in 2 weeks with BP readings    6. Anxiety  - Continue plan of care    7. Gastroesophageal reflux disease without esophagitis  - Continue plan of care         Return in about 6 months (around 1/20/2021).    Ayah Rojas CNP  [facility] [place] [facility]

## 2020-07-21 ASSESSMENT — ANXIETY QUESTIONNAIRES: GAD7 TOTAL SCORE: 7

## 2020-07-28 ENCOUNTER — ANTICOAGULATION THERAPY VISIT (OUTPATIENT)
Dept: ANTICOAGULATION | Facility: OTHER | Age: 60
End: 2020-07-28
Attending: NURSE PRACTITIONER
Payer: MEDICARE

## 2020-07-28 DIAGNOSIS — I26.99 PULMONARY EMBOLISM AND INFARCTION (H): ICD-10-CM

## 2020-07-28 DIAGNOSIS — Z86.711 HISTORY OF PULMONARY EMBOLISM: ICD-10-CM

## 2020-07-28 DIAGNOSIS — Z86.718 HISTORY OF DEEP VENOUS THROMBOSIS: ICD-10-CM

## 2020-07-28 DIAGNOSIS — J98.9 REACTIVE AIRWAY DISEASE THAT IS NOT ASTHMA: ICD-10-CM

## 2020-07-28 DIAGNOSIS — Z79.01 LONG TERM CURRENT USE OF ANTICOAGULANT THERAPY: ICD-10-CM

## 2020-07-28 LAB
CAPILLARY BLOOD COLLECTION: NORMAL
INR BLD: 7 (ref 0.86–1.14)

## 2020-07-28 PROCEDURE — 85610 PROTHROMBIN TIME: CPT | Mod: QW,ZL | Performed by: NURSE PRACTITIONER

## 2020-07-28 PROCEDURE — 36416 COLLJ CAPILLARY BLOOD SPEC: CPT | Mod: ZL | Performed by: NURSE PRACTITIONER

## 2020-07-28 NOTE — PROGRESS NOTES
ANTICOAGULATION FOLLOW-UP CLINIC VISIT    Patient Name:  Frances Corea  Date:  7/28/2020  Contact Type:  Telephone/ spoke to her mom Frances (patient with her mom)    SUBJECTIVE:  Patient Findings     Comments:   INR done by [facility] lab and call from , Cassandra. She reports INR to be 7.0.  I spoke to patient and her  Mom, Frances, prior to them leaving clinic. We discussed INR result, warfarin dosing/INR recheck date. We also discussed how to stop minor bleeding with pressure for 15 min and if it won't stop she will have to go to ER. She should go to ER immediately if any rectal bleeding, vomiting blood, urinating blood. Patient mom, Frances verbalized understanding of all instructions and has no questions. She will call if any unusual bleeding/bruising, new changes in diet/meds/activity. I also requested that patient increase vit K intake substantially over next 2 days. Note also sent to PCP re: INR result, dosing plan.        Clinical Outcomes     Negatives:   Major bleeding event, Thromboembolic event, Anticoagulation-related hospital admission, Anticoagulation-related ED visit, Anticoagulation-related fatality    Comments:   INR done by [facility] lab and call from , Cassandra. She reports INR to be 7.0.  I spoke to patient and her  Mom, Frances, prior to them leaving clinic. We discussed INR result, warfarin dosing/INR recheck date. We also discussed how to stop minor bleeding with pressure for 15 min and if it won't stop she will have to go to ER. She should go to ER immediately if any rectal bleeding, vomiting blood, urinating blood. Patient momFrances verbalized understanding of all instructions and has no questions. She will call if any unusual bleeding/bruising, new changes in diet/meds/activity. I also requested that patient increase vit K intake substantially over next 2 days. Note also sent to PCP re: INR result, dosing plan.           OBJECTIVE    Recent labs: (last 7 days)     07/28/20  1251   INR  7.0*       ASSESSMENT / PLAN  INR assessment SUPRA    Recheck INR In: 3 DAYS    INR Location Clinic      Anticoagulation Summary  As of 2020    INR goal:   2.0-3.0   TTR:   42.7 % (1 y)   INR used for dosin.0! (2020)   Warfarin maintenance plan:   10 mg (5 mg x 2) every Mon, Fri; 7.5 mg (5 mg x 1.5) all other days   Full warfarin instructions:   : Hold; : Hold; : 2.5 mg; Otherwise 10 mg every Mon, Fri; 7.5 mg all other days   Weekly warfarin total:   57.5 mg   Plan last modified:   Stefany Huynh, RN (10/3/2019)   Next INR check:   2020   Priority:   Maintenance   Target end date:   Indefinite    Indications    History of deep venous thrombosis [Z86.718]  History of pulmonary embolism [Z86.711]  Long-term (current) use of anticoagulants [Z79.01] (Resolved) [Z79.01]             Anticoagulation Episode Summary     INR check location:       Preferred lab:       Send INR reminders to:   ROME FIGUEREDO    Comments:   [facility] W; phone        Anticoagulation Care Providers     Provider Role Specialty Phone number    Crystal Ayah, CNP Referring [facility] [phone]            See the Encounter Report to view Anticoagulation Flowsheet and Dosing Calendar (Go to Encounters tab in chart review, and find the Anticoagulation Therapy Visit)        Stefany Huynh, RN

## 2020-07-28 NOTE — Clinical Note
Ayah  Frances Corea had an INR of 7.0 today. Not sure why, other than stress. She has no bleeding/bruising, no changes in diet/meds.. Her mom will bring her to ER if any uncontrollable bleeding. I am holding warfarin for 2 days and small dose day 3 and INR will be rechecked on 7/31/20. I also told her mom to increase vit K intake over next few days.      Stefany Huynh RN  Anticoagulation clinic

## 2020-07-30 RX ORDER — ALBUTEROL SULFATE 0.83 MG/ML
SOLUTION RESPIRATORY (INHALATION)
Qty: 180 ML | Refills: 1 | Status: SHIPPED | OUTPATIENT
Start: 2020-07-30

## 2020-07-31 ENCOUNTER — ANTICOAGULATION THERAPY VISIT (OUTPATIENT)
Dept: ANTICOAGULATION | Facility: OTHER | Age: 60
End: 2020-07-31
Attending: NURSE PRACTITIONER
Payer: MEDICARE

## 2020-07-31 DIAGNOSIS — Z86.718 HISTORY OF DEEP VENOUS THROMBOSIS: ICD-10-CM

## 2020-07-31 DIAGNOSIS — I26.99 PULMONARY EMBOLISM AND INFARCTION (H): ICD-10-CM

## 2020-07-31 DIAGNOSIS — Z79.01 LONG TERM CURRENT USE OF ANTICOAGULANT THERAPY: ICD-10-CM

## 2020-07-31 DIAGNOSIS — Z86.711 HISTORY OF PULMONARY EMBOLISM: ICD-10-CM

## 2020-07-31 LAB
CAPILLARY BLOOD COLLECTION: NORMAL
INR BLD: 1.8 (ref 0.86–1.14)

## 2020-07-31 PROCEDURE — 85610 PROTHROMBIN TIME: CPT | Mod: QW,ZL | Performed by: NURSE PRACTITIONER

## 2020-07-31 PROCEDURE — 36416 COLLJ CAPILLARY BLOOD SPEC: CPT | Mod: ZL | Performed by: NURSE PRACTITIONER

## 2020-07-31 NOTE — PROGRESS NOTES
ANTICOAGULATION FOLLOW-UP CLINIC VISIT    Patient Name:  Frances Corea  Date:  2020  Contact Type:  Telephone/ message left on her motherFrances's home phone  voicemail    SUBJECTIVE:  Patient Findings     Comments:   INR done by lab. Call placed to patient motherFrances (caregiver) and message left on home phone voicemail re: INR result, warfarin dosing/INR recheck date. She is to call warfarin clinic if patient has any unusual bleeding/bruising, new changes in diet/meds/activity, questions or illness        Clinical Outcomes     Negatives:   Major bleeding event, Thromboembolic event, Anticoagulation-related hospital admission, Anticoagulation-related ED visit, Anticoagulation-related fatality    Comments:   INR done by lab. Call placed to patient motherFrances (caregiver) and message left on home phone voicemail re: INR result, warfarin dosing/INR recheck date. She is to call warfarin clinic if patient has any unusual bleeding/bruising, new changes in diet/meds/activity, questions or illness           OBJECTIVE    Recent labs: (last 7 days)     20  1220   INR 1.8*       ASSESSMENT / PLAN  INR assessment SUB dose held x 2 for elevated INR this week   Recheck INR In: 2 WEEKS    INR Location Clinic      Anticoagulation Summary  As of 2020    INR goal:   2.0-3.0   TTR:   42.5 % (1 y)   INR used for dosin.8! (2020)   Warfarin maintenance plan:   10 mg (5 mg x 2) every Mon, Fri; 7.5 mg (5 mg x 1.5) all other days   Full warfarin instructions:   10 mg every Mon, Fri; 7.5 mg all other days   Weekly warfarin total:   57.5 mg   No change documented:   Stefany Huynh RN   Plan last modified:   Stefany Huynh RN (10/3/2019)   Next INR check:   2020   Priority:   High   Target end date:   Indefinite    Indications    History of deep venous thrombosis [Z86.718]  History of pulmonary embolism [Z86.711]  Long-term (current) use of anticoagulants [Z79.01] (Resolved) [Z79.01]              Anticoagulation Episode Summary     INR check location:       Preferred lab:       Send INR reminders to:   ROME FIGUEREDO    Comments:   [facility] W; phone        Anticoagulation Care Providers     Provider Role Specialty Phone number    Ayah Rojas CNP Referring [facility] [phone]            See the Encounter Report to view Anticoagulation Flowsheet and Dosing Calendar (Go to Encounters tab in chart review, and find the Anticoagulation Therapy Visit)        Stefany Huynh RN

## 2020-08-04 DIAGNOSIS — M25.562 LEFT KNEE PAIN, UNSPECIFIED CHRONICITY: ICD-10-CM

## 2020-08-05 NOTE — TELEPHONE ENCOUNTER
tramadol      Last Written Prescription Date:  7/10/20  Last Fill Quantity: 60,   # refills: 0  Last Office Visit: 7/20/20  Future Office visit:       Routing refill request to provider for review/approval because:

## 2020-08-06 RX ORDER — TRAMADOL HYDROCHLORIDE 50 MG/1
TABLET ORAL
Qty: 60 TABLET | Refills: 0 | Status: SHIPPED | OUTPATIENT
Start: 2020-08-06 | End: 2020-09-09

## 2020-08-14 ENCOUNTER — ANTICOAGULATION THERAPY VISIT (OUTPATIENT)
Dept: ANTICOAGULATION | Facility: OTHER | Age: 60
End: 2020-08-14
Attending: NURSE PRACTITIONER
Payer: MEDICARE

## 2020-08-14 ENCOUNTER — TELEPHONE (OUTPATIENT)
Dept: FAMILY MEDICINE | Facility: OTHER | Age: 60
End: 2020-08-14

## 2020-08-14 DIAGNOSIS — Z79.01 LONG TERM CURRENT USE OF ANTICOAGULANT THERAPY: ICD-10-CM

## 2020-08-14 DIAGNOSIS — Z86.711 HISTORY OF PULMONARY EMBOLISM: ICD-10-CM

## 2020-08-14 DIAGNOSIS — I26.99 PULMONARY EMBOLISM AND INFARCTION (H): ICD-10-CM

## 2020-08-14 DIAGNOSIS — Z86.718 HISTORY OF DEEP VENOUS THROMBOSIS: ICD-10-CM

## 2020-08-14 LAB
CAPILLARY BLOOD COLLECTION: NORMAL
INR BLD: >8 (ref 0.86–1.14)
INR PPP: 6.2 (ref 0.86–1.14)

## 2020-08-14 PROCEDURE — 85610 PROTHROMBIN TIME: CPT | Mod: QW,ZL | Performed by: NURSE PRACTITIONER

## 2020-08-14 PROCEDURE — 85610 PROTHROMBIN TIME: CPT | Mod: ZL | Performed by: NURSE PRACTITIONER

## 2020-08-14 PROCEDURE — 36416 COLLJ CAPILLARY BLOOD SPEC: CPT | Mod: ZL | Performed by: NURSE PRACTITIONER

## 2020-08-14 NOTE — TELEPHONE ENCOUNTER
DATE:  8/14/2020   TIME OF RECEIPT FROM LAB:  4:17 pm  LAB TEST:  INR  LAB VALUE:  6.20  RESULTS GIVEN WITH READ-BACK TO (PROVIDER):  the provider you called        TIME LAB VALUE REPORTED TO PROVIDER:   4:19PM    No answer in [facility]madin Clinic.LM for them to call back and updated on orders given by .    New order to Hold Coumadin today, tomorrow, and only take 1/2 dose Sunday. Call Coumadin Clinic Monday AM. Monitor for s/s of bleeding.Go to ED if s/s of bleeding.Updated pt and her mother who read back directions.Verbalized understanding.      Ellen Quijano RN

## 2020-08-14 NOTE — PROGRESS NOTES
ANTICOAGULATION FOLLOW-UP CLINIC VISIT    Patient Name:  Frances Corea  Date:  8/14/2020  Contact Type:  Telephone/ message left on her mom, Frances (caregiver) voicemail    SUBJECTIVE:  Patient Findings     Comments:   INR done by lab and  called result to warfarin clinic. INR is over 8.0 for unknown reason. Call placed to her mother, Frances (caregiver) and message left on voicemail re: INR result, warfarin dosing/INR recheck date. I also told her if there is any bleeding she cannot get stopped with pressure x 15 min then she will have to get Frances to the ER. I requested that vit K intake be increased significantly over the weekend. I requested call back if any new changes in diet/meds/activity, bleeding/bruising, questions or illness.         Clinical Outcomes     Negatives:   Major bleeding event, Thromboembolic event, Anticoagulation-related hospital admission, Anticoagulation-related ED visit, Anticoagulation-related fatality    Comments:   INR done by lab and  called result to warfarin clinic. INR is over 8.0 for unknown reason. Call placed to her mother, Frances (caregiver) and message left on voicemail re: INR result, warfarin dosing/INR recheck date. I also told her if there is any bleeding she cannot get stopped with pressure x 15 min then she will have to get Frances to the ER. I requested that vit K intake be increased significantly over the weekend. I requested call back if any new changes in diet/meds/activity, bleeding/bruising, questions or illness.            OBJECTIVE    Recent labs: (last 7 days)     08/14/20  1310   INR >8.0*       ASSESSMENT / PLAN  INR assessment SUPRA    Recheck INR In: 3 DAYS    INR Location Clinic      Anticoagulation Summary  As of 8/14/2020    INR goal:   2.0-3.0   TTR:   44.2 % (11.7 mo)   INR used for dosing:   >8.0! (8/14/2020)   Warfarin maintenance plan:   7.5 mg (5 mg x 1.5) every day   Full warfarin instructions:   8/14: Hold; 8/15: Hold; 8/16: 2.5 mg;  Otherwise 7.5 mg every day   Weekly warfarin total:   52.5 mg   Plan last modified:   Stefany Huynh RN (8/14/2020)   Next INR check:   8/17/2020   Priority:   High   Target end date:   Indefinite    Indications    History of deep venous thrombosis [Z86.718]  History of pulmonary embolism [Z86.711]  Long-term (current) use of anticoagulants [Z79.01] (Resolved) [Z79.01]             Anticoagulation Episode Summary     INR check location:       Preferred lab:       Send INR reminders to:   ROME FIGUEREDO    Comments:   [facility] W; phone        Anticoagulation Care Providers     Provider Role Specialty Phone number    Ayah Rojas, CNP Referring [facility] [phone]            See the Encounter Report to view Anticoagulation Flowsheet and Dosing Calendar (Go to Encounters tab in chart review, and find the Anticoagulation Therapy Visit)        Stefany Huynh, RN

## 2020-08-14 NOTE — Clinical Note
Frances Mathias had INR over 8.0. I am not sure why. I am holding warfarin today and tomorrow, giving small dose on Sunday  and having INR recheck on Mon 8/17. I told her mom to bring her to ER if ANY uncontrolled bleeding.  I am also going to decrease her overall dose.  Stefany Huynh RN  Anticoagulation clinic

## 2020-08-17 ENCOUNTER — ANTICOAGULATION THERAPY VISIT (OUTPATIENT)
Dept: ANTICOAGULATION | Facility: OTHER | Age: 60
End: 2020-08-17
Attending: NURSE PRACTITIONER
Payer: MEDICARE

## 2020-08-17 DIAGNOSIS — Z86.711 HISTORY OF PULMONARY EMBOLISM: ICD-10-CM

## 2020-08-17 DIAGNOSIS — Z86.718 HISTORY OF DEEP VENOUS THROMBOSIS: ICD-10-CM

## 2020-08-17 DIAGNOSIS — Z79.01 LONG TERM CURRENT USE OF ANTICOAGULANT THERAPY: ICD-10-CM

## 2020-08-17 DIAGNOSIS — I26.99 PULMONARY EMBOLISM AND INFARCTION (H): ICD-10-CM

## 2020-08-17 LAB
CAPILLARY BLOOD COLLECTION: NORMAL
INR BLD: 2.3 (ref 0.86–1.14)

## 2020-08-17 PROCEDURE — 85610 PROTHROMBIN TIME: CPT | Mod: QW,ZL | Performed by: NURSE PRACTITIONER

## 2020-08-17 PROCEDURE — 36416 COLLJ CAPILLARY BLOOD SPEC: CPT | Mod: ZL | Performed by: NURSE PRACTITIONER

## 2020-08-17 NOTE — PROGRESS NOTES
ANTICOAGULATION FOLLOW-UP CLINIC VISIT    Patient Name:  Frances Coera  Date:  2020  Contact Type:  Telephone/ message left on motherFrances (guardian) home phone voicemail    SUBJECTIVE:  Patient Findings     Comments:   INR done by lab. Call placed to patient Frances silverio (guardian) and message left on home phone voicemail re: INR result, warfarin dosing/INR recheck date. She is to call is Frances has any bleeding/bruisng, new changes in diet/meds/activity, questions or illness        Clinical Outcomes     Negatives:   Major bleeding event, Thromboembolic event, Anticoagulation-related hospital admission, Anticoagulation-related ED visit, Anticoagulation-related fatality    Comments:   INR done by lab. Call placed to patient Frances silverio (guardian) and message left on home phone voicemail re: INR result, warfarin dosing/INR recheck date. She is to call is Frances has any bleeding/bruisng, new changes in diet/meds/activity, questions or illness           OBJECTIVE    Recent labs: (last 7 days)     20  1445   INR 2.3*       ASSESSMENT / PLAN  INR assessment THER    Recheck INR In: 10 DAYS    INR Location Clinic      Anticoagulation Summary  As of 2020    INR goal:   2.0-3.0   TTR:   43.5 % (1 y)   INR used for dosin.3 (2020)   Warfarin maintenance plan:   5 mg (5 mg x 1) every Fri; 7.5 mg (5 mg x 1.5) all other days   Full warfarin instructions:   5 mg every Fri; 7.5 mg all other days   Weekly warfarin total:   50 mg   Plan last modified:   Stefany Huynh RN (2020)   Next INR check:   2020   Priority:   High   Target end date:   Indefinite    Indications    History of deep venous thrombosis [Z86.718]  History of pulmonary embolism [Z86.711]  Long-term (current) use of anticoagulants [Z79.01] (Resolved) [Z79.01]             Anticoagulation Episode Summary     INR check location:       Preferred lab:       Send INR reminders to:   ROME FIGUEREDO    Comments:   [facility] W; phone        Anticoagulation Care Providers     Provider Role Specialty Phone number    Ayah Rojas CNP Referring [facility] [phone]            See the Encounter Report to view Anticoagulation Flowsheet and Dosing Calendar (Go to Encounters tab in chart review, and find the Anticoagulation Therapy Visit)        Stefany Huynh RN

## 2020-08-18 ENCOUNTER — MEDICAL CORRESPONDENCE (OUTPATIENT)
Dept: HEALTH INFORMATION MANAGEMENT | Facility: CLINIC | Age: 60
End: 2020-08-18

## 2020-08-28 ENCOUNTER — TELEPHONE (OUTPATIENT)
Dept: FAMILY MEDICINE | Facility: OTHER | Age: 60
End: 2020-08-28

## 2020-08-28 ENCOUNTER — ANTICOAGULATION THERAPY VISIT (OUTPATIENT)
Dept: ANTICOAGULATION | Facility: OTHER | Age: 60
End: 2020-08-28
Attending: NURSE PRACTITIONER
Payer: MEDICARE

## 2020-08-28 DIAGNOSIS — Z79.899 ENCOUNTER FOR LONG-TERM (CURRENT) USE OF OTHER MEDICATIONS: ICD-10-CM

## 2020-08-28 DIAGNOSIS — Z79.01 LONG TERM CURRENT USE OF ANTICOAGULANT THERAPY: ICD-10-CM

## 2020-08-28 DIAGNOSIS — F25.0 SCHIZOAFFECTIVE DISORDER, BIPOLAR TYPE (H): Primary | ICD-10-CM

## 2020-08-28 DIAGNOSIS — I26.99 PULMONARY EMBOLISM AND INFARCTION (H): ICD-10-CM

## 2020-08-28 DIAGNOSIS — Z86.718 HISTORY OF DEEP VENOUS THROMBOSIS: ICD-10-CM

## 2020-08-28 DIAGNOSIS — Z86.711 HISTORY OF PULMONARY EMBOLISM: ICD-10-CM

## 2020-08-28 LAB
ALBUMIN SERPL-MCNC: 4 G/DL (ref 3.4–5)
ALP SERPL-CCNC: 97 U/L (ref 40–150)
ALT SERPL W P-5'-P-CCNC: 34 U/L (ref 0–50)
ANION GAP SERPL CALCULATED.3IONS-SCNC: 6 MMOL/L (ref 3–14)
AST SERPL W P-5'-P-CCNC: 25 U/L (ref 0–45)
BASOPHILS # BLD AUTO: 0 10E9/L (ref 0–0.2)
BASOPHILS NFR BLD AUTO: 0.2 %
BILIRUB DIRECT SERPL-MCNC: 0.2 MG/DL (ref 0–0.2)
BILIRUB SERPL-MCNC: 0.8 MG/DL (ref 0.2–1.3)
BUN SERPL-MCNC: 17 MG/DL (ref 7–30)
CALCIUM SERPL-MCNC: 10.5 MG/DL (ref 8.5–10.1)
CAPILLARY BLOOD COLLECTION: NORMAL
CHLORIDE SERPL-SCNC: 109 MMOL/L (ref 94–109)
CHOLEST SERPL-MCNC: 156 MG/DL
CO2 SERPL-SCNC: 25 MMOL/L (ref 20–32)
CREAT SERPL-MCNC: 0.68 MG/DL (ref 0.52–1.04)
DIFFERENTIAL METHOD BLD: ABNORMAL
EOSINOPHIL # BLD AUTO: 0.1 10E9/L (ref 0–0.7)
EOSINOPHIL NFR BLD AUTO: 0.6 %
ERYTHROCYTE [DISTWIDTH] IN BLOOD BY AUTOMATED COUNT: 15.6 % (ref 10–15)
EST. AVERAGE GLUCOSE BLD GHB EST-MCNC: 103 MG/DL
GFR SERPL CREATININE-BSD FRML MDRD: >90 ML/MIN/{1.73_M2}
GLUCOSE SERPL-MCNC: 123 MG/DL (ref 70–99)
HBA1C MFR BLD: 5.2 % (ref 0–5.6)
HCT VFR BLD AUTO: 46.6 % (ref 35–47)
HDLC SERPL-MCNC: 58 MG/DL
HGB BLD-MCNC: 14.6 G/DL (ref 11.7–15.7)
INR BLD: >8 (ref 0.86–1.14)
INR PPP: 6.1 (ref 0.86–1.14)
LDLC SERPL CALC-MCNC: 79 MG/DL
LITHIUM SERPL-SCNC: 1.21 MMOL/L (ref 0.6–1.2)
LYMPHOCYTES # BLD AUTO: 1.7 10E9/L (ref 0.8–5.3)
LYMPHOCYTES NFR BLD AUTO: 13.4 %
MCH RBC QN AUTO: 29 PG (ref 26.5–33)
MCHC RBC AUTO-ENTMCNC: 31.3 G/DL (ref 31.5–36.5)
MCV RBC AUTO: 93 FL (ref 78–100)
MONOCYTES # BLD AUTO: 0.6 10E9/L (ref 0–1.3)
MONOCYTES NFR BLD AUTO: 4.4 %
NEUTROPHILS # BLD AUTO: 10.2 10E9/L (ref 1.6–8.3)
NEUTROPHILS NFR BLD AUTO: 81.4 %
NONHDLC SERPL-MCNC: 98 MG/DL
PLATELET # BLD AUTO: 173 10E9/L (ref 150–450)
POTASSIUM SERPL-SCNC: 4.1 MMOL/L (ref 3.4–5.3)
PROT SERPL-MCNC: 8 G/DL (ref 6.8–8.8)
RBC # BLD AUTO: 5.03 10E12/L (ref 3.8–5.2)
SODIUM SERPL-SCNC: 140 MMOL/L (ref 133–144)
T4 FREE SERPL-MCNC: 1.08 NG/DL (ref 0.76–1.46)
TRIGL SERPL-MCNC: 93 MG/DL
TSH SERPL DL<=0.005 MIU/L-ACNC: 1.98 MU/L (ref 0.4–4)
WBC # BLD AUTO: 12.5 10E9/L (ref 4–11)

## 2020-08-28 PROCEDURE — 84443 ASSAY THYROID STIM HORMONE: CPT | Mod: ZL | Performed by: NURSE PRACTITIONER

## 2020-08-28 PROCEDURE — 80048 BASIC METABOLIC PNL TOTAL CA: CPT | Mod: ZL | Performed by: NURSE PRACTITIONER

## 2020-08-28 PROCEDURE — 85610 PROTHROMBIN TIME: CPT | Mod: ZL | Performed by: NURSE PRACTITIONER

## 2020-08-28 PROCEDURE — 36416 COLLJ CAPILLARY BLOOD SPEC: CPT | Mod: ZL | Performed by: NURSE PRACTITIONER

## 2020-08-28 PROCEDURE — 40000788 ZZHCL STATISTIC ESTIMATED AVERAGE GLUCOSE: Mod: ZL | Performed by: NURSE PRACTITIONER

## 2020-08-28 PROCEDURE — 80178 ASSAY OF LITHIUM: CPT | Mod: ZL | Performed by: NURSE PRACTITIONER

## 2020-08-28 PROCEDURE — 85610 PROTHROMBIN TIME: CPT | Mod: QW,ZL | Performed by: NURSE PRACTITIONER

## 2020-08-28 PROCEDURE — 84480 ASSAY TRIIODOTHYRONINE (T3): CPT | Mod: ZL | Performed by: NURSE PRACTITIONER

## 2020-08-28 PROCEDURE — 83036 HEMOGLOBIN GLYCOSYLATED A1C: CPT | Mod: ZL | Performed by: NURSE PRACTITIONER

## 2020-08-28 PROCEDURE — 85025 COMPLETE CBC W/AUTO DIFF WBC: CPT | Mod: ZL | Performed by: NURSE PRACTITIONER

## 2020-08-28 PROCEDURE — 80061 LIPID PANEL: CPT | Mod: ZL | Performed by: NURSE PRACTITIONER

## 2020-08-28 PROCEDURE — 80076 HEPATIC FUNCTION PANEL: CPT | Mod: ZL | Performed by: NURSE PRACTITIONER

## 2020-08-28 PROCEDURE — 84439 ASSAY OF FREE THYROXINE: CPT | Mod: ZL | Performed by: NURSE PRACTITIONER

## 2020-08-28 NOTE — PROGRESS NOTES
ANTICOAGULATION FOLLOW-UP CLINIC VISIT    Patient Name:  Frances Corea  Date:  8/28/2020  Contact Type:  Telephone/ spoke to [employer], DIY and patient and her mom via phone prior to leaving clinic    SUBJECTIVE:  Patient Findings     Comments:   INR done by lab. Call from [facility] stating INR is over 8.0.  She brought patient into room and put me on speaker phone and we discussed warfarin dosing/INR recheck date. Frances is unsure what day she is having chemo and it is either 9/1 or 9/2 but she will call Monday morning to let me know so appointment can be changed if necessary. She states patient is eating vit K foods at least once daily. No bleeding/bruising, no med changes. I told Frances that if Frances has any bleeding she cannot get stopped with pressure x 15 min she is to go to ER. Cassandra (lab tech) states she wrote down all instructions for Frances and I told Frances to call warfarin clinic if any questions. She verbalized understanding and has no questions. Note sent to KALLIE Rojas with INR result, warfarin dosing plan and INR recheck date.         Clinical Outcomes     Negatives:   Major bleeding event, Thromboembolic event, Anticoagulation-related hospital admission, Anticoagulation-related ED visit, Anticoagulation-related fatality    Comments:   INR done by lab. Call from [facility] stating INR is over 8.0.  She brought patient into room and put me on speaker phone and we discussed warfarin dosing/INR recheck date. Frances is unsure what day she is having chemo and it is either 9/1 or 9/2 but she will call Monday morning to let me know so appointment can be changed if necessary. She states patient is eating vit K foods at least once daily. No bleeding/bruising, no med changes. I told Frances that if Frances has any bleeding she cannot get stopped with pressure x 15 min she is to go to ER. DIY (lab tech) states she wrote down all instructions for Frances and I told Frances to call warfarin clinic if any  questions. She verbalized understanding and has no questions. Note sent to KALLIE Rojas with INR result, warfarin dosing plan and INR recheck date.            OBJECTIVE    Recent labs: (last 7 days)     08/28/20  1252   INR >8.0*       ASSESSMENT / PLAN  INR assessment SUPRA    Recheck INR In: 4 DAYS 4 or 5 days depending on when her mom has chemo next week   INR Location Clinic      Anticoagulation Summary  As of 8/28/2020    INR goal:   2.0-3.0   TTR:   44.8 % (11.8 mo)   INR used for dosing:   >8.0! (8/28/2020)   Warfarin maintenance plan:   5 mg (5 mg x 1) every Mon, Wed, Fri; 7.5 mg (5 mg x 1.5) all other days   Full warfarin instructions:   8/28: Hold; 8/29: Hold; 8/30: 2.5 mg; 8/31: 5 mg; 9/1: 5 mg; Otherwise 5 mg every Mon, Wed, Fri; 7.5 mg all other days   Weekly warfarin total:   45 mg   Plan last modified:   Stefany Huynh RN (8/28/2020)   Next INR check:   9/2/2020   Priority:   Critical   Target end date:   Indefinite    Indications    History of deep venous thrombosis [Z86.718]  History of pulmonary embolism [Z86.711]  Long-term (current) use of anticoagulants [Z79.01] (Resolved) [Z79.01]             Anticoagulation Episode Summary     INR check location:       Preferred lab:       Send INR reminders to:   ROME FIGUEREDO    Comments:   [facility] W; phone        Anticoagulation Care Providers     Provider Role Specialty Phone number    Ayah Rojas, CNP Referring [facility] [phone]            See the Encounter Report to view Anticoagulation Flowsheet and Dosing Calendar (Go to Encounters tab in chart review, and find the Anticoagulation Therapy Visit)        Stefany Huynh, RN

## 2020-08-28 NOTE — Clinical Note
Ayah    Frances Vargas had INR over 8.0 at lab today. I am unsure why her INR keeps jumping but am holding warfarin x 2 days, decreasing dose day 3 and the decreasing overall dose. INR will be done either 9/1 or 9/2 depending on when her mom has chemo and can get her to clinic. Her mom was told to bring Frances to ER for ANY uncontrolled bleeding.    Stefany Huynh RN  Anticoagulation clinic

## 2020-08-28 NOTE — TELEPHONE ENCOUNTER
DATE:  8/28/2020   TIME OF RECEIPT FROM LAB: 4:49 Yvonne Suh  LAB TEST:  INR  LAB VALUE:  6.10  RESULTS GIVEN WITH READ-BACK TO (PROVIDER):  TIME LAB VALUE REPORTED TO PROVIDER:  4:53 pm        Ellen Quijano RN

## 2020-08-31 LAB — T3 SERPL-MCNC: 109 NG/DL (ref 60–181)

## 2020-09-01 ENCOUNTER — ANTICOAGULATION THERAPY VISIT (OUTPATIENT)
Dept: ANTICOAGULATION | Facility: OTHER | Age: 60
End: 2020-09-01
Attending: NURSE PRACTITIONER
Payer: MEDICARE

## 2020-09-01 DIAGNOSIS — Z86.711 HISTORY OF PULMONARY EMBOLISM: ICD-10-CM

## 2020-09-01 DIAGNOSIS — Z79.01 LONG TERM CURRENT USE OF ANTICOAGULANT THERAPY: ICD-10-CM

## 2020-09-01 DIAGNOSIS — I26.99 PULMONARY EMBOLISM AND INFARCTION (H): ICD-10-CM

## 2020-09-01 DIAGNOSIS — Z86.718 HISTORY OF DEEP VENOUS THROMBOSIS: ICD-10-CM

## 2020-09-01 LAB
CAPILLARY BLOOD COLLECTION: NORMAL
INR BLD: 2.6 (ref 0.86–1.14)

## 2020-09-01 PROCEDURE — 85610 PROTHROMBIN TIME: CPT | Mod: QW,ZL | Performed by: NURSE PRACTITIONER

## 2020-09-01 PROCEDURE — 36416 COLLJ CAPILLARY BLOOD SPEC: CPT | Mod: ZL | Performed by: NURSE PRACTITIONER

## 2020-09-01 NOTE — PROGRESS NOTES
ANTICOAGULATION FOLLOW-UP CLINIC VISIT    Patient Name:  Frances Corea  Date:  2020  Contact Type:  Telephone/ message left on mother, Frances (caregiver) voicemail    SUBJECTIVE:  Patient Findings     Comments:   INR done by lab. Call placed to patient mother, caregiver, Frances and detailed message left on home phone voicemail re: INR result, warfarin dosing/INR recheck date. Patient mother to call warfarin clinic if patient has any unusual bleeding/bruising, new changes in diet/meds/activity, questions or illness        Clinical Outcomes     Negatives:   Major bleeding event, Thromboembolic event, Anticoagulation-related hospital admission, Anticoagulation-related ED visit, Anticoagulation-related fatality    Comments:   INR done by lab. Call placed to patient mother, caregiver, Frances and detailed message left on home phone voicemail re: INR result, warfarin dosing/INR recheck date. Patient mother to call warfarin clinic if patient has any unusual bleeding/bruising, new changes in diet/meds/activity, questions or illness           OBJECTIVE    Recent labs: (last 7 days)     20  1356   INR 2.6*       ASSESSMENT / PLAN  INR assessment THER    Recheck INR In: 1 WEEK    INR Location Clinic      Anticoagulation Summary  As of 2020    INR goal:   2.0-3.0   TTR:   44.2 % (1 y)   INR used for dosin.6 (2020)   Warfarin maintenance plan:   7.5 mg (5 mg x 1.5) every Mon, Wed, Fri; 5 mg (5 mg x 1) all other days   Full warfarin instructions:   : 5 mg; Otherwise 7.5 mg every Mon, Wed, Fri; 5 mg all other days   Weekly warfarin total:   42.5 mg   Plan last modified:   Stefany Huynh RN (2020)   Next INR check:   2020   Priority:   High   Target end date:   Indefinite    Indications    History of deep venous thrombosis [Z86.718]  History of pulmonary embolism [Z86.711]  Long-term (current) use of anticoagulants [Z79.01] (Resolved) [Z79.01]             Anticoagulation Episode Summary     INR  check location:       Preferred lab:       Send INR reminders to:   ROME FIGUEREDO    Comments:   [facility] W; phone        Anticoagulation Care Providers     Provider Role Specialty Phone number    Ayah Rojas CNP Referring [facility] [phone]            See the Encounter Report to view Anticoagulation Flowsheet and Dosing Calendar (Go to Encounters tab in chart review, and find the Anticoagulation Therapy Visit)        Stefany Huynh RN

## 2020-09-04 ENCOUNTER — TELEPHONE (OUTPATIENT)
Dept: ANTICOAGULATION | Facility: OTHER | Age: 60
End: 2020-09-04

## 2020-09-04 NOTE — TELEPHONE ENCOUNTER
Patient mother/caregiver, called anticoagulation clinic requesting INR recheck appointment be switched from 9/8 to 9/11. She states that anny and another appointment that day and it would be easier for her to do both appointment on the same day. INR appointment changed to 9/11.  Her mom verbalized understanding of appointment date and time and has no further questions.

## 2020-09-08 DIAGNOSIS — M25.562 LEFT KNEE PAIN, UNSPECIFIED CHRONICITY: ICD-10-CM

## 2020-09-09 RX ORDER — TRAMADOL HYDROCHLORIDE 50 MG/1
TABLET ORAL
Qty: 60 TABLET | Refills: 3 | Status: SHIPPED | OUTPATIENT
Start: 2020-09-09

## 2020-09-09 NOTE — TELEPHONE ENCOUNTER
tramadol  Last Written Prescription Date: 8/6/2020  Last Fill Quantity: 60 # of Refills: 0  Last Office Visit: 7/20/2020

## 2020-09-11 ENCOUNTER — ANTICOAGULATION THERAPY VISIT (OUTPATIENT)
Dept: ANTICOAGULATION | Facility: OTHER | Age: 60
End: 2020-09-11
Attending: NURSE PRACTITIONER
Payer: MEDICARE

## 2020-09-11 DIAGNOSIS — I26.99 PULMONARY EMBOLISM AND INFARCTION (H): ICD-10-CM

## 2020-09-11 DIAGNOSIS — Z86.711 HISTORY OF PULMONARY EMBOLISM: ICD-10-CM

## 2020-09-11 DIAGNOSIS — Z86.718 HISTORY OF DEEP VENOUS THROMBOSIS: ICD-10-CM

## 2020-09-11 DIAGNOSIS — Z79.01 LONG TERM CURRENT USE OF ANTICOAGULANT THERAPY: ICD-10-CM

## 2020-09-11 LAB
CAPILLARY BLOOD COLLECTION: NORMAL
INR BLD: 5.1 (ref 0.86–1.14)

## 2020-09-11 PROCEDURE — 36416 COLLJ CAPILLARY BLOOD SPEC: CPT | Mod: ZL | Performed by: NURSE PRACTITIONER

## 2020-09-11 PROCEDURE — 85610 PROTHROMBIN TIME: CPT | Mod: QW,ZL | Performed by: NURSE PRACTITIONER

## 2020-09-11 NOTE — PROGRESS NOTES
ANTICOAGULATION FOLLOW-UP CLINIC VISIT    Patient Name:  Frances Corea  Date:  2020  Contact Type:  Telephone/ spoke to patient mother, Caregiver, Frances    SUBJECTIVE:  Patient Findings     Comments:   INR done by lab and result called to warfarin clinic by [employer]. Call placed to patient mother home and spoke to her mom, caregiver, re: INR result, warfarin dosing/INR recheck date. Frances has no bleeding/bruising, no new changes in diet/meds/activity. Frances verbalized understanding of warfarin dosing/INR recheck date and has no questions.         Clinical Outcomes     Negatives:   Major bleeding event, Thromboembolic event, Anticoagulation-related hospital admission, Anticoagulation-related ED visit, Anticoagulation-related fatality    Comments:   INR done by lab and result called to warfarin clinic by [employer]. Call placed to patient mother home and spoke to her mom, caregiver, re: INR result, warfarin dosing/INR recheck date. Frances has no bleeding/bruising, no new changes in diet/meds/activity. Frances verbalized understanding of warfarin dosing/INR recheck date and has no questions.            OBJECTIVE    Recent labs: (last 7 days)     20  1236   INR 5.1*       ASSESSMENT / PLAN  INR assessment SUPRA    Recheck INR In: 6 DAYS    INR Location Clinic      Anticoagulation Summary  As of 2020    INR goal:   2.0-3.0   TTR:   44.6 % (1 y)   INR used for dosin.1! (2020)   Warfarin maintenance plan:   5 mg (5 mg x 1) every day   Full warfarin instructions:   : Hold; : 2.5 mg; Otherwise 5 mg every day   Weekly warfarin total:   35 mg   Plan last modified:   Stefany Huynh RN (2020)   Next INR check:   2020   Priority:   High   Target end date:   Indefinite    Indications    History of deep venous thrombosis [Z86.718]  History of pulmonary embolism [Z86.711]  Long-term (current) use of anticoagulants [Z79.01] (Resolved) [Z79.01]             Anticoagulation  Episode Summary     INR check location:       Preferred lab:       Send INR reminders to:   ROME FIGUEREDO    Comments:   [facility] W; phone        Anticoagulation Care Providers     Provider Role Specialty Phone number    Ayah Rojas CNP Referring [facility] [phone]            See the Encounter Report to view Anticoagulation Flowsheet and Dosing Calendar (Go to Encounters tab in chart review, and find the Anticoagulation Therapy Visit)        Stefany Huynh RN

## 2020-09-17 ENCOUNTER — ANTICOAGULATION THERAPY VISIT (OUTPATIENT)
Dept: ANTICOAGULATION | Facility: OTHER | Age: 60
End: 2020-09-17
Attending: NURSE PRACTITIONER
Payer: MEDICARE

## 2020-09-17 DIAGNOSIS — Z86.711 HISTORY OF PULMONARY EMBOLISM: ICD-10-CM

## 2020-09-17 DIAGNOSIS — Z86.718 HISTORY OF DEEP VENOUS THROMBOSIS: ICD-10-CM

## 2020-09-17 DIAGNOSIS — F25.1 SCHIZOAFFECTIVE DISORDER, DEPRESSIVE TYPE (H): Primary | ICD-10-CM

## 2020-09-17 DIAGNOSIS — Z79.01 LONG TERM CURRENT USE OF ANTICOAGULANT THERAPY: ICD-10-CM

## 2020-09-17 DIAGNOSIS — I26.99 PULMONARY EMBOLISM AND INFARCTION (H): ICD-10-CM

## 2020-09-17 LAB
CAPILLARY BLOOD COLLECTION: NORMAL
INR BLD: 2 (ref 0.86–1.14)
LITHIUM SERPL-SCNC: 1.34 MMOL/L (ref 0.6–1.2)

## 2020-09-17 PROCEDURE — 85610 PROTHROMBIN TIME: CPT | Mod: QW,ZL | Performed by: NURSE PRACTITIONER

## 2020-09-17 PROCEDURE — 80178 ASSAY OF LITHIUM: CPT | Mod: ZL | Performed by: NURSE PRACTITIONER

## 2020-09-17 PROCEDURE — 36416 COLLJ CAPILLARY BLOOD SPEC: CPT | Mod: ZL | Performed by: NURSE PRACTITIONER

## 2020-09-17 NOTE — PROGRESS NOTES
ANTICOAGULATION FOLLOW-UP CLINIC VISIT    Patient Name:  Frances Corea  Date:  2020  Contact Type:  Telephone, spoke to her mother, (caregiver), Frances    SUBJECTIVE:  Patient Findings     Comments:   INR done by lab. Call placed to patient mom (caregiver) and spoke to her re: INR result, warfarin dosing/INR recheck date. She verbalized understanding of instruction and has no questions. She will call warfarin clinic if any changes/issues/illness        Clinical Outcomes     Negatives:   Major bleeding event, Thromboembolic event, Anticoagulation-related hospital admission, Anticoagulation-related ED visit, Anticoagulation-related fatality    Comments:   INR done by lab. Call placed to patient mom (caregiver) and spoke to her re: INR result, warfarin dosing/INR recheck date. She verbalized understanding of instruction and has no questions. She will call warfarin clinic if any changes/issues/illness           OBJECTIVE    Recent labs: (last 7 days)     20  1310   INR 2.0*       ASSESSMENT / PLAN  INR assessment THER    Recheck INR In: 10 DAYS    INR Location Clinic      Anticoagulation Summary  As of 2020    INR goal:   2.0-3.0   TTR:   44.8 % (1 y)   INR used for dosin.0 (2020)   Warfarin maintenance plan:   5 mg (5 mg x 1) every day   Full warfarin instructions:   5 mg every day   Weekly warfarin total:   35 mg   No change documented:   Stefany Huynh, RN   Plan last modified:   Stefany Huynh RN (2020)   Next INR check:   2020   Priority:   High   Target end date:   Indefinite    Indications    History of deep venous thrombosis [Z86.718]  History of pulmonary embolism [Z86.711]  Long-term (current) use of anticoagulants [Z79.01] (Resolved) [Z79.01]             Anticoagulation Episode Summary     INR check location:       Preferred lab:       Send INR reminders to:   ROME FIGUEREDO    Comments:   [facility] W; phone        Anticoagulation Care  Providers     Provider Role Specialty Phone number    Ayah Rojas CNP Referring [facility] [phone]            See the Encounter Report to view Anticoagulation Flowsheet and Dosing Calendar (Go to Encounters tab in chart review, and find the Anticoagulation Therapy Visit)        Stefany Huynh RN

## 2020-09-24 NOTE — MR AVS SNAPSHOT
Frances Corea   6/1/2018 2:15 PM   Anticoagulation Therapy Visit    Description:  57 year old female   Provider:  [facility]   Department:  [facility]           INR as of 6/1/2018     Today's INR 1.8!      Anticoagulation Summary as of 6/1/2018     INR goal 2.0-3.0   Today's INR 1.8!   Full warfarin instructions 6 mg on Mon, Wed, Fri; 9 mg all other days   Next INR check 6/11/2018    Indications   History of deep venous thrombosis [Z86.718]  History of pulmonary embolism [Z86.711]  Long-term (current) use of anticoagulants [Z79.01] (Resolved) [Z79.01]         June 2018 Details    Sun Mon Tue Wed Thu Fri Sat          1      6 mg   See details      2      9 mg           3      9 mg         4      6 mg         5      9 mg         6      6 mg         7      9 mg         8      6 mg         9      9 mg           10      9 mg         11            12               13               14               15               16                 17               18               19               20               21               22               23                 24               25               26               27               28               29               30                Date Details   06/01 This INR check       Date of next INR:  6/11/2018         How to take your warfarin dose     To take:  6 mg Take 2 of the 3 mg tablets.    To take:  9 mg Take 3 of the 3 mg tablets.            Alternatives Discussed Intro (Do Not Add Period): I discussed alternative treatments to Mohs surgery and specifically discussed the risks and benefits of

## 2020-09-28 ENCOUNTER — ANTICOAGULATION THERAPY VISIT (OUTPATIENT)
Dept: ANTICOAGULATION | Facility: OTHER | Age: 60
End: 2020-09-28
Attending: NURSE PRACTITIONER
Payer: MEDICARE

## 2020-09-28 DIAGNOSIS — Z86.718 HISTORY OF DEEP VENOUS THROMBOSIS: ICD-10-CM

## 2020-09-28 DIAGNOSIS — Z79.01 LONG TERM CURRENT USE OF ANTICOAGULANT THERAPY: ICD-10-CM

## 2020-09-28 DIAGNOSIS — F25.1 SCHIZOAFFECTIVE DISORDER, DEPRESSIVE TYPE (H): Primary | ICD-10-CM

## 2020-09-28 DIAGNOSIS — I26.99 PULMONARY EMBOLISM AND INFARCTION (H): ICD-10-CM

## 2020-09-28 DIAGNOSIS — Z86.711 HISTORY OF PULMONARY EMBOLISM: ICD-10-CM

## 2020-09-28 LAB
INR BLD: 2 (ref 0.86–1.14)
LITHIUM SERPL-SCNC: 0.79 MMOL/L (ref 0.6–1.2)

## 2020-09-28 PROCEDURE — 80178 ASSAY OF LITHIUM: CPT | Mod: ZL | Performed by: NURSE PRACTITIONER

## 2020-09-28 PROCEDURE — 85610 PROTHROMBIN TIME: CPT | Mod: QW,ZL | Performed by: NURSE PRACTITIONER

## 2020-09-28 PROCEDURE — 36416 COLLJ CAPILLARY BLOOD SPEC: CPT | Mod: ZL | Performed by: NURSE PRACTITIONER

## 2020-09-28 NOTE — PROGRESS NOTES
"ANTICOAGULATION FOLLOW-UP CLINIC VISIT    Patient Name:  Frances Corea  Date:  2020  Contact Type:  Telephone/ spoke to patient mother, (caregiver), Frances    SUBJECTIVE:  Patient Findings     Comments:   INR done by lab. Call placed to patient mother's home phone and spoke to her re: INR result, warfarin dosing/INR recheck date. Her mom states that patient has no bleeding/bruising and no new changes in diet/meds/activity. She is still consuming quite a bit of vit K so asked her mom to have her eat a little less \"green stuff\" and she states she will do that. Her mom will call warfarin clinic if patient has any changes/issues/illness        Clinical Outcomes     Negatives:   Major bleeding event, Thromboembolic event, Anticoagulation-related hospital admission, Anticoagulation-related ED visit, Anticoagulation-related fatality    Comments:   INR done by lab. Call placed to patient mother's home phone and spoke to her re: INR result, warfarin dosing/INR recheck date. Her mom states that patient has no bleeding/bruising and no new changes in diet/meds/activity. She is still consuming quite a bit of vit K so asked her mom to have her eat a little less \"green stuff\" and she states she will do that. Her mom will call warfarin clinic if patient has any changes/issues/illness           OBJECTIVE    Recent labs: (last 7 days)     20  1319   INR 2.0*       ASSESSMENT / PLAN  INR assessment THER    Recheck INR In: 3 WEEKS    INR Location Clinic      Anticoagulation Summary  As of 2020    INR goal:   2.0-3.0   TTR:   47.3 % (1 y)   INR used for dosin.0 (2020)   Warfarin maintenance plan:   5 mg (5 mg x 1) every day   Full warfarin instructions:   5 mg every day   Weekly warfarin total:   35 mg   Plan last modified:   Stefany Huynh RN (2020)   Next INR check:   10/19/2020   Priority:   High   Target end date:   Indefinite    Indications    History of deep venous thrombosis [Z86.718]  History " of pulmonary embolism [Z86.711]  Long-term (current) use of anticoagulants [Z79.01] (Resolved) [Z79.01]             Anticoagulation Episode Summary     INR check location:       Preferred lab:       Send INR reminders to:   ROME FIGUEREDO    Comments:   [facility] W; phone        Anticoagulation Care Providers     Provider Role Specialty Phone number    Crystal Ayah, CNP Referring [facility] [phone]            See the Encounter Report to view Anticoagulation Flowsheet and Dosing Calendar (Go to Encounters tab in chart review, and find the Anticoagulation Therapy Visit)        Stefany Huynh RN

## 2020-10-20 ENCOUNTER — ANTICOAGULATION THERAPY VISIT (OUTPATIENT)
Dept: ANTICOAGULATION | Facility: OTHER | Age: 60
End: 2020-10-20
Attending: NURSE PRACTITIONER
Payer: MEDICARE

## 2020-10-20 DIAGNOSIS — Z79.01 LONG TERM CURRENT USE OF ANTICOAGULANT THERAPY: ICD-10-CM

## 2020-10-20 DIAGNOSIS — I26.99 PULMONARY EMBOLISM AND INFARCTION (H): ICD-10-CM

## 2020-10-20 DIAGNOSIS — Z86.711 HISTORY OF PULMONARY EMBOLISM: ICD-10-CM

## 2020-10-20 DIAGNOSIS — Z86.718 HISTORY OF DEEP VENOUS THROMBOSIS: ICD-10-CM

## 2020-10-20 LAB
CAPILLARY BLOOD COLLECTION: NORMAL
INR BLD: 3.5 (ref 0.86–1.14)

## 2020-10-20 PROCEDURE — 85610 PROTHROMBIN TIME: CPT | Mod: ZL | Performed by: NURSE PRACTITIONER

## 2020-10-20 PROCEDURE — 36416 COLLJ CAPILLARY BLOOD SPEC: CPT | Mod: ZL | Performed by: NURSE PRACTITIONER

## 2020-10-20 NOTE — PROGRESS NOTES
ANTICOAGULATION FOLLOW-UP CLINIC VISIT    Patient Name:  Frances Corea  Date:  10/20/2020  Contact Type:  Telephone/ spoke to patient mother (guardian), Frances    SUBJECTIVE:  Patient Findings     Comments:  INR done by lab. Call placed to patient mother and spoke to her re: INR result, warfarin dosing and INR recheck date. She verbalized understanidng and has no questions. She states patient has no bleeding/bruising and no new changes in diet/meds/activity. She will call warfarin clinic if any changes/issues/illness        Clinical Outcomes     Negatives:  Major bleeding event, Thromboembolic event, Anticoagulation-related hospital admission, Anticoagulation-related ED visit, Anticoagulation-related fatality    Comments:  INR done by lab. Call placed to patient mother and spoke to her re: INR result, warfarin dosing and INR recheck date. She verbalized understanidng and has no questions. She states patient has no bleeding/bruising and no new changes in diet/meds/activity. She will call warfarin clinic if any changes/issues/illness           OBJECTIVE    Recent labs: (last 7 days)     10/20/20  1329   INR 3.5*       ASSESSMENT / PLAN  INR assessment SUPRA    Recheck INR In: 3 WEEKS    INR Location Clinic      Anticoagulation Summary  As of 10/20/2020    INR goal:  2.0-3.0   TTR:  49.7 % (1 y)   INR used for dosing:  3.5 (10/20/2020)   Warfarin maintenance plan:  2.5 mg (5 mg x 0.5) every Fri; 5 mg (5 mg x 1) all other days   Full warfarin instructions:  10/20: 2.5 mg; Otherwise 2.5 mg every Fri; 5 mg all other days   Weekly warfarin total:  32.5 mg   Plan last modified:  Stefany Huynh RN (10/20/2020)   Next INR check:  11/12/2020   Priority:  High   Target end date:  Indefinite    Indications    History of deep venous thrombosis [Z86.718]  History of pulmonary embolism [Z86.711]  Long-term (current) use of anticoagulants [Z79.01] (Resolved) [Z79.01]             Anticoagulation Episode Summary     INR check  location:      Preferred lab:      Send INR reminders to:  ROME FIGUEREDO    Comments:  [facility] W; phone        Anticoagulation Care Providers     Provider Role Specialty Phone number    Ayah Rojas CNP Referring Family Medicine [phone]            See the Encounter Report to view Anticoagulation Flowsheet and Dosing Calendar (Go to Encounters tab in chart review, and find the Anticoagulation Therapy Visit)        Stefany Huynh RN

## 2020-11-23 ENCOUNTER — ANTICOAGULATION THERAPY VISIT (OUTPATIENT)
Dept: ANTICOAGULATION | Facility: OTHER | Age: 60
End: 2020-11-23

## 2020-11-23 DIAGNOSIS — Z86.711 HISTORY OF PULMONARY EMBOLISM: ICD-10-CM

## 2020-11-23 DIAGNOSIS — Z86.718 HISTORY OF DEEP VENOUS THROMBOSIS: ICD-10-CM

## 2020-11-23 NOTE — PROGRESS NOTES
ANTICOAGULATION FOLLOW-UP CLINIC VISIT    Patient Name:  Frances Corea  Date:  11/23/2020  Contact Type:  Telephone    SUBJECTIVE:  Patient Findings     Positives:  Hospital admission    Comments:  Informed by pt mother that pt is currently inpatient in a psych unit in [place]. Unknown when she will be discharged from there and able to have her INR drawn.         Clinical Outcomes     Negatives:  Major bleeding event, Thromboembolic event, Anticoagulation-related hospital admission, Anticoagulation-related ED visit, Anticoagulation-related fatality    Comments:  Informed by pt mother that pt is currently inpatient in a psych unit in [place]. Unknown when she will be discharged from there and able to have her INR drawn.            OBJECTIVE    No results for input(s): INR, PA24384, [id number], 2AGN, F2 in the last 168 hours.    ASSESSMENT / PLAN  No question data found.  Anticoagulation Summary  As of 11/23/2020    INR goal:  2.0-3.0   TTR:  44.5 % (11 mo)   INR used for dosing:  No new INR was available at the time of this encounter.   Warfarin maintenance plan:  2.5 mg (5 mg x 0.5) every Fri; 5 mg (5 mg x 1) all other days   Full warfarin instructions:  2.5 mg every Fri; 5 mg all other days   Weekly warfarin total:  32.5 mg   No change documented:  Alyse Love RN   Plan last modified:  Stefany Huynh RN (10/20/2020)   Next INR check:  12/30/2020   Priority:  High   Target end date:  Indefinite    Indications    History of deep venous thrombosis [Z86.718]  History of pulmonary embolism [Z86.711]  Long-term (current) use of anticoagulants [Z79.01] (Resolved) [Z79.01]             Anticoagulation Episode Summary     INR check location:      Preferred lab:      Send INR reminders to:  ROME FIGUEREDO    Comments:  [facility] W; phone        Anticoagulation Care Providers     Provider Role Specialty Phone number    Ayah Rojas CNP Referring Family Medicine [phone]             See the Encounter Report to view Anticoagulation Flowsheet and Dosing Calendar (Go to Encounters tab in chart review, and find the Anticoagulation Therapy Visit)        Alyse Love RN

## 2021-02-09 ENCOUNTER — ANTICOAGULATION THERAPY VISIT (OUTPATIENT)
Dept: ANTICOAGULATION | Facility: OTHER | Age: 61
End: 2021-02-09

## 2021-02-09 DIAGNOSIS — Z86.711 HISTORY OF PULMONARY EMBOLISM: ICD-10-CM

## 2021-02-09 DIAGNOSIS — Z86.718 HISTORY OF DEEP VENOUS THROMBOSIS: ICD-10-CM

## 2021-02-09 NOTE — PROGRESS NOTES
ANTICOAGULATION FOLLOW-UP CLINIC VISIT    Patient Name:  Frances Corea  Date:  2/9/2021  Contact Type:  Telephone/ Spoke with patient's mother, Frances    SUBJECTIVE:  Patient Findings     Comments:  Call placed to patient's mother as the last time we had spoken patient was in an inpatient psychiatric facility. Call placed to follow through to see where patient was currently at. Frances stated that she is still inpatient in [place] and is not completely sure of what the plans will be. They are looking to place her in an assistive living facility, but the location and plans are unknown yet. Requested that Frances contact us if she hears anything regarding future plans for Frances.         Clinical Outcomes     Negatives:  Major bleeding event, Thromboembolic event, Anticoagulation-related hospital admission, Anticoagulation-related ED visit, Anticoagulation-related fatality    Comments:  Call placed to patient's mother as the last time we had spoken patient was in an inpatient psychiatric facility. Call placed to follow through to see where patient was currently at. Frances stated that she is still inpatient in [place] and is not completely sure of what the plans will be. They are looking to place her in an assistive living facility, but the location and plans are unknown yet. Requested that Frances contact us if she hears anything regarding future plans for Frances.            OBJECTIVE    No results for input(s): INR, [id number], [id number], 2AGN, F2 in the last 168 hours.    ASSESSMENT / PLAN  No question data found.  Anticoagulation Summary  As of 2/9/2021    INR goal:  2.0-3.0   TTR:  39.7 % (8.4 mo)   INR used for dosing:  No new INR was available at the time of this encounter.   Warfarin maintenance plan:  2.5 mg (5 mg x 0.5) every Fri; 5 mg (5 mg x 1) all other days   Full warfarin instructions:  2.5 mg every Fri; 5 mg all other days   Weekly warfarin total:  32.5 mg   No change documented:  Alyse Love RN    Plan last modified:  Stefany Huynh RN (10/20/2020)   Next INR check:  4/1/2021   Priority:  High   Target end date:  Indefinite    Indications    History of deep venous thrombosis [Z86.718]  History of pulmonary embolism [Z86.711]  Long-term (current) use of anticoagulants [Z79.01] (Resolved) [Z79.01]             Anticoagulation Episode Summary     INR check location:      Preferred lab:      Send INR reminders to:  ROME FIGUEREDO    Comments:  [facility] W; phone        Anticoagulation Care Providers     Provider Role Specialty Phone number    Gregoriomaurice Ayah, CNP Referring Family Medicine [phone]            See the Encounter Report to view Anticoagulation Flowsheet and Dosing Calendar (Go to Encounters tab in chart review, and find the Anticoagulation Therapy Visit)        Alyse Love RN

## 2021-04-05 ENCOUNTER — ANTICOAGULATION THERAPY VISIT (OUTPATIENT)
Dept: ANTICOAGULATION | Facility: OTHER | Age: 61
End: 2021-04-05

## 2021-04-05 DIAGNOSIS — Z86.718 HISTORY OF DEEP VENOUS THROMBOSIS: ICD-10-CM

## 2021-04-05 DIAGNOSIS — Z86.711 HISTORY OF PULMONARY EMBOLISM: ICD-10-CM

## 2021-04-05 NOTE — PROGRESS NOTES
ANTICOAGULATION FOLLOW-UP CLINIC VISIT    Patient Name:  Frances Corea  Date:  4/5/2021  Contact Type:  Telephone/ Left voicemail for patient's mother, Frances    SUBJECTIVE:  Patient Findings     Comments:  [facility] called patient's mother, Frances, and left voicemail requesting an update on pt. The last we spoke she was still inpatient in [place] and the plan was to find housing for her in that area. Will keep patient on Warfarin Clinic's caseload, but requested a call back from patient's mother regarding her status.         Clinical Outcomes     Negatives:  Major bleeding event, Thromboembolic event, Anticoagulation-related hospital admission, Anticoagulation-related ED visit, Anticoagulation-related fatality    Comments:  [facility]in Clinic called patient's mother, Frances, and left voicemail requesting an update on pt. The last we spoke she was still inpatient in [place] and the plan was to find housing for her in that area. Will keep patient on Warfarin Clinic's caseload, but requested a call back from patient's mother regarding her status.            OBJECTIVE    No results for input(s): INR, [id number], [id number], 2AGN, F2 in the last 168 hours.    ASSESSMENT / PLAN  No question data found.  Anticoagulation Summary  As of 4/5/2021    INR goal:  2.0-3.0   TTR:  28.5 % (6.6 mo)   INR used for dosing:  No new INR was available at the time of this encounter.   Warfarin maintenance plan:  2.5 mg (5 mg x 0.5) every Fri; 5 mg (5 mg x 1) all other days   Full warfarin instructions:  2.5 mg every Fri; 5 mg all other days   Weekly warfarin total:  32.5 mg   No change documented:  Alyse Love RN   Plan last modified:  Stefany Huynh RN (10/20/2020)   Next INR check:  6/7/2021   Priority:  High   Target end date:  Indefinite    Indications    History of deep venous thrombosis [Z86.718]  History of pulmonary embolism [Z86.711]  Long-term (current) use of anticoagulants [Z79.01] (Resolved) [Z79.01]              Anticoagulation Episode Summary     INR check location:      Preferred lab:      Send INR reminders to:  ROME FIGUEREDO    Comments:  [facility]; phone        Anticoagulation Care Providers     Provider Role Specialty Phone number    Ayah Rojas CNP Referring [facility] Medicine [phone]            See the Encounter Report to view Anticoagulation Flowsheet and Dosing Calendar (Go to Encounters tab in chart review, and find the Anticoagulation Therapy Visit)        Alyse Love RN

## 2021-06-08 ENCOUNTER — ANTICOAGULATION THERAPY VISIT (OUTPATIENT)
Dept: ANTICOAGULATION | Facility: OTHER | Age: 61
End: 2021-06-08

## 2021-06-08 DIAGNOSIS — Z86.718 HISTORY OF DEEP VENOUS THROMBOSIS: ICD-10-CM

## 2021-06-08 DIAGNOSIS — Z86.711 HISTORY OF PULMONARY EMBOLISM: ICD-10-CM

## 2021-06-08 NOTE — Clinical Note
Just letting you know she will be discharged from our Coumadin Clinic. Will gladly manage again if she returns to the area!

## 2021-06-08 NOTE — PROGRESS NOTES
ANTICOAGULATION FOLLOW-UP CLINIC VISIT    Patient Name:  Frances Corea  Date:  6/8/2021  Contact Type:      SUBJECTIVE:  Patient Findings     Comments:  Patient still residing in [facility] with no known date of return. Will notify PCP that patient is no longer being managed by [facility] and will discharge her from our program.         Clinical Outcomes     Negatives:  Major bleeding event, Thromboembolic event, Anticoagulation-related hospital admission, Anticoagulation-related ED visit, Anticoagulation-related fatality    Comments:  Patient still residing in [facility] with no known date of return. Will notify PCP that patient is no longer being managed by [facility] and will discharge her from our program.            OBJECTIVE    No results for input(s): INR, [id number], [id number], 2AGN, F2 in the last 168 hours.    ASSESSMENT / PLAN  No question data found.  Anticoagulation Summary  As of 6/8/2021    INR goal:  2.0-3.0   TTR:  26.8 % (4.5 mo)   INR used for dosing:  No new INR was available at the time of this encounter.   Warfarin maintenance plan:  2.5 mg (5 mg x 0.5) every Fri; 5 mg (5 mg x 1) all other days   Full warfarin instructions:  2.5 mg every Fri; 5 mg all other days   Weekly warfarin total:  32.5 mg   No change documented:  Alyse Love RN   Plan last modified:  Stefany Huynh RN (10/20/2020)   Next INR check:     Target end date:  Indefinite    Indications    History of deep venous thrombosis [Z86.718]  History of pulmonary embolism [Z86.711]  Long-term (current) use of anticoagulants [Z79.01] (Resolved) [Z79.01]             Anticoagulation Episode Summary     INR check location:      Preferred lab:      Resolved date:  6/8/2021    Resolved reason:  Patient moved    Send INR reminders to:  ROME FIGUEREDO    Comments:  [facility]; phone        Anticoagulation Care Providers     Provider Role Specialty Phone number     Ayah Rojas, CNP Referring Family Medicine [phone]            See the Encounter Report to view Anticoagulation Flowsheet and Dosing Calendar (Go to Encounters tab in chart review, and find the Anticoagulation Therapy Visit)        Alyse Love RN

## 2022-02-17 PROBLEM — J98.9 REACTIVE AIRWAY DISEASE WITHOUT ASTHMA: Status: ACTIVE | Noted: 2018-04-17

## 2023-08-16 NOTE — PATIENT INSTRUCTIONS
ASSESSMENT/PLAN:   1. Routine general medical examination at a health care facility  - Annual physical 1 year    2. Acquired hypothyroidism  - Continue plan of care    3. Hyperlipidemia with target LDL less than 100  - Continue plan of care    4. Encounter for monitoring statin therapy  - Lab UTD    5. Gastroesophageal reflux disease without esophagitis  - Continue plan of care    6. History of deep venous thrombosis  - Continue plan of care    7. History of pulmonary embolism  - Continue plan of care    8. Benign essential hypertension  - Continue plan of care    9. Anxiety  - Continue plan of care    10. Primary insomnia  - Continue plan of care    11. RADHA on CPAP  - Face to face visit today    12. Screening for breast cancer  - MA SCREENING DIGITAL BILATERAL ([place]); Future    13. Atopic dermatitis, unspecified type  - mometasone (ELOCON) 0.1 % external cream; APPLY SPARINGLY EXTERNALLY TO THE AFFECTED AREA TWICE DAILY AS NEEDED. DO NOT APPLY TO FACE  Dispense: 45 g; Refill: 5    14. Reactive airway disease that is not asthma  - albuterol (PROVENTIL) (2.5 MG/3ML) 0.083% neb solution; Take 1 vial (2.5 mg) by nebulization every 6 hours as needed for shortness of breath / dyspnea or wheezing Twice daily  Dispense: 1 Box; Refill: 3    15. Left knee pain, unspecified chronicity  - traMADol (ULTRAM) 50 MG tablet; TAKE 1 TO 2 TABLETS BY MOUTH EVERY 6 HOURS AS NEEDED FOR PAIN. MAX OF 6 TABLETS DAILY.  Dispense: 60 tablet; Refill: 0    16. Encounter for Medicare annual wellness exam  - MA SCREENING DIGITAL BILATERAL ([place]); Future    17. Fibrocystic breast changes, unspecified laterality  - Mammogram ordered    18. Encounter for screening mammogram for malignant neoplasm of breast   - MA SCREENING DIGITAL BILATERAL ([place]); Future            Preventive Health Recommendations  Female Ages 50 - 64    Yearly exam: See your health care provider every year in order to  o Review health changes.   o Discuss preventive  care.    o Review your medicines if your doctor has prescribed any.      Get a Pap test every three years (unless you have an abnormal result and your provider advises testing more often).    If you get Pap tests with HPV test, you only need to test every 5 years, unless you have an abnormal result.     You do not need a Pap test if your uterus was removed (hysterectomy) and you have not had cancer.    You should be tested each year for STDs (sexually transmitted diseases) if you're at risk.     Have a mammogram every 1 to 2 years.    Have a colonoscopy at age 50, or have a yearly FIT test (stool test). These exams screen for colon cancer.      Have a cholesterol test every 5 years, or more often if advised.    Have a diabetes test (fasting glucose) every three years. If you are at risk for diabetes, you should have this test more often.     If you are at risk for osteoporosis (brittle bone disease), think about having a bone density scan (DEXA).    Shots: Get a flu shot each year. Get a tetanus shot every 10 years.    Nutrition:     Eat at least 5 servings of fruits and vegetables each day.    Eat whole-grain bread, whole-wheat pasta and brown rice instead of white grains and rice.    Get adequate Calcium and Vitamin D.     Lifestyle    Exercise at least 150 minutes a week (30 minutes a day, 5 days a week). This will help you control your weight and prevent disease.    Limit alcohol to one drink per day.    No smoking.     Wear sunscreen to prevent skin cancer.     See your dentist every six months for an exam and cleaning.    See your eye doctor every 1 to 2 years.    Patient Education   Personalized Prevention Plan  You are due for the preventive services outlined below.  Your care team is available to assist you in scheduling these services.  If you have already completed any of these items, please share that information with your care team to update in your medical record.  Health Maintenance Due   Topic Date  Due     URINE DRUG SCREEN  1960     Annual Wellness Visit  07/31/1976     Zoster (Shingles) Vaccine (1 of 2) 07/31/2008     Mammogram  06/20/2019     Flu Vaccine (1) 09/01/2019     Depression Assessment  09/20/2019         Mohs Rapid Report Verbiage: The area of clinically evident tumor was marked with skin marking ink and appropriately hatched.  The initial incision was made following the Mohs approach through the skin.  The specimen was taken to the lab, divided into the necessary number of pieces, chromacoded and processed according to the Mohs protocol.  This was repeated in successive stages until a tumor free defect was achieved.

## 2024-08-05 NOTE — PROGRESS NOTES
ANTICOAGULATION FOLLOW-UP CLINIC VISIT    Patient Name:  Frances Corea  Date:  2019  Contact Type:  Telephone/ spoke to homecare nurse Kyra    SUBJECTIVE:     Patient Findings     Positives:   No Problem Findings    Comments:   [facility] Home care nurse called coumadin with INR of 2.8.  Went over INR result, warfarin dosing (per consult with Stefany Huynh RN), and next INR recheck date.  Nurse verbalizes understanding and has no questions.  Patient will be discharged from home care today. Patient will have next INR recheck done at [facility].  Nurse states no bleeding/bruising or changes to diet/medication/activity.             OBJECTIVE    INR   Date Value Ref Range Status   2019 2.8  Final       ASSESSMENT / PLAN  INR assessment THER    Recheck INR In: 2 WEEKS    INR Location Clinic      Anticoagulation Summary  As of 2019    INR goal:   2.0-3.0   TTR:   36.3 % (1.1 y)   INR used for dosin.8 (2019)   Warfarin maintenance plan:   10 mg (5 mg x 2) every Mon, Wed, Fri; 5 mg (5 mg x 1) all other days   Full warfarin instructions:   10 mg every Mon, Wed, Fri; 5 mg all other days   Weekly warfarin total:   50 mg   No change documented:   Ivanna Martinez RN   Plan last modified:   Stefany Huynh RN (2019)   Next INR check:   2019   Target end date:   Indefinite    Indications    History of deep venous thrombosis [Z86.718]  History of pulmonary embolism [Z86.711]  Long-term (current) use of anticoagulants [Z79.01] (Resolved) [Z79.01]             Anticoagulation Episode Summary     INR check location:       Preferred lab:       Send INR reminders to:    ANTICOAG POOL    Comments:   [facility]; phone  [phone]      Anticoagulation Care Providers     Provider Role Specialty Phone number    Ayah Rojas NP [facility] [phone]            See the Encounter Report to view Anticoagulation Flowsheet and Dosing Calendar (Go to Encounters tab in chart  review, and find the Anticoagulation Therapy Visit)      Ivanna Martinez RN                  None